# Patient Record
Sex: FEMALE | Race: OTHER | Employment: UNEMPLOYED | URBAN - METROPOLITAN AREA
[De-identification: names, ages, dates, MRNs, and addresses within clinical notes are randomized per-mention and may not be internally consistent; named-entity substitution may affect disease eponyms.]

---

## 2019-09-04 LAB — MAMMOGRAPHY, EXTERNAL: NORMAL

## 2020-08-04 ENCOUNTER — HOSPITAL ENCOUNTER (OUTPATIENT)
Age: 60
Discharge: HOME | End: 2020-08-06
Payer: COMMERCIAL

## 2020-08-04 ENCOUNTER — OFFICE VISIT (OUTPATIENT)
Dept: PRIMARY CARE CLINIC | Age: 60
End: 2020-08-04
Payer: COMMERCIAL

## 2020-08-04 VITALS
WEIGHT: 158 LBS | RESPIRATION RATE: 16 BRPM | SYSTOLIC BLOOD PRESSURE: 128 MMHG | DIASTOLIC BLOOD PRESSURE: 60 MMHG | HEIGHT: 66 IN | OXYGEN SATURATION: 98 % | HEART RATE: 77 BPM | BODY MASS INDEX: 25.39 KG/M2

## 2020-08-04 LAB
CHOLESTEROL, TOTAL: 153 MG/DL (ref 0–199)
HDLC SERPL-MCNC: 43 MG/DL
INTERNATIONAL NORMALIZATION RATIO, POC: 3.5
LDL CHOLESTEROL CALCULATED: 69 MG/DL (ref 0–99)
PROTHROMBIN TIME, POC: NORMAL
TRIGL SERPL-MCNC: 204 MG/DL (ref 0–149)
VLDLC SERPL CALC-MCNC: 41 MG/DL

## 2020-08-04 PROCEDURE — 99204 OFFICE O/P NEW MOD 45 MIN: CPT | Performed by: FAMILY MEDICINE

## 2020-08-04 PROCEDURE — 80061 LIPID PANEL: CPT

## 2020-08-04 PROCEDURE — 85610 PROTHROMBIN TIME: CPT | Performed by: FAMILY MEDICINE

## 2020-08-04 PROCEDURE — 3017F COLORECTAL CA SCREEN DOC REV: CPT | Performed by: FAMILY MEDICINE

## 2020-08-04 PROCEDURE — 4004F PT TOBACCO SCREEN RCVD TLK: CPT | Performed by: FAMILY MEDICINE

## 2020-08-04 PROCEDURE — G8427 DOCREV CUR MEDS BY ELIG CLIN: HCPCS | Performed by: FAMILY MEDICINE

## 2020-08-04 PROCEDURE — G8419 CALC BMI OUT NRM PARAM NOF/U: HCPCS | Performed by: FAMILY MEDICINE

## 2020-08-04 PROCEDURE — 36415 COLL VENOUS BLD VENIPUNCTURE: CPT

## 2020-08-04 RX ORDER — PAROXETINE HYDROCHLORIDE 20 MG/1
20 TABLET, FILM COATED ORAL EVERY MORNING
COMMUNITY
End: 2020-11-24 | Stop reason: SDUPTHER

## 2020-08-04 RX ORDER — WARFARIN SODIUM 5 MG/1
5 TABLET ORAL
COMMUNITY
End: 2020-11-24 | Stop reason: SDUPTHER

## 2020-08-04 RX ORDER — ATORVASTATIN CALCIUM 40 MG/1
40 TABLET, FILM COATED ORAL DAILY
COMMUNITY
End: 2020-11-24 | Stop reason: SDUPTHER

## 2020-08-04 ASSESSMENT — PATIENT HEALTH QUESTIONNAIRE - PHQ9
1. LITTLE INTEREST OR PLEASURE IN DOING THINGS: 0
SUM OF ALL RESPONSES TO PHQ9 QUESTIONS 1 & 2: 0
SUM OF ALL RESPONSES TO PHQ QUESTIONS 1-9: 0
SUM OF ALL RESPONSES TO PHQ QUESTIONS 1-9: 0
2. FEELING DOWN, DEPRESSED OR HOPELESS: 0

## 2020-08-04 NOTE — PROGRESS NOTES
2020     Olga Holter    : 1960 Sex: female   Age: 62 y.o. Chief Complaint   Patient presents with    [facility]       HPI: This 62y.o. -year-old female  presents today to establish as a new patient. Past medical and surgical history include aortic valve replacement surgery, depression, hyperlipidemia and hydrocephalus. Social history is positive for tobacco dependence. The patient states she smoked for 40 years approximately 5 cigarettes a day. Family history is significant for Parkinson's and pulmonary arterial hypertension. The patient is up-to-date on all age-appropriate wellness issues. ROS:   Const: Denies changes in appetite, chills, fever, night sweats and weight loss. Eyes:  Denies discharge, a recent change in visual acuity, blurred vision and double vision. ENMT: Denies discharge of the ears, hearing loss, pain of the ears. Denies nasal or sinus symptoms other than stated above. Denies mouth or throat symptoms. CV:  Denies chest pain, dyspnea on exertion, orthopnea, palpitations and PND  Resp: Denies chest pain, cough, SOB and wheezing. GI: Denies abdominal pain, constipation, diarrhea, heartburn, indigestion, nausea and vomiting. : Denies dysuria, frequency, hematuria, nocturia and urgency. Musculo: Denies arthralgias and myalgia  Skin:  Denies lesions, pruritus and rash. Neuro: Denies dizziness, lightheadedness, numbness, tingling and weakness. Psych:  Denies anxiety and depression  Endocrine: Denies anxiety and depression. Hema/Lymph: Denies hematologic symptoms  Allergy/Immuno:  Denies allergic/immunologic symptoms.   Pertinent positives reviewed and noted      Current Outpatient Medications:     warfarin (COUMADIN) 5 MG tablet, Take 5 mg by mouth, Disp: , Rfl:     atorvastatin (LIPITOR) 40 MG tablet, Take 40 mg by mouth daily, Disp: , Rfl:     PARoxetine (PAXIL) 20 MG tablet, Take 20 mg by mouth every morning, Disp: , Rfl:     Allergies   Allergen Reactions    Pcn [Penicillins]        No past medical history on file. Social History     Socioeconomic History    Marital status:      Spouse name: Not on file    Number of children: Not on file    Years of education: Not on file    Highest education level: Not on file   Occupational History    Not on file   Social Needs    Financial resource strain: Not on file    Food insecurity     Worry: Not on file     Inability: Not on file    Transportation needs     Medical: Not on file     Non-medical: Not on file   Tobacco Use    Smoking status: Current Every Day Smoker     Packs/day: 0.25     Years: 40.00     Pack years: 10.00     Start date: 8/4/1980    Smokeless tobacco: Never Used   Substance and Sexual Activity    Alcohol use: Not on file    Drug use: Not on file    Sexual activity: Not on file   Lifestyle    Physical activity     Days per week: Not on file     Minutes per session: Not on file    Stress: Not on file   Relationships    Social connections     Talks on phone: Not on file     Gets together: Not on file     Attends Nondenominational service: Not on file     Active member of club or organization: Not on file     Attends meetings of clubs or organizations: Not on file     Relationship status: Not on file    Intimate partner violence     Fear of current or ex partner: Not on file     Emotionally abused: Not on file     Physically abused: Not on file     Forced sexual activity: Not on file   Other Topics Concern    Not on file   Social History Narrative    Not on file     No past surgical history on file. No family history on file. Vitals:    08/04/20 1248   BP: 128/60   Pulse: 77   Resp: 16   SpO2: 98%   Weight: 158 lb (71.7 kg)   Height: 5' 6\" (1.676 m)        Exam: Const: Appears healthy and well developed. No signs of acute distress present. Eyes: PERRL  ENMT: Tympanic membranes are intact. Nasal mucosa intact without noted erythema Septum is in the midline.   Posterior pharynx shows no exudate, irritation or redness. Neck:  Supple without adenopathy. Adequate range of motion   Resp: No rales, rhonchi, wheezes appreciated over the lungs bilaterally. CV: S1, S2 within normal limits. Regular rate and rhythm noted. Without murmur, gallop or rub. Extremities:  Pulses intact. Without noted edema. Abdomen: Positive bowel sounds. Palpation reveals softness, with no distension, organomegaly or tenderness. No abdominal masses palpable. Skin: Skin is warm and dry. Musculo: Unremarkable upon examination. Neuro: Alert and oriented X3. Cranial nerves grossly intact. Psych: Mood is normal.  Affect is normal.   Vital signs reviewed. Controlled Substances Monitoring:     No flowsheet data found. Plan Per Assessment:  Valerie Carlisle was seen today for establish care. Diagnoses and all orders for this visit:    History of artificial heart valve    Hyperlipidemia, unspecified hyperlipidemia type  -     Lipid Panel; Future    Encounter for screening mammogram for malignant neoplasm of breast  -     EDEL SCREENING W CAD BILATERAL 2 VW; Future    Encounter for screening for malignant neoplasm of rectum  -     POCT Fecal Immunochemical Test (FIT); Future    Depression, unspecified depression type        Return in about 4 weeks (around 9/1/2020) for PT/INR. 3-month office visit follow-up. Octaviano Harrington MD    Note was generated with the assistance of voice recognition software. Document was reviewed however may contain grammatical errors.

## 2020-08-11 ENCOUNTER — NURSE ONLY (OUTPATIENT)
Dept: PRIMARY CARE CLINIC | Age: 60
End: 2020-08-11
Payer: COMMERCIAL

## 2020-08-11 LAB
INTERNATIONAL NORMALIZATION RATIO, POC: 3
PROTHROMBIN TIME, POC: 36

## 2020-08-11 PROCEDURE — 85610 PROTHROMBIN TIME: CPT | Performed by: FAMILY MEDICINE

## 2020-08-27 ENCOUNTER — TELEPHONE (OUTPATIENT)
Dept: FAMILY MEDICINE CLINIC | Age: 60
End: 2020-08-27

## 2020-08-27 NOTE — TELEPHONE ENCOUNTER
Patient is calling requesting incontinence supplies. She said if someone calls her back to leave a message as her phone does not receive calls from numbers she doesn't have saved.

## 2020-09-02 ENCOUNTER — TELEPHONE (OUTPATIENT)
Dept: PRIMARY CARE CLINIC | Age: 60
End: 2020-09-02

## 2020-09-03 ENCOUNTER — TELEPHONE (OUTPATIENT)
Dept: PRIMARY CARE CLINIC | Age: 60
End: 2020-09-03

## 2020-09-04 ENCOUNTER — NURSE ONLY (OUTPATIENT)
Dept: PRIMARY CARE CLINIC | Age: 60
End: 2020-09-04
Payer: COMMERCIAL

## 2020-09-04 ENCOUNTER — ANTI-COAG VISIT (OUTPATIENT)
Dept: PRIMARY CARE CLINIC | Age: 60
End: 2020-09-04

## 2020-09-04 LAB
INTERNATIONAL NORMALIZATION RATIO, POC: 4.4
PROTHROMBIN TIME, POC: NORMAL

## 2020-09-04 PROCEDURE — 85610 PROTHROMBIN TIME: CPT | Performed by: FAMILY MEDICINE

## 2020-09-08 ENCOUNTER — TELEPHONE (OUTPATIENT)
Dept: PRIMARY CARE CLINIC | Age: 60
End: 2020-09-08

## 2020-09-11 ENCOUNTER — ANTI-COAG VISIT (OUTPATIENT)
Dept: PRIMARY CARE CLINIC | Age: 60
End: 2020-09-11

## 2020-09-11 ENCOUNTER — NURSE ONLY (OUTPATIENT)
Dept: PRIMARY CARE CLINIC | Age: 60
End: 2020-09-11
Payer: COMMERCIAL

## 2020-09-11 LAB
INTERNATIONAL NORMALIZATION RATIO, POC: 1.8
PROTHROMBIN TIME, POC: NORMAL

## 2020-09-11 PROCEDURE — 85610 PROTHROMBIN TIME: CPT | Performed by: FAMILY MEDICINE

## 2020-09-21 ENCOUNTER — NURSE ONLY (OUTPATIENT)
Dept: PRIMARY CARE CLINIC | Age: 60
End: 2020-09-21
Payer: COMMERCIAL

## 2020-09-21 LAB — INTERNATIONAL NORMALIZATION RATIO, POC: 3.5

## 2020-09-21 PROCEDURE — 85610 PROTHROMBIN TIME: CPT | Performed by: FAMILY MEDICINE

## 2020-09-21 PROCEDURE — 93793 ANTICOAG MGMT PT WARFARIN: CPT | Performed by: FAMILY MEDICINE

## 2020-10-12 ENCOUNTER — NURSE ONLY (OUTPATIENT)
Dept: PRIMARY CARE CLINIC | Age: 60
End: 2020-10-12
Payer: COMMERCIAL

## 2020-10-12 LAB
INTERNATIONAL NORMALIZATION RATIO, POC: 3.3
PROTHROMBIN TIME, POC: 39.1

## 2020-10-12 PROCEDURE — 85610 PROTHROMBIN TIME: CPT | Performed by: FAMILY MEDICINE

## 2020-11-02 ENCOUNTER — TELEPHONE (OUTPATIENT)
Dept: ADMINISTRATIVE | Age: 60
End: 2020-11-02

## 2020-11-17 ENCOUNTER — NURSE ONLY (OUTPATIENT)
Dept: PRIMARY CARE CLINIC | Age: 60
End: 2020-11-17
Payer: COMMERCIAL

## 2020-11-17 LAB
INTERNATIONAL NORMALIZATION RATIO, POC: 4.5
PROTHROMBIN TIME, POC: 54.4

## 2020-11-17 PROCEDURE — 85610 PROTHROMBIN TIME: CPT | Performed by: FAMILY MEDICINE

## 2020-11-17 PROCEDURE — 93793 ANTICOAG MGMT PT WARFARIN: CPT | Performed by: FAMILY MEDICINE

## 2020-11-17 RX ORDER — WARFARIN SODIUM 4 MG/1
4 TABLET ORAL DAILY
Qty: 30 TABLET | Refills: 3 | Status: SHIPPED
Start: 2020-11-17 | End: 2020-11-24 | Stop reason: SDUPTHER

## 2020-11-20 LAB
CONTROL: NORMAL
HEMOCCULT STL QL: NORMAL

## 2020-11-20 PROCEDURE — 82274 ASSAY TEST FOR BLOOD FECAL: CPT | Performed by: FAMILY MEDICINE

## 2020-11-24 ENCOUNTER — OFFICE VISIT (OUTPATIENT)
Dept: PRIMARY CARE CLINIC | Age: 60
End: 2020-11-24
Payer: COMMERCIAL

## 2020-11-24 ENCOUNTER — ANTI-COAG VISIT (OUTPATIENT)
Dept: PRIMARY CARE CLINIC | Age: 60
End: 2020-11-24

## 2020-11-24 VITALS
RESPIRATION RATE: 16 BRPM | HEIGHT: 66 IN | WEIGHT: 159 LBS | SYSTOLIC BLOOD PRESSURE: 138 MMHG | DIASTOLIC BLOOD PRESSURE: 70 MMHG | BODY MASS INDEX: 25.55 KG/M2

## 2020-11-24 PROBLEM — E78.5 HYPERLIPIDEMIA: Status: ACTIVE | Noted: 2020-11-24

## 2020-11-24 PROBLEM — Z95.2 HISTORY OF ARTIFICIAL HEART VALVE: Status: ACTIVE | Noted: 2020-11-24

## 2020-11-24 PROBLEM — F32.A DEPRESSION: Status: ACTIVE | Noted: 2020-11-24

## 2020-11-24 LAB
INTERNATIONAL NORMALIZATION RATIO, POC: 2.6
PROTHROMBIN TIME, POC: NORMAL

## 2020-11-24 PROCEDURE — 99214 OFFICE O/P EST MOD 30 MIN: CPT | Performed by: FAMILY MEDICINE

## 2020-11-24 PROCEDURE — G8482 FLU IMMUNIZE ORDER/ADMIN: HCPCS | Performed by: FAMILY MEDICINE

## 2020-11-24 PROCEDURE — 85610 PROTHROMBIN TIME: CPT | Performed by: FAMILY MEDICINE

## 2020-11-24 PROCEDURE — G8419 CALC BMI OUT NRM PARAM NOF/U: HCPCS | Performed by: FAMILY MEDICINE

## 2020-11-24 PROCEDURE — 90686 IIV4 VACC NO PRSV 0.5 ML IM: CPT | Performed by: FAMILY MEDICINE

## 2020-11-24 PROCEDURE — G8427 DOCREV CUR MEDS BY ELIG CLIN: HCPCS | Performed by: FAMILY MEDICINE

## 2020-11-24 PROCEDURE — 4004F PT TOBACCO SCREEN RCVD TLK: CPT | Performed by: FAMILY MEDICINE

## 2020-11-24 PROCEDURE — 90471 IMMUNIZATION ADMIN: CPT | Performed by: FAMILY MEDICINE

## 2020-11-24 PROCEDURE — 3017F COLORECTAL CA SCREEN DOC REV: CPT | Performed by: FAMILY MEDICINE

## 2020-11-24 RX ORDER — PAROXETINE HYDROCHLORIDE 20 MG/1
20 TABLET, FILM COATED ORAL EVERY MORNING
Qty: 30 TABLET | Refills: 3 | Status: SHIPPED
Start: 2020-11-24 | End: 2020-12-14 | Stop reason: SDUPTHER

## 2020-11-24 RX ORDER — ATORVASTATIN CALCIUM 40 MG/1
40 TABLET, FILM COATED ORAL DAILY
Qty: 30 TABLET | Refills: 3 | Status: SHIPPED
Start: 2020-11-24 | End: 2020-12-14 | Stop reason: SDUPTHER

## 2020-11-24 RX ORDER — UNDERPADS 23" X 36"
1 EACH MISCELLANEOUS DAILY
Qty: 2 PACKAGE | Refills: 3 | Status: SHIPPED | OUTPATIENT
Start: 2020-11-24 | End: 2021-07-26

## 2020-11-24 RX ORDER — WARFARIN SODIUM 5 MG/1
5 TABLET ORAL DAILY
Qty: 30 TABLET | Refills: 3 | Status: SHIPPED
Start: 2020-11-24 | End: 2020-12-14 | Stop reason: SDUPTHER

## 2020-11-24 RX ORDER — WARFARIN SODIUM 4 MG/1
4 TABLET ORAL DAILY
Qty: 30 TABLET | Refills: 3 | Status: SHIPPED
Start: 2020-11-24 | End: 2021-05-12 | Stop reason: SDUPTHER

## 2020-11-24 NOTE — PROGRESS NOTES
2020     Olga Holter    : 1960 Sex: female   Age: 56 y.o. Chief Complaint   Patient presents with    Other    Hyperlipidemia    Depression       HPI: This 56y.o. -year-old female  presents today for evaluation and management of her  chronic medical problems. Current medication list reviewed. The patient is tolerating all medications well without adverse events or known side effects. The patient does understand the risk and benefits of the prescribed medications. The patient is not up-to-date on all age-appropriate wellness issues. The patient presents today with a 5-month history of left lower quadrant pain. The patient states the pain is constant. The patient denies any recent change in bowel habits. The patient denies any urinary symptoms. Patient denies any fever, chills, night sweats, weight loss or decreased appetite. ROS:   Const: Denies changes in appetite, chills, fever, night sweats and weight loss. Eyes:  Denies discharge, a recent change in visual acuity, blurred vision and double vision. ENMT: Denies discharge of the ears, hearing loss, pain of the ears. Denies nasal or sinus symptoms other than stated above. Denies mouth or throat symptoms. CV:  Denies chest pain, dyspnea on exertion, orthopnea, palpitations and PND  Resp: Denies chest pain, cough, SOB and wheezing. GI: Pertinent for left lower quadrant pain. : Denies dysuria, frequency, hematuria, nocturia and urgency. Musculo: Denies arthralgias and myalgia  Skin:  Denies lesions, pruritus and rash. Neuro: Denies dizziness, lightheadedness, numbness, tingling and weakness. Psych:  Denies anxiety and depression  Endocrine: Denies anxiety and depression. Hema/Lymph: Denies hematologic symptoms  Allergy/Immuno:  Denies allergic/immunologic symptoms.   Pertinent positives reviewed and noted      Current Outpatient Medications:     warfarin (COUMADIN) 4 MG tablet, Take 1 tablet by mouth daily, Disp: 30 tablet, Rfl: 3    atorvastatin (LIPITOR) 40 MG tablet, Take 1 tablet by mouth daily, Disp: 30 tablet, Rfl: 3    PARoxetine (PAXIL) 20 MG tablet, Take 1 tablet by mouth every morning, Disp: 30 tablet, Rfl: 3    warfarin (COUMADIN) 5 MG tablet, Take 1 tablet by mouth daily, Disp: 30 tablet, Rfl: 3    Incontinence Supply Disposable (INCONTINENCE BRIEF LARGE) MISC, 1 each by Does not apply route daily, Disp: 2 Package, Rfl: 3    Allergies   Allergen Reactions    Pcn [Penicillins]        History reviewed. No pertinent past medical history.   Social History     Socioeconomic History    Marital status:      Spouse name: Not on file    Number of children: Not on file    Years of education: Not on file    Highest education level: Not on file   Occupational History    Not on file   Social Needs    Financial resource strain: Not on file    Food insecurity     Worry: Not on file     Inability: Not on file    Transportation needs     Medical: Not on file     Non-medical: Not on file   Tobacco Use    Smoking status: Current Every Day Smoker     Packs/day: 0.25     Years: 40.00     Pack years: 10.00     Start date: 8/4/1980    Smokeless tobacco: Never Used   Substance and Sexual Activity    Alcohol use: Not on file    Drug use: Not on file    Sexual activity: Not on file   Lifestyle    Physical activity     Days per week: Not on file     Minutes per session: Not on file    Stress: Not on file   Relationships    Social connections     Talks on phone: Not on file     Gets together: Not on file     Attends Oriental orthodox service: Not on file     Active member of club or organization: Not on file     Attends meetings of clubs or organizations: Not on file     Relationship status: Not on file    Intimate partner violence     Fear of current or ex partner: Not on file     Emotionally abused: Not on file     Physically abused: Not on file     Forced sexual activity: Not on file   Other Topics Concern    Not on file Social History Narrative    Not on file     History reviewed. No pertinent surgical history. History reviewed. No pertinent family history. Vitals:    11/24/20 1107   BP: 138/70   Resp: 16   Weight: 159 lb (72.1 kg)   Height: 5' 6\" (1.676 m)        Exam: Const: Appears healthy and well developed. No signs of acute distress present. Eyes: PERRL  ENMT: Tympanic membranes are intact. Nasal mucosa intact without noted erythema Septum is in the midline. Posterior pharynx shows no exudate, irritation or redness. Neck:  Supple without adenopathy. Adequate range of motion   Resp: No rales, rhonchi, wheezes appreciated over the lungs bilaterally. CV: S1, S2 within normal limits. Regular rate and rhythm noted. Without murmur, gallop or rub. Extremities:  Pulses intact. Without noted edema. Abdomen: Positive bowel sounds. Palpation reveals left lower quadrant pain. Skin: Skin is warm and dry. Musculo: Unremarkable upon examination. Neuro: Alert and oriented X3. Cranial nerves grossly intact. Psych: Mood is normal.  Affect is normal.   Vital signs reviewed. Controlled Substances Monitoring:     No flowsheet data found. Plan Per Assessment:  Israel Rey was seen today for other, hyperlipidemia and depression. Diagnoses and all orders for this visit:    History of artificial heart valve    Hyperlipidemia, unspecified hyperlipidemia type    Depression, unspecified depression type    Left lower quadrant pain  -     CT ABDOMEN W WO CONTRAST; Future  -     Serenity Hunt MD, General Surgery, [place]    Other orders  -     warfarin (COUMADIN) 4 MG tablet; Take 1 tablet by mouth daily  -     atorvastatin (LIPITOR) 40 MG tablet; Take 1 tablet by mouth daily  -     PARoxetine (PAXIL) 20 MG tablet; Take 1 tablet by mouth every morning  -     warfarin (COUMADIN) 5 MG tablet;  Take 1 tablet by mouth daily  -     Incontinence Supply Disposable (INCONTINENCE BRIEF LARGE) MISC; 1 each by Does not apply route daily  -     INFLUENZA, QUADV, 3 YRS AND OLDER, IM PF, PREFILL SYR OR SDV, 0.5ML (AFLURIA QUADV, PF)        Return in about 3 weeks (around 12/15/2020) for Annual exam.    Lenny Burns MD    Note was generated with the assistance of voice recognition software. Document was reviewed however may contain grammatical errors.

## 2020-11-24 NOTE — PROGRESS NOTES
Vaccine Information Sheet, \"Influenza - Inactivated\"  given to [facility], or parent/legal guardian of  Olga Holter and verbalized understanding. Patient responses:    Have you ever had a reaction to a flu vaccine? No  Do you have any current illness? No  Have you ever had Guillian Asbury Syndrome? No  Do you have a serious allergy to any of the follow: Neomycin, Polymyxin, Thimerosal, eggs or egg products? No    Flu vaccine given per order. Please see immunization tab. Risks and benefits explained. Current VIS given.

## 2020-12-04 ENCOUNTER — TELEPHONE (OUTPATIENT)
Dept: PRIMARY CARE CLINIC | Age: 60
End: 2020-12-04

## 2020-12-04 NOTE — TELEPHONE ENCOUNTER
Last Appointment:  11/24/2020  Future Appointments   Date Time Provider Ehsan Mack   12/4/2020 10:45 AM DO SOL Erickson [facility]   12/14/2020 12:40 PM Roma Miramontes MD [facility] called about order for CT to abdominal pain. I see the order status is closed. Does she needs this done?   Electronically signed by Kylah Anand LPN on 10/2/6933 at 92:74 AM

## 2020-12-07 ENCOUNTER — TELEPHONE (OUTPATIENT)
Dept: PRIMARY CARE CLINIC | Age: 60
End: 2020-12-07

## 2020-12-07 NOTE — TELEPHONE ENCOUNTER
[facility] needs CT order changed. It has to be either w contrast or without. Cant be both.  Please advise

## 2020-12-08 ENCOUNTER — TELEPHONE (OUTPATIENT)
Dept: PRIMARY CARE CLINIC | Age: 60
End: 2020-12-08

## 2020-12-08 NOTE — TELEPHONE ENCOUNTER
Pt daughter called stated that [facility] did not receive new order for ct abdomen.       Re - faxed to [address] [place] [address]

## 2020-12-10 ENCOUNTER — TELEPHONE (OUTPATIENT)
Dept: PRIMARY CARE CLINIC | Age: 60
End: 2020-12-10

## 2020-12-11 ENCOUNTER — OFFICE VISIT (OUTPATIENT)
Dept: SURGERY | Age: 60
End: 2020-12-11
Payer: COMMERCIAL

## 2020-12-11 VITALS
HEIGHT: 66 IN | HEART RATE: 97 BPM | OXYGEN SATURATION: 99 % | BODY MASS INDEX: 24.91 KG/M2 | TEMPERATURE: 98.1 F | RESPIRATION RATE: 20 BRPM | WEIGHT: 155 LBS

## 2020-12-11 PROCEDURE — G8419 CALC BMI OUT NRM PARAM NOF/U: HCPCS | Performed by: SURGERY

## 2020-12-11 PROCEDURE — G8427 DOCREV CUR MEDS BY ELIG CLIN: HCPCS | Performed by: SURGERY

## 2020-12-11 PROCEDURE — G8482 FLU IMMUNIZE ORDER/ADMIN: HCPCS | Performed by: SURGERY

## 2020-12-11 PROCEDURE — 99244 OFF/OP CNSLTJ NEW/EST MOD 40: CPT | Performed by: SURGERY

## 2020-12-11 ASSESSMENT — ENCOUNTER SYMPTOMS
SHORTNESS OF BREATH: 0
VOMITING: 0
DIARRHEA: 0
COUGH: 0
BLOOD IN STOOL: 0
BACK PAIN: 0
PHOTOPHOBIA: 0
WHEEZING: 0
ABDOMINAL PAIN: 1
SORE THROAT: 0
CONSTIPATION: 0
EYE REDNESS: 0
NAUSEA: 0

## 2020-12-11 NOTE — PATIENT INSTRUCTIONS
[facility] COLONOSCOPY PREPARATION    Instructions for Clear liquid diet  Definition  A clear liquid diet consists of clear liquids, such as water, broth and plain gelatin, that are easily digested and leave no undigested residue in your intestinal tract. Your doctor may prescribe a clear liquid diet before certain medical procedures or if you have certain digestive problems. Because a clear liquid diet can't provide you with adequate calories and nutrients, it shouldn't be continued for more than a few days. Purpose  A clear liquid diet is often used before tests, procedures or surgeries that require no food in your stomach or intestines, such as before colonoscopy. It may also be recommended as a short-term diet if you have certain digestive problems, such as nausea, vomiting or diarrhea, or after certain types of surgery. Diet details  A clear liquid diet helps maintain adequate hydration, provides some important electrolytes, such as sodium and potassium, and gives some energy at a time when a full diet isn't possible or recommended. The following foods are allowed in a clear liquid diet:    Plain water    Fruit juices without pulp, such as apple juice, white grape juice.  Strained lemonade    Clear, fat-free broth (bouillon or consomme)    Clear sodas     Plain gelatin (Not RED or PURPLE)   Honey    Ice pops without bits of fruit or fruit pulp    Tea or coffee without milk or cream   ** NOTHING RED OR PURPLE    Any foods not on the above list should be avoided. Also, for certain tests, such as colon exams, your doctor may ask you to avoid liquids or gelatin with red coloring.      A typical menu on the clear liquid diet may look like this:   Breakfast:  1 glass fruit juice  1 cup coffee or tea (without dairy products)  1 cup broth  1 bowl gelatin     Snack:  1 glass fruit juice  1 bowl gelatin     Lunch:  1 glass fruit juice  1 glass water  1 cup broth  1 bowl gelatin     Snack:  1 ice pop (without fruit pulp)  1 cup coffee or tea (without dairy products) or a soft drink     Dinner:  1 cup juice or water  1 cup broth  1 bowl gelatin  1 cup coffee or tea     Purchase this over the counter:  1. GATORADE/Clear liquid (64 ounces)   Pick these up at the pharmacy:  2. DULCOLAX 5 mg tablets (four tablets)  3. MIRALAX BOTTLE 238 grams (over the counter only)    The DAY BEFORE your colonoscopy:   Drink only clear liquids. (Absolutely no solid food)    COLONOSCOPY PREP:  3 PM: Mix the entire bottle of MIRALAX into the 64 ounces of GATORADE. (Put half the bottle in each 32 ounce bottle). Shake the solution until fully dissolved. Drink an 8 ounce glass every 30 minutes until the solution is gone. **DO NOT EAT OR DRINK ANYTHING AFTER MIDNIGHT**          The DAY OF your colonoscopy: You may take any necessary medications with a sip of water. Bring along someone to take you home. REMEMBER: The preparation is very important. An adequate clean out allows for the best evaluation of your entire colon. During the prep, using baby wipes may ease some of your discomfort.     You should NOT plan on working or driving the rest of the day due to sedation given at the procedure

## 2020-12-11 NOTE — PROGRESS NOTES
Consult Note    Dear Dr.R Sam Canales MD, thank you for referring Anna Sic Holter for evaluation. Reason for Consult: Left lower quadrant pain    HISTORY OF PRESENT ILLNESS:    The patient is a 56 y.o. female who presents with complaints of left lower quadrant pain present been ongoing for several months. Pain is present all the time. She also reports some left flank pain. She does states she has history of shingles on her left side but does not notice a rash now. Pain is not relieved with bowel movement. She denies any significant diarrhea or constipation. She denies rectal bleeding. She denies family history of colon cancer. She has never had a colonoscopy and does not want one. She did have a recent fit test which was negative. She does have a CAT scan ordered which has not been completed yet. She denies nausea vomiting. Pains are not relieved or worsened with eating. Past Medical History:   Diagnosis Date    Anxiety     Depression     Hyperlipidemia        Past Surgical History:   Procedure Laterality Date    AORTIC VALVE REPLACEMENT  1982    OTHER SURGICAL HISTORY      shunt placement     TONSILLECTOMY         Prior to Admission medications    Medication Sig Start Date End Date Taking?  Authorizing Provider   warfarin (COUMADIN) 4 MG tablet Take 1 tablet by mouth daily 11/24/20  Yes Easton Gill MD   atorvastatin (LIPITOR) 40 MG tablet Take 1 tablet by mouth daily 11/24/20  Yes Easton Gill MD   PARoxetine (PAXIL) 20 MG tablet Take 1 tablet by mouth every morning 11/24/20  Yes Easton Gill MD   warfarin (COUMADIN) 5 MG tablet Take 1 tablet by mouth daily 11/24/20  Yes Easton Gill MD   Incontinence Supply Disposable (INCONTINENCE BRIEF LARGE) MISC 1 each by Does not apply route daily 11/24/20 12/24/20 Yes Easton Gill MD       Scheduled Meds:  ContinuousInfusions:  PRN Meds:    Allergies   Allergen Reactions    Pcn [Penicillins]        Social History     Socioeconomic History    Marital status:      Spouse name: Not on file    Number of children: Not on file    Years of education: Not on file    Highest education level: Not on file   Occupational History    Not on file   Social Needs    Financial resource strain: Not on file    Food insecurity     Worry: Not on file     Inability: Not on file    Transportation needs     Medical: Not on file     Non-medical: Not on file   Tobacco Use    Smoking status: Current Every Day Smoker     Packs/day: 0.50     Years: 40.00     Pack years: 20.00     Start date: 8/4/1980    Smokeless tobacco: Never Used   Substance and Sexual Activity    Alcohol use: Not Currently    Drug use: Never    Sexual activity: Not on file   Lifestyle    Physical activity     Days per week: Not on file     Minutes per session: Not on file    Stress: Not on file   Relationships    Social connections     Talks on phone: Not on file     Gets together: Not on file     Attends Roman Catholic service: Not on file     Active member of club or organization: Not on file     Attends meetings of clubs or organizations: Not on file     Relationship status: Not on file    Intimate partner violence     Fear of current or ex partner: Not on file     Emotionally abused: Not on file     Physically abused: Not on file     Forced sexual activity: Not on file   Other Topics Concern    Not on file   Social History Narrative    Not on file       History reviewed. No pertinent family history. Review Of Systems:   Review of Systems   Constitutional: Negative for chills and fever. HENT: Negative for ear pain, nosebleeds, sore throat and tinnitus. Eyes: Negative for photophobia and redness. Respiratory: Negative for cough, shortness of breath and wheezing. Cardiovascular: Negative for chest pain and palpitations. Gastrointestinal: Positive for abdominal pain. Negative for blood in stool, constipation, diarrhea, nausea and vomiting.    Endocrine: Negative for polydipsia. Genitourinary: Negative for dysuria, hematuria and urgency. Musculoskeletal: Negative for back pain and neck pain. Skin: Negative for rash. Neurological: Negative for dizziness, tremors and seizures. Hematological: Does not bruise/bleed easily. All other systems reviewed and are negative. Physical Exam:  Vitals:    12/11/20 1049   Pulse: 97   Resp: 20   Temp: 98.1 °F (36.7 °C)   TempSrc: Infrared   SpO2: 99%   Weight: 155 lb (70.3 kg)   Height: 5' 6\" (1.676 m)       General: Well nourished, well developed, no acute distress  Eyes:  PERRL   Conjunctiva unremarkable   ENT:  TM's intact bilaterally, no effusion   Nasal:  No mucosal edema     No nasal drainage   Oral:  mucosa moist and pink   Neck:  Supple   No palpable cervical lymphoadenopathy   Thyroid without mass or enlargement  Resp: Lungs CTAB   Equal and adequate air exchange without accessory muscle use   No rales, rhonchi or wheeze  CV: S1S2 RRR   No murmur   Intact distal pulses   No edema  GI: Abdomen Soft, mild tenderness palpation left lower quadrant, non distended   Normoactive bowel sounds   No palpable hepatosplenomegaly  MS: Physiologic ROM of all extremities    Intact distal pulses   No clubbing or cyanosis   Skin Warm and dry; no rash or lesion  Neuro: Alert and oriented; normal and intact dtr's   Psych: Euthymic mood, congruent affect      No results found. Assessment/Plan:  68-year-old female with persistent left lower quadrant abdominal pain. Etiology at this point undetermined. CAT scan is pending. I did recommend colonoscopy, however she refuses. If she can get her CAT scan done this week, I will check her back in the office next week.     Electronically signed by Jasmin Ramirez DO on 12/11/20 at 12:19 PM EST

## 2020-12-14 ENCOUNTER — OFFICE VISIT (OUTPATIENT)
Dept: PRIMARY CARE CLINIC | Age: 60
End: 2020-12-14
Payer: COMMERCIAL

## 2020-12-14 VITALS
SYSTOLIC BLOOD PRESSURE: 104 MMHG | HEART RATE: 87 BPM | BODY MASS INDEX: 25.39 KG/M2 | OXYGEN SATURATION: 97 % | WEIGHT: 158 LBS | TEMPERATURE: 97.4 F | HEIGHT: 66 IN | DIASTOLIC BLOOD PRESSURE: 78 MMHG | RESPIRATION RATE: 16 BRPM

## 2020-12-14 LAB
INTERNATIONAL NORMALIZATION RATIO, POC: 4
PROTHROMBIN TIME, POC: 47.9

## 2020-12-14 PROCEDURE — G8482 FLU IMMUNIZE ORDER/ADMIN: HCPCS | Performed by: FAMILY MEDICINE

## 2020-12-14 PROCEDURE — 4004F PT TOBACCO SCREEN RCVD TLK: CPT | Performed by: FAMILY MEDICINE

## 2020-12-14 PROCEDURE — 3017F COLORECTAL CA SCREEN DOC REV: CPT | Performed by: FAMILY MEDICINE

## 2020-12-14 PROCEDURE — 85610 PROTHROMBIN TIME: CPT | Performed by: FAMILY MEDICINE

## 2020-12-14 PROCEDURE — 99213 OFFICE O/P EST LOW 20 MIN: CPT | Performed by: FAMILY MEDICINE

## 2020-12-14 PROCEDURE — G8419 CALC BMI OUT NRM PARAM NOF/U: HCPCS | Performed by: FAMILY MEDICINE

## 2020-12-14 PROCEDURE — G8427 DOCREV CUR MEDS BY ELIG CLIN: HCPCS | Performed by: FAMILY MEDICINE

## 2020-12-14 RX ORDER — WARFARIN SODIUM 5 MG/1
5 TABLET ORAL DAILY
Qty: 30 TABLET | Refills: 3 | Status: SHIPPED
Start: 2020-12-14 | End: 2021-04-06 | Stop reason: SDUPTHER

## 2020-12-14 RX ORDER — PAROXETINE HYDROCHLORIDE 20 MG/1
20 TABLET, FILM COATED ORAL EVERY MORNING
Qty: 30 TABLET | Refills: 3 | Status: SHIPPED
Start: 2020-12-14 | End: 2021-04-06 | Stop reason: SDUPTHER

## 2020-12-14 RX ORDER — ATORVASTATIN CALCIUM 40 MG/1
40 TABLET, FILM COATED ORAL DAILY
Qty: 30 TABLET | Refills: 3 | Status: SHIPPED
Start: 2020-12-14 | End: 2021-04-06 | Stop reason: SDUPTHER

## 2020-12-14 NOTE — PROGRESS NOTES
  atorvastatin (LIPITOR) 40 MG tablet, Take 1 tablet by mouth daily, Disp: 30 tablet, Rfl: 3    warfarin (COUMADIN) 4 MG tablet, Take 1 tablet by mouth daily, Disp: 30 tablet, Rfl: 3    Incontinence Supply Disposable (INCONTINENCE BRIEF LARGE) MISC, 1 each by Does not apply route daily, Disp: 2 Package, Rfl: 3    Allergies   Allergen Reactions    Pcn [Penicillins]        Past Medical History:   Diagnosis Date    Anxiety     Depression     Hyperlipidemia      Social History     Socioeconomic History    Marital status:      Spouse name: Not on file    Number of children: Not on file    Years of education: Not on file    Highest education level: Not on file   Occupational History    Not on file   Social Needs    Financial resource strain: Not on file    Food insecurity     Worry: Not on file     Inability: Not on file    Transportation needs     Medical: Not on file     Non-medical: Not on file   Tobacco Use    Smoking status: Current Every Day Smoker     Packs/day: 0.50     Years: 40.00     Pack years: 20.00     Start date: 8/4/1980    Smokeless tobacco: Never Used   Substance and Sexual Activity    Alcohol use: Not Currently    Drug use: Never    Sexual activity: Not on file   Lifestyle    Physical activity     Days per week: Not on file     Minutes per session: Not on file    Stress: Not on file   Relationships    Social connections     Talks on phone: Not on file     Gets together: Not on file     Attends Roman [employer] service: Not on file     Active member of club or organization: Not on file     Attends meetings of clubs or organizations: Not on file     Relationship status: Not on file    Intimate partner violence     Fear of current or ex partner: Not on file     Emotionally abused: Not on file     Physically abused: Not on file     Forced sexual activity: Not on file   Other Topics Concern    Not on file   Social History Narrative    Not on file     Past Surgical History: Procedure Laterality Date    AORTIC VALVE REPLACEMENT  1982    OTHER SURGICAL HISTORY      shunt placement     TONSILLECTOMY        History reviewed. No pertinent family history. Vitals:    12/14/20 1242   BP: 104/78   Pulse: 87   Resp: 16   Temp: 97.4 °F (36.3 °C)   TempSrc: Temporal   SpO2: 97%   Weight: 158 lb (71.7 kg)   Height: 5' 6\" (1.676 m)        Exam: Const: Appears healthy and well developed. No signs of acute distress present. Eyes: PERRL  ENMT: Tympanic membranes are intact. Nasal mucosa intact without noted erythema Septum is in the midline. Posterior pharynx shows no exudate, irritation or redness. Neck:  Supple without adenopathy. Adequate range of motion   Resp: No rales, rhonchi, wheezes appreciated over the lungs bilaterally. CV: S1, S2 within normal limits. Regular rate and rhythm noted. Without murmur, gallop or rub. Extremities:  Pulses intact. Without noted edema. Abdomen: Positive bowel sounds. Palpation reveals softness, with no distension, organomegaly or tenderness. Left lower quadrant tenderness noted to palpation. Skin: Skin is warm and dry. Musculo: Unchanged upon examination. Neuro: Alert and oriented X3. Cranial nerves grossly intact. Psych: Mood is normal.  Affect is normal.   Vital signs reviewed. Controlled Substances Monitoring:     No flowsheet data found. Plan Per Assessment:  Constantin Garcia was seen today for other. Diagnoses and all orders for this visit:    Left lower quadrant pain    Other orders  -     warfarin (COUMADIN) 5 MG tablet; Take 1 tablet by mouth daily  -     PARoxetine (PAXIL) 20 MG tablet; Take 1 tablet by mouth every morning  -     atorvastatin (LIPITOR) 40 MG tablet; Take 1 tablet by mouth daily        Return for MEDICATION CHECK, FOLLOW UP CHRONIC MEDICAL PROBLEMS. Follow-up as scheduled.     Lenny Burns MD Note was generated with the assistance of voice recognition software. Document was reviewed however may contain grammatical errors.

## 2020-12-30 ENCOUNTER — TELEPHONE (OUTPATIENT)
Dept: SURGERY | Age: 60
End: 2020-12-30

## 2021-01-07 ENCOUNTER — TELEPHONE (OUTPATIENT)
Dept: PRIMARY CARE CLINIC | Age: 61
End: 2021-01-07

## 2021-01-07 NOTE — TELEPHONE ENCOUNTER
Spoke to daughter, she is very confused as to why her mothers ct scan was denied by insurance. According to the insurance company, there is not enough clinical evidence that one is needed. Daughter is asking at the next visit, she be fully examined and have better notes so that her mother can be treated for the llq pain she is experiencing. Daughter states that they saw Dr. Amber Baker who did nothing except say that she should get this ct scan and then come back to see him.       Please advise

## 2021-01-12 ENCOUNTER — OFFICE VISIT (OUTPATIENT)
Dept: PRIMARY CARE CLINIC | Age: 61
End: 2021-01-12
Payer: COMMERCIAL

## 2021-01-12 VITALS
TEMPERATURE: 97.4 F | HEART RATE: 82 BPM | SYSTOLIC BLOOD PRESSURE: 110 MMHG | HEIGHT: 66 IN | DIASTOLIC BLOOD PRESSURE: 80 MMHG | WEIGHT: 160 LBS | OXYGEN SATURATION: 97 % | RESPIRATION RATE: 16 BRPM | BODY MASS INDEX: 25.71 KG/M2

## 2021-01-12 DIAGNOSIS — Z95.2 HISTORY OF ARTIFICIAL HEART VALVE: ICD-10-CM

## 2021-01-12 DIAGNOSIS — R10.32 LEFT LOWER QUADRANT ABDOMINAL PAIN: ICD-10-CM

## 2021-01-12 DIAGNOSIS — Z79.01 ANTICOAGULANT LONG-TERM USE: ICD-10-CM

## 2021-01-12 DIAGNOSIS — F32.A DEPRESSION, UNSPECIFIED DEPRESSION TYPE: ICD-10-CM

## 2021-01-12 DIAGNOSIS — E78.5 HYPERLIPIDEMIA, UNSPECIFIED HYPERLIPIDEMIA TYPE: Primary | ICD-10-CM

## 2021-01-12 LAB
INTERNATIONAL NORMALIZATION RATIO, POC: 3
PROTHROMBIN TIME, POC: 35.8

## 2021-01-12 PROCEDURE — G8482 FLU IMMUNIZE ORDER/ADMIN: HCPCS | Performed by: FAMILY MEDICINE

## 2021-01-12 PROCEDURE — 4004F PT TOBACCO SCREEN RCVD TLK: CPT | Performed by: FAMILY MEDICINE

## 2021-01-12 PROCEDURE — 3017F COLORECTAL CA SCREEN DOC REV: CPT | Performed by: FAMILY MEDICINE

## 2021-01-12 PROCEDURE — G8427 DOCREV CUR MEDS BY ELIG CLIN: HCPCS | Performed by: FAMILY MEDICINE

## 2021-01-12 PROCEDURE — G8419 CALC BMI OUT NRM PARAM NOF/U: HCPCS | Performed by: FAMILY MEDICINE

## 2021-01-12 PROCEDURE — 99214 OFFICE O/P EST MOD 30 MIN: CPT | Performed by: FAMILY MEDICINE

## 2021-01-12 PROCEDURE — 85610 PROTHROMBIN TIME: CPT | Performed by: FAMILY MEDICINE

## 2021-01-12 NOTE — PROGRESS NOTES
2021     Aurther Sieving Holter    : 1960 Sex: female   Age: 56 y.o. Chief Complaint   Patient presents with    Hyperlipidemia    Depression       HPI: This 56y.o. -year-old female  presents today for evaluation and management of her  chronic medical problems. Current medication list reviewed. The patient is tolerating all medications well without adverse events or known side effects. The patient does understand the risk and benefits of the prescribed medications. The patient is not up-to-date on all age-appropriate wellness issues. The patient continues to have left lower quadrant pain. This pain has been chronic in nature. Insurance denied a CT scan of the abdomen. The patient has been seen in consultation by general surgeon. The patient continues to have left lower quadrant pain which is intermittent in nature. The patient states before she left [place] to move to [place] she had a CT scan ordered. The patient denies any change in pain with food intake. The patient denies any change in bowel habits or urinary symptoms. ROS:   Const: Denies changes in appetite, chills, fever, night sweats and weight loss. Eyes:  Denies discharge, a recent change in visual acuity, blurred vision and double vision. ENMT: Denies discharge of the ears, hearing loss, pain of the ears. Denies nasal or sinus symptoms other than stated above. Denies mouth or throat symptoms. CV:  Denies chest pain, dyspnea on exertion, orthopnea, palpitations and PND  Resp: Denies chest pain, cough, SOB and wheezing. GI: Denies abdominal pain, constipation, diarrhea, heartburn, indigestion, nausea and vomiting. : Denies dysuria, frequency, hematuria, nocturia and urgency. Musculo: Denies arthralgias and myalgia  Skin:  Denies lesions, pruritus and rash. Neuro: Denies dizziness, lightheadedness, numbness, tingling and weakness.   Psych:  Denies anxiety and depression Endocrine: Denies polyuria, polydipsia, polyphagia, weight gain, dry skin, constipation, fatigue, cold intolerance, heat intolerance or tremors. Hema/Lymph: Denies hematologic symptoms  Allergy/Immuno:  Denies allergic/immunologic symptoms.   Pertinent positives reviewed and noted      Current Outpatient Medications:     warfarin (COUMADIN) 5 MG tablet, Take 1 tablet by mouth daily, Disp: 30 tablet, Rfl: 3    PARoxetine (PAXIL) 20 MG tablet, Take 1 tablet by mouth every morning, Disp: 30 tablet, Rfl: 3    atorvastatin (LIPITOR) 40 MG tablet, Take 1 tablet by mouth daily, Disp: 30 tablet, Rfl: 3    warfarin (COUMADIN) 4 MG tablet, Take 1 tablet by mouth daily, Disp: 30 tablet, Rfl: 3    Incontinence Supply Disposable (INCONTINENCE BRIEF LARGE) MISC, 1 each by Does not apply route daily, Disp: 2 Package, Rfl: 3    Allergies   Allergen Reactions    Pcn [Penicillins]        Past Medical History:   Diagnosis Date    Anxiety     Depression     Hyperlipidemia      Social History     Socioeconomic History    Marital status:      Spouse name: Not on file    Number of children: Not on file    Years of education: Not on file    Highest education level: Not on file   Occupational History    Not on file   Social Needs    Financial resource strain: Not on file    Food insecurity     Worry: Not on file     Inability: Not on file    Transportation needs     Medical: Not on file     Non-medical: Not on file   Tobacco Use    Smoking status: Current Every Day Smoker     Packs/day: 0.50     Years: 40.00     Pack years: 20.00     Start date: 8/4/1980    Smokeless tobacco: Never Used   Substance and Sexual Activity    Alcohol use: Not Currently    Drug use: Never    Sexual activity: Not on file   Lifestyle    Physical activity     Days per week: Not on file     Minutes per session: Not on file    Stress: Not on file   Relationships    Social connections     Talks on phone: Not on file Gets together: Not on file     Attends Anabaptist service: Not on file     Active member of club or organization: Not on file     Attends meetings of clubs or organizations: Not on file     Relationship status: Not on file    Intimate partner violence     Fear of current or ex partner: Not on file     Emotionally abused: Not on file     Physically abused: Not on file     Forced sexual activity: Not on file   Other Topics Concern    Not on file   Social History Narrative    Not on file     Past Surgical History:   Procedure Laterality Date    AORTIC VALVE REPLACEMENT  1982    OTHER SURGICAL HISTORY      shunt placement     TONSILLECTOMY        History reviewed. No pertinent family history. Vitals:    01/12/21 1317   BP: 110/80   Pulse: 82   Resp: 16   Temp: 97.4 °F (36.3 °C)   TempSrc: Temporal   SpO2: 97%   Weight: 160 lb (72.6 kg)   Height: 5' 6\" (1.676 m)        Exam: Const: Appears healthy and well developed. No signs of acute distress present. Eyes: PERRL  ENMT: Tympanic membranes are intact. Nasal mucosa intact without noted erythema Septum is in the midline. Posterior pharynx shows no exudate, irritation or redness. Neck:  Supple without adenopathy. Adequate range of motion   Resp: No rales, rhonchi, wheezes appreciated over the lungs bilaterally. CV: S1, S2 within normal limits. Regular rate and rhythm noted. Without murmur, gallop or rub. Extremities:  Pulses intact. Without noted edema. Abdomen: Positive bowel sounds. Noted left lower quadrant pain to palpation. Skin: Skin is warm and dry. Musculo: Unchanged upon examination. Neuro: Alert and oriented X3. Cranial nerves grossly intact. Psych: Mood is normal.  Affect is normal.   Vital signs reviewed. Controlled Substances Monitoring:     No flowsheet data found. Plan Per Assessment:  Andreea Bernardo was seen today for hyperlipidemia and depression.     Diagnoses and all orders for this visit: Hyperlipidemia, unspecified hyperlipidemia type    Depression, unspecified depression type    Left lower quadrant abdominal pain    History of artificial heart valve        Return in about 3 months (around 4/12/2021) for MEDICATION CHECK, FOLLOW UP [address]. Lisa Scherer MD    Note was generated with the assistance of voice recognition software. Document was reviewed however may contain grammatical errors.

## 2021-02-12 ENCOUNTER — NURSE ONLY (OUTPATIENT)
Dept: PRIMARY CARE CLINIC | Age: 61
End: 2021-02-12
Payer: COMMERCIAL

## 2021-02-12 DIAGNOSIS — Z95.2 HISTORY OF ARTIFICIAL HEART VALVE: ICD-10-CM

## 2021-02-12 LAB — INTERNATIONAL NORMALIZATION RATIO, POC: 1.8

## 2021-02-12 PROCEDURE — 93793 ANTICOAG MGMT PT WARFARIN: CPT | Performed by: FAMILY MEDICINE

## 2021-02-12 PROCEDURE — 85610 PROTHROMBIN TIME: CPT | Performed by: FAMILY MEDICINE

## 2021-02-12 NOTE — PROGRESS NOTES
Patient states she has taken 5MG every day. Also states she has been on antibiotic and ibuprofen for toothpain. Per Dr Ese Ramos patient is to continue 5 MG every day and repeat in one week.

## 2021-02-19 ENCOUNTER — OFFICE VISIT (OUTPATIENT)
Dept: FAMILY MEDICINE CLINIC | Age: 61
End: 2021-02-19
Payer: COMMERCIAL

## 2021-02-19 ENCOUNTER — NURSE ONLY (OUTPATIENT)
Dept: PRIMARY CARE CLINIC | Age: 61
End: 2021-02-19
Payer: COMMERCIAL

## 2021-02-19 VITALS
OXYGEN SATURATION: 96 % | HEART RATE: 70 BPM | WEIGHT: 159 LBS | TEMPERATURE: 97 F | DIASTOLIC BLOOD PRESSURE: 64 MMHG | HEIGHT: 66 IN | BODY MASS INDEX: 25.55 KG/M2 | SYSTOLIC BLOOD PRESSURE: 106 MMHG

## 2021-02-19 DIAGNOSIS — Z95.2 HISTORY OF ARTIFICIAL HEART VALVE: ICD-10-CM

## 2021-02-19 DIAGNOSIS — K08.89 PAIN, DENTAL: Primary | ICD-10-CM

## 2021-02-19 LAB — INTERNATIONAL NORMALIZATION RATIO, POC: 3.9

## 2021-02-19 PROCEDURE — 85610 PROTHROMBIN TIME: CPT | Performed by: FAMILY MEDICINE

## 2021-02-19 PROCEDURE — G8427 DOCREV CUR MEDS BY ELIG CLIN: HCPCS | Performed by: PHYSICIAN ASSISTANT

## 2021-02-19 PROCEDURE — 99213 OFFICE O/P EST LOW 20 MIN: CPT | Performed by: PHYSICIAN ASSISTANT

## 2021-02-19 PROCEDURE — 4004F PT TOBACCO SCREEN RCVD TLK: CPT | Performed by: PHYSICIAN ASSISTANT

## 2021-02-19 PROCEDURE — 93793 ANTICOAG MGMT PT WARFARIN: CPT | Performed by: FAMILY MEDICINE

## 2021-02-19 PROCEDURE — G8419 CALC BMI OUT NRM PARAM NOF/U: HCPCS | Performed by: PHYSICIAN ASSISTANT

## 2021-02-19 PROCEDURE — G8482 FLU IMMUNIZE ORDER/ADMIN: HCPCS | Performed by: PHYSICIAN ASSISTANT

## 2021-02-19 PROCEDURE — 3017F COLORECTAL CA SCREEN DOC REV: CPT | Performed by: PHYSICIAN ASSISTANT

## 2021-02-19 RX ORDER — ACETAMINOPHEN 500 MG/1
TABLET ORAL
COMMUNITY
Start: 2021-02-16

## 2021-02-19 RX ORDER — IBUPROFEN 800 MG/1
TABLET ORAL
COMMUNITY
Start: 2021-01-19 | End: 2021-05-05

## 2021-02-19 RX ORDER — CLINDAMYCIN HYDROCHLORIDE 300 MG/1
300 CAPSULE ORAL 4 TIMES DAILY
Qty: 40 CAPSULE | Refills: 0 | Status: SHIPPED | OUTPATIENT
Start: 2021-02-19 | End: 2021-03-01

## 2021-02-19 NOTE — PROGRESS NOTES
2021   [address]  [address] [facility]  [phone]    Cruzita Chaco Holter  : 1960  Age: 56 y.o. Sex: female      Subjective:  Chief Complaint   Patient presents with    Dental Pain       Pt presents with dental pain for the past 2 days. Patient initially started having problems with her tooth in January. She had a fractured tooth that she did follow-up with her dentist.  At that time the dentist attempted to do a root canal but was unsuccessful at completely removing all that need to be removed. During this time her dentist did put her on clindamycin as she does have an artificial heart valve. He referred her to a specialist to have the whole tooth extracted. She did follow-up with this appointment on Wednesday. Patient states since Wednesday she has had increased pain at the dental socket with some swelling. She denies any fever chills vomiting lethargy malaise. She states the specialist on Wednesday told her she should be on clindamycin but was unwilling to prescribe it for her and told her to follow-up with her PCP. Patient denies having any heat or redness drainage or gum swelling. Patient states the pain is severe and radiates up the whole side of her left face. Patient denies any chest pain shortness of breath or other cardiac symptoms. Patient denies other symptoms and presents for evaluation       ROS:  Positive and pertinent negatives as per HPI. All other systems are reviewed and negative.        Current Outpatient Medications:     ibuprofen (ADVIL;MOTRIN) 800 MG tablet, , Disp: , Rfl:     EQ PAIN RELIEVER 500 MG tablet, , Disp: , Rfl:     clindamycin (CLEOCIN) 300 MG capsule, Take 1 capsule by mouth 4 times daily for 10 days, Disp: 40 capsule, Rfl: 0    warfarin (COUMADIN) 5 MG tablet, Take 1 tablet by mouth daily, Disp: 30 tablet, Rfl: 3    PARoxetine (PAXIL) 20 MG tablet, Take 1 tablet by mouth every morning, Disp: 30 tablet, Rfl: 3   atorvastatin (LIPITOR) 40 MG tablet, Take 1 tablet by mouth daily, Disp: 30 tablet, Rfl: 3    warfarin (COUMADIN) 4 MG tablet, Take 1 tablet by mouth daily, Disp: 30 tablet, Rfl: 3    Incontinence Supply Disposable (INCONTINENCE BRIEF LARGE) MISC, 1 each by Does not apply route daily, Disp: 2 Package, Rfl: 3   Allergies   Allergen Reactions    Pcn [Penicillins]         Objective:  Vitals:    02/19/21 1119   BP: 106/64   Pulse: 70   Temp: 97 °F (36.1 °C)   SpO2: 96%   Weight: 159 lb (72.1 kg)   Height: 5' 6\" (1.676 m)         Exam:  Const: Appears healthy and well developed. Vital reviewed as per triage. No acute distress. Head/Face: Normocephalic, atraumatic. Facies is symmetric. Eyes: Pupils equal, round and reactive to light. ENMT:  Nares are patent. Buccal mucosa is moist.  The patient has no erythema in the posterior pharynx. Extraction of tooth #19 is noted. There is no erythema drainage swelling noted at the extraction site. Patient does have very slight swelling of the cheek without redness heat noted. No abscess, trismus noted. No gingival erythema or swelling. Resp: No respiratory distress. Cardiovascular: Regular rate and rhythm  Lymph: No visible or palpable cervical lymphadenopathy. Submandibular nodes not palpable. Skin: Skin is warm and dry. Neuro: Alert and oriented x3. Speech is articulate and fluent. Psych: Mood and affect are appropriate to situation. Gloria Mathis was seen today for dental pain. Diagnoses and all orders for this visit:    Pain, dental  -     clindamycin (CLEOCIN) 300 MG capsule;  Take 1 capsule by mouth 4 times daily for 10 days At this time patient does have very slight swelling that correlates with the extraction site. She denies any fever vomiting malaise. On exam there is no sign of an abscess. There is no sign of a facial cellulitis. I do believe the swelling and the pain does correlate with the actual extraction. I will put her back on clindamycin as she does have the artificial heart valve. However I believe that she does need to follow-up with the specialist who did the extraction on Wednesday to see what his expectation for healing is at this is within an acceptable level of healing. In the interim if she would develop fever vomiting worsening swelling erythema or worsening pain I have advised ER. Jennifer Peñaloza Patient voiced understanding and will follow up. Patient is to follow-up with their dentist within next 2-3 days. If there are any new or worsening symptoms to the emergency department.      Seen By:    Laurence Shaver PA-C

## 2021-02-26 ENCOUNTER — NURSE ONLY (OUTPATIENT)
Dept: PRIMARY CARE CLINIC | Age: 61
End: 2021-02-26
Payer: COMMERCIAL

## 2021-02-26 DIAGNOSIS — Z95.2 HISTORY OF ARTIFICIAL HEART VALVE: ICD-10-CM

## 2021-02-26 LAB — INTERNATIONAL NORMALIZATION RATIO, POC: 2.1

## 2021-02-26 PROCEDURE — 93793 ANTICOAG MGMT PT WARFARIN: CPT | Performed by: FAMILY MEDICINE

## 2021-02-26 PROCEDURE — 85610 PROTHROMBIN TIME: CPT | Performed by: FAMILY MEDICINE

## 2021-03-10 ENCOUNTER — IMMUNIZATION (OUTPATIENT)
Dept: PRIMARY CARE CLINIC | Age: 61
End: 2021-03-10
Payer: COMMERCIAL

## 2021-03-10 ENCOUNTER — TELEPHONE (OUTPATIENT)
Dept: PRIMARY CARE CLINIC | Age: 61
End: 2021-03-10

## 2021-03-10 PROCEDURE — 91300 COVID-19, PFIZER VACCINE 30MCG/0.3ML DOSE: CPT | Performed by: PHYSICIAN ASSISTANT

## 2021-03-10 PROCEDURE — 0001A COVID-19, PFIZER VACCINE 30MCG/0.3ML DOSE: CPT | Performed by: PHYSICIAN ASSISTANT

## 2021-03-10 NOTE — TELEPHONE ENCOUNTER
Patient received first [address] vaccine today. After receiving the vaccine patient started complaining of chest discomfort and heaviness. We monitored her heart rate, oxygen as well as blood pressure. Vitals were stable, though patient was still complaining of chest discomfort. We consulted our PA, Nasrin Freire and he asked we monitor her for 30 minutes. Patient was given crackers and juice to calm her nerves. After 30 minutes patient stated she felt much better and the chest discomfort went away. Patient was checkout and sent home and told call or go to hospital if anything were to occur.

## 2021-03-30 ENCOUNTER — VIRTUAL VISIT (OUTPATIENT)
Dept: PRIMARY CARE CLINIC | Age: 61
End: 2021-03-30
Payer: COMMERCIAL

## 2021-03-30 DIAGNOSIS — J01.90 ACUTE SINUSITIS, RECURRENCE NOT SPECIFIED, UNSPECIFIED LOCATION: Primary | ICD-10-CM

## 2021-03-30 PROCEDURE — 3017F COLORECTAL CA SCREEN DOC REV: CPT | Performed by: FAMILY MEDICINE

## 2021-03-30 PROCEDURE — 99213 OFFICE O/P EST LOW 20 MIN: CPT | Performed by: FAMILY MEDICINE

## 2021-03-30 PROCEDURE — G8427 DOCREV CUR MEDS BY ELIG CLIN: HCPCS | Performed by: FAMILY MEDICINE

## 2021-03-30 RX ORDER — DOXYCYCLINE HYCLATE 100 MG
100 TABLET ORAL 2 TIMES DAILY
Qty: 20 TABLET | Refills: 0 | Status: SHIPPED
Start: 2021-03-30 | End: 2021-04-06 | Stop reason: SINTOL

## 2021-03-30 NOTE — PROGRESS NOTES
3/30/2021    TELEHEALTH EVALUATION -- Audio/Visual (During ZAAQX-18 public health emergency)    HPI: This 49-year-old female presents today with symptoms. The patient states of recent onset. Patient states the symptoms started 7 days ago and includes left facial pain with purulent nasal drainage. The patient denies any fever or chills. Current medication list reviewed. The patient is tolerating all medications well without adverse events or known side effects. The patient is not up-to-date on all age-appropriate wellness issues. Olga Z Holter (:  1960) has requested an audio/video evaluation for the following concern(s): Upper respiratory symptoms. Review of Systems negative unless otherwise noted in HPI. Prior to Visit Medications    Medication Sig Taking?  Authorizing Provider   doxycycline hyclate (VIBRA-TABS) 100 MG tablet Take 1 tablet by mouth 2 times daily for 10 days Yes Poonam Pelayo MD   ibuprofen (ADVIL;MOTRIN) 800 MG tablet  Yes Historical Provider, MD   EQ PAIN RELIEVER 500 MG tablet  Yes Historical Provider, MD   warfarin (COUMADIN) 5 MG tablet Take 1 tablet by mouth daily Yes Poonam Pelayo MD   PARoxetine (PAXIL) 20 MG tablet Take 1 tablet by mouth every morning Yes Poonam Pelayo MD   atorvastatin (LIPITOR) 40 MG tablet Take 1 tablet by mouth daily Yes Poonam Pelayo MD   warfarin (COUMADIN) 4 MG tablet Take 1 tablet by mouth daily Yes Poonam Pelayo MD   Incontinence Supply Disposable (INCONTINENCE BRIEF LARGE) MISC 1 each by Does not apply route daily  Poonam Pelayo MD       Social History     Tobacco Use    Smoking status: Current Every Day Smoker     Packs/day: 0.50     Years: 40.00     Pack years: 20.00     Start date: 1980    Smokeless tobacco: Never Used   Substance Use Topics    Alcohol use: Not Currently    Drug use: Never            PHYSICAL EXAMINATION:  [ INSTRUCTIONS:  \"[x]\" Indicates a positive item  \"[]\" Indicates a negative item  -- DELETE ALL ITEMS NOT EXAMINED]  Vital Signs: (As obtained by patient/caregiver or practitioner observation)    Blood pressure-  Heart rate-    Respiratory rate-    Temperature-  Pulse oximetry-     Constitutional: [x] Appears well-developed and well-nourished [x] No apparent distress      [] Abnormal-   Mental status  [x] Alert and awake  [x] Oriented to person/place/time [x]Able to follow commands      Eyes:  EOM    [x]  Normal  [] Abnormal-  Sclera  [x]  Normal  [] Abnormal -         Discharge [x]  None visible  [] Abnormal -    HENT:   [x] Normocephalic, atraumatic. [] Abnormal   [x] Mouth/Throat: Mucous membranes are moist.     External Ears [x] Normal  [] Abnormal-     Neck: [x] No visualized mass     Pulmonary/Chest: [x] Respiratory effort normal.  [x] No visualized signs of difficulty breathing or respiratory distress        [] Abnormal-      Musculoskeletal:   [] Normal gait with no signs of ataxia         [] Normal range of motion of neck        [] Abnormal-       Neurological:        [x] No Facial Asymmetry (Cranial nerve 7 motor function) (limited exam to video visit)          [x] No gaze palsy        [] Abnormal-         Skin:        [x] No significant exanthematous lesions or discoloration noted on facial skin         [] Abnormal-            Psychiatric:       [x] Normal Affect [x] No Hallucinations        [] Abnormal-     Other pertinent observable physical exam findings-     ASSESSMENT/PLAN: #1 acute sinusitis plan #1 doxycycline 100 mg twice daily x10 days #2 follow-up on Friday for reevaluation we will repeat PT/INR at that time. Return in about 3 days (around 4/2/2021) for FOLLOW UP ACUTE VISIT. Kirk Drain Holter, was evaluated through a synchronous (real-time) audio-video encounter. The patient (or guardian if applicable) is aware that this is a billable service. Verbal consent to proceed has been obtained within the past 12 months.  The visit was conducted pursuant to the emergency declaration under the [address] [facility] waiver authority and the [employer] and [employer] General Act. Patient identification was verified, and a caregiver was present when appropriate. The patient was located in a state where the provider was credentialed to provide care. This encounter was conducted via Face to Face [employer]. me    Total time spent on this encounter: 20 minutes    --Claudette Bloch, MD on 3/30/2021 at 2:22 PM    An electronic signature was used to authenticate this note.

## 2021-04-01 ENCOUNTER — IMMUNIZATION (OUTPATIENT)
Dept: PRIMARY CARE CLINIC | Age: 61
End: 2021-04-01
Payer: COMMERCIAL

## 2021-04-01 PROCEDURE — 91300 COVID-19, PFIZER VACCINE 30MCG/0.3ML DOSE: CPT | Performed by: PHYSICIAN ASSISTANT

## 2021-04-01 PROCEDURE — 0002A COVID-19, PFIZER VACCINE 30MCG/0.3ML DOSE: CPT | Performed by: PHYSICIAN ASSISTANT

## 2021-04-02 ENCOUNTER — NURSE ONLY (OUTPATIENT)
Dept: PRIMARY CARE CLINIC | Age: 61
End: 2021-04-02
Payer: COMMERCIAL

## 2021-04-02 DIAGNOSIS — Z95.2 HISTORY OF ARTIFICIAL HEART VALVE: ICD-10-CM

## 2021-04-02 LAB — INTERNATIONAL NORMALIZATION RATIO, POC: 4.1

## 2021-04-02 PROCEDURE — 93793 ANTICOAG MGMT PT WARFARIN: CPT | Performed by: FAMILY MEDICINE

## 2021-04-02 PROCEDURE — 85610 PROTHROMBIN TIME: CPT | Performed by: FAMILY MEDICINE

## 2021-04-06 ENCOUNTER — OFFICE VISIT (OUTPATIENT)
Dept: PRIMARY CARE CLINIC | Age: 61
End: 2021-04-06
Payer: COMMERCIAL

## 2021-04-06 VITALS
TEMPERATURE: 97.5 F | HEIGHT: 66 IN | DIASTOLIC BLOOD PRESSURE: 70 MMHG | BODY MASS INDEX: 24.59 KG/M2 | HEART RATE: 80 BPM | OXYGEN SATURATION: 98 % | SYSTOLIC BLOOD PRESSURE: 118 MMHG | WEIGHT: 153 LBS | RESPIRATION RATE: 16 BRPM

## 2021-04-06 DIAGNOSIS — J32.9 SINUSITIS, UNSPECIFIED CHRONICITY, UNSPECIFIED LOCATION: Primary | ICD-10-CM

## 2021-04-06 PROCEDURE — G8427 DOCREV CUR MEDS BY ELIG CLIN: HCPCS | Performed by: FAMILY MEDICINE

## 2021-04-06 PROCEDURE — 4004F PT TOBACCO SCREEN RCVD TLK: CPT | Performed by: FAMILY MEDICINE

## 2021-04-06 PROCEDURE — 3017F COLORECTAL CA SCREEN DOC REV: CPT | Performed by: FAMILY MEDICINE

## 2021-04-06 PROCEDURE — 99213 OFFICE O/P EST LOW 20 MIN: CPT | Performed by: FAMILY MEDICINE

## 2021-04-06 PROCEDURE — G8420 CALC BMI NORM PARAMETERS: HCPCS | Performed by: FAMILY MEDICINE

## 2021-04-06 RX ORDER — WARFARIN SODIUM 5 MG/1
5 TABLET ORAL DAILY
Qty: 30 TABLET | Refills: 3 | Status: SHIPPED
Start: 2021-04-06 | End: 2021-05-12 | Stop reason: SDUPTHER

## 2021-04-06 RX ORDER — IBUPROFEN 800 MG/1
800 TABLET ORAL EVERY 6 HOURS PRN
Qty: 120 TABLET | Refills: 2 | Status: CANCELLED | OUTPATIENT
Start: 2021-04-06

## 2021-04-06 RX ORDER — AZITHROMYCIN 250 MG/1
250 TABLET, FILM COATED ORAL SEE ADMIN INSTRUCTIONS
Qty: 6 TABLET | Refills: 0 | Status: SHIPPED | OUTPATIENT
Start: 2021-04-06 | End: 2021-04-11

## 2021-04-06 RX ORDER — PAROXETINE HYDROCHLORIDE 20 MG/1
20 TABLET, FILM COATED ORAL EVERY MORNING
Qty: 30 TABLET | Refills: 3 | Status: SHIPPED
Start: 2021-04-06 | End: 2021-05-12 | Stop reason: SDUPTHER

## 2021-04-06 RX ORDER — ATORVASTATIN CALCIUM 40 MG/1
40 TABLET, FILM COATED ORAL DAILY
Qty: 30 TABLET | Refills: 3 | Status: SHIPPED
Start: 2021-04-06 | End: 2021-05-12 | Stop reason: SDUPTHER

## 2021-04-06 NOTE — PROGRESS NOTES
2021     Stevphen Kinder Holter    : 1960 Sex: female   Age: 56 y.o. Chief Complaint   Patient presents with    Other     Reaction to Doxycycline    Headache       HPI: This 56y.o. -year-old female  presents today for evaluation and management of her  chronic medical problems. Current medication list reviewed. The patient states she had to discontinue the doxycycline secondary to persistent nausea. The patient presents today with left-sided facial pain. The patient states she also has an associated headache which started after her first COVID-19 vaccination on 10 March. . The patient denies any photophobia or phonophobia. Patient states at times during the day and headache is worse then other times. The patient does understand the risk and benefits of the prescribed medications. The patient is not up-to-date on all age-appropriate wellness issues. ROS:   Const: Denies changes in appetite, chills, fever, night sweats and weight loss. Eyes:  Denies discharge, a recent change in visual acuity, blurred vision and double vision. ENMT: Denies discharge of the ears, hearing loss, pain of the ears. Denies nasal or sinus symptoms other than stated above. Denies mouth or throat symptoms. CV:  Denies chest pain, dyspnea on exertion, orthopnea, palpitations and PND  Resp: Denies chest pain, cough, SOB and wheezing. GI: Denies abdominal pain, constipation, diarrhea, heartburn, indigestion, nausea and vomiting. : Denies dysuria, frequency, hematuria, nocturia and urgency. Musculo: Denies arthralgias and myalgia  Skin:  Denies lesions, pruritus and rash. Neuro: Denies dizziness, lightheadedness, numbness, tingling and weakness. Psych:  Denies anxiety and depression  Endocrine: Denies polyuria, polydipsia, polyphagia, weight gain, dry skin, constipation, fatigue, cold intolerance, heat intolerance or tremors.   Hema/Lymph: Denies hematologic symptoms  Allergy/Immuno:  Denies allergic/immunologic symptoms.   Pertinent positives reviewed and noted      Current Outpatient Medications:     warfarin (COUMADIN) 5 MG tablet, Take 1 tablet by mouth daily, Disp: 30 tablet, Rfl: 3    PARoxetine (PAXIL) 20 MG tablet, Take 1 tablet by mouth every morning, Disp: 30 tablet, Rfl: 3    atorvastatin (LIPITOR) 40 MG tablet, Take 1 tablet by mouth daily, Disp: 30 tablet, Rfl: 3    azithromycin (ZITHROMAX) 250 MG tablet, Take 1 tablet by mouth See Admin Instructions for 5 days 500mg on day 1 followed by 250mg on days 2 - 5, Disp: 6 tablet, Rfl: 0    ibuprofen (ADVIL;MOTRIN) 800 MG tablet, , Disp: , Rfl:     EQ PAIN RELIEVER 500 MG tablet, , Disp: , Rfl:     warfarin (COUMADIN) 4 MG tablet, Take 1 tablet by mouth daily (Patient not taking: Reported on 4/6/2021), Disp: 30 tablet, Rfl: 3    Incontinence Supply Disposable (INCONTINENCE BRIEF LARGE) MISC, 1 each by Does not apply route daily, Disp: 2 Package, Rfl: 3    Allergies   Allergen Reactions    Pcn [Penicillins]     Doxycycline Hyclate Nausea And Vomiting       Past Medical History:   Diagnosis Date    Anxiety     Depression     Hyperlipidemia      Social History     Socioeconomic History    Marital status:      Spouse name: Not on file    Number of children: Not on file    Years of education: Not on file    Highest education level: Not on file   Occupational History    Not on file   Social Needs    Financial resource strain: Not on file    Food insecurity     Worry: Not on file     Inability: Not on file    Transportation needs     Medical: Not on file     Non-medical: Not on file   Tobacco Use    Smoking status: Current Every Day Smoker     Packs/day: 0.50     Years: 40.00     Pack years: 20.00     Start date: 8/4/1980    Smokeless tobacco: Never Used   Substance and Sexual Activity    Alcohol use: Not Currently    Drug use: Never    Sexual activity: Not on file   Lifestyle    Physical activity     Days per week: Not on file     Minutes per session: Not on file    Stress: Not on file   Relationships    Social connections     Talks on phone: Not on file     Gets together: Not on file     Attends Church service: Not on file     Active member of club or organization: Not on file     Attends meetings of clubs or organizations: Not on file     Relationship status: Not on file    Intimate partner violence     Fear of current or ex partner: Not on file     Emotionally abused: Not on file     Physically abused: Not on file     Forced sexual activity: Not on file   Other Topics Concern    Not on file   Social History Narrative    Not on file     Past Surgical History:   Procedure Laterality Date    AORTIC VALVE REPLACEMENT  1982    OTHER SURGICAL HISTORY      shunt placement     TONSILLECTOMY        History reviewed. No pertinent family history. Vitals:    04/06/21 1319   BP: 118/70   Pulse: 80   Resp: 16   Temp: 97.5 °F (36.4 °C)   SpO2: 98%   Weight: 153 lb (69.4 kg)   Height: 5' 6\" (1.676 m)        Exam: Const: Appears healthy and well developed. No signs of acute distress present. Eyes: PERRL  ENMT: Tympanic membranes are intact. Nasal mucosa intact without noted erythema Septum is in the midline. Posterior pharynx shows no exudate, irritation or redness. Neck:  Supple without adenopathy. Adequate range of motion   Resp: No rales, rhonchi, wheezes appreciated over the lungs bilaterally. CV: S1, S2 within normal limits. Regular rate and rhythm noted. Without murmur, gallop or rub. Extremities:  Pulses intact. Without noted edema. Abdomen: Positive bowel sounds. Palpation reveals softness, with no distension, organomegaly or tenderness. No abdominal masses palpable. Skin: Skin is warm and dry. Musculo: Unchanged upon examination. Neuro: Alert and oriented X3. Cranial nerves grossly intact. Psych: Mood is normal.  Affect is normal.   Vital signs reviewed.       Controlled Substances Monitoring:     No flowsheet data found.     Plan Per Assessment:  Marivel Santamaria was seen today for other and headache. Diagnoses and all orders for this visit:    Sinusitis, unspecified chronicity, unspecified location  -     azithromycin (ZITHROMAX) 250 MG tablet; Take 1 tablet by mouth See Admin Instructions for 5 days 500mg on day 1 followed by 250mg on days 2 - 5    Other orders  -     warfarin (COUMADIN) 5 MG tablet; Take 1 tablet by mouth daily  -     PARoxetine (PAXIL) 20 MG tablet; Take 1 tablet by mouth every morning  -     atorvastatin (LIPITOR) 40 MG tablet; Take 1 tablet by mouth daily        Return in about 1 week (around 4/13/2021) for FOLLOW UP ACUTE VISIT. Chris Rudolph MD    Note was generated with the assistance of voice recognition software. Document was reviewed however may contain grammatical errors.

## 2021-04-12 ENCOUNTER — OFFICE VISIT (OUTPATIENT)
Dept: PRIMARY CARE CLINIC | Age: 61
End: 2021-04-12
Payer: COMMERCIAL

## 2021-04-12 VITALS
OXYGEN SATURATION: 97 % | DIASTOLIC BLOOD PRESSURE: 70 MMHG | BODY MASS INDEX: 25.23 KG/M2 | RESPIRATION RATE: 16 BRPM | HEART RATE: 84 BPM | TEMPERATURE: 97.5 F | WEIGHT: 157 LBS | SYSTOLIC BLOOD PRESSURE: 118 MMHG | HEIGHT: 66 IN

## 2021-04-12 DIAGNOSIS — E78.5 HYPERLIPIDEMIA, UNSPECIFIED HYPERLIPIDEMIA TYPE: Primary | ICD-10-CM

## 2021-04-12 DIAGNOSIS — J01.90 ACUTE SINUSITIS, RECURRENCE NOT SPECIFIED, UNSPECIFIED LOCATION: ICD-10-CM

## 2021-04-12 DIAGNOSIS — F32.A DEPRESSION, UNSPECIFIED DEPRESSION TYPE: ICD-10-CM

## 2021-04-12 PROCEDURE — 4004F PT TOBACCO SCREEN RCVD TLK: CPT | Performed by: FAMILY MEDICINE

## 2021-04-12 PROCEDURE — G8427 DOCREV CUR MEDS BY ELIG CLIN: HCPCS | Performed by: FAMILY MEDICINE

## 2021-04-12 PROCEDURE — 99213 OFFICE O/P EST LOW 20 MIN: CPT | Performed by: FAMILY MEDICINE

## 2021-04-12 PROCEDURE — 3017F COLORECTAL CA SCREEN DOC REV: CPT | Performed by: FAMILY MEDICINE

## 2021-04-12 PROCEDURE — G8419 CALC BMI OUT NRM PARAM NOF/U: HCPCS | Performed by: FAMILY MEDICINE

## 2021-04-12 RX ORDER — AZITHROMYCIN 250 MG/1
250 TABLET, FILM COATED ORAL SEE ADMIN INSTRUCTIONS
Qty: 6 TABLET | Refills: 0 | Status: SHIPPED | OUTPATIENT
Start: 2021-04-12 | End: 2021-04-17

## 2021-04-12 NOTE — PROGRESS NOTES
2021     Phineas Shake Holter    : 1960 Sex: female   Age: 56 y.o. Chief Complaint   Patient presents with    Headache       HPI: This 56y.o. -year-old female  presents today for evaluation and management of her  chronic medical problems. Current medication list reviewed. The patient is tolerating all medications well without adverse events or known side effects. The patient does understand the risk and benefits of the prescribed medications. The patient is up-to-date on all age-appropriate wellness issues. Patient continues to complain of sinus congestion and headache. The patient states she did use the azithromycin as directed. ROS:   Const: Denies changes in appetite, chills, fever, night sweats and weight loss. Eyes:  Denies discharge, a recent change in visual acuity, blurred vision and double vision. ENMT: Denies discharge of the ears, hearing loss, pain of the ears. Denies nasal or sinus symptoms other than stated above. Denies mouth or throat symptoms. CV:  Denies chest pain, dyspnea on exertion, orthopnea, palpitations and PND  Resp: Denies chest pain, cough, SOB and wheezing. GI: Denies abdominal pain, constipation, diarrhea, heartburn, indigestion, nausea and vomiting. : Denies dysuria, frequency, hematuria, nocturia and urgency. Musculo: Denies arthralgias and myalgia  Skin:  Denies lesions, pruritus and rash. Neuro: Denies dizziness, lightheadedness, numbness, tingling and weakness. Psych:  Denies anxiety and depression  Endocrine: Denies polyuria, polydipsia, polyphagia, weight gain, dry skin, constipation, fatigue, cold intolerance, heat intolerance or tremors. Hema/Lymph: Denies hematologic symptoms  Allergy/Immuno:  Denies allergic/immunologic symptoms.   Pertinent positives reviewed and noted      Current Outpatient Medications:     azithromycin (ZITHROMAX) 250 MG tablet, Take 1 tablet by mouth See Admin Instructions for 5 days 500mg on day 1 followed by 250mg on days 2 - 5, Disp: 6 tablet, Rfl: 0    warfarin (COUMADIN) 5 MG tablet, Take 1 tablet by mouth daily, Disp: 30 tablet, Rfl: 3    PARoxetine (PAXIL) 20 MG tablet, Take 1 tablet by mouth every morning, Disp: 30 tablet, Rfl: 3    atorvastatin (LIPITOR) 40 MG tablet, Take 1 tablet by mouth daily, Disp: 30 tablet, Rfl: 3    ibuprofen (ADVIL;MOTRIN) 800 MG tablet, , Disp: , Rfl:     EQ PAIN RELIEVER 500 MG tablet, , Disp: , Rfl:     warfarin (COUMADIN) 4 MG tablet, Take 1 tablet by mouth daily (Patient not taking: Reported on 4/6/2021), Disp: 30 tablet, Rfl: 3    Incontinence Supply Disposable (INCONTINENCE BRIEF LARGE) MISC, 1 each by Does not apply route daily, Disp: 2 Package, Rfl: 3    Allergies   Allergen Reactions    Pcn [Penicillins]     Doxycycline Hyclate Nausea And Vomiting       Past Medical History:   Diagnosis Date    Anxiety     Depression     Hyperlipidemia      Social History     Socioeconomic History    Marital status:      Spouse name: Not on file    Number of children: Not on file    Years of education: Not on file    Highest education level: Not on file   Occupational History    Not on file   Social Needs    Financial resource strain: Not on file    Food insecurity     Worry: Not on file     Inability: Not on file    Transportation needs     Medical: Not on file     Non-medical: Not on file   Tobacco Use    Smoking status: Current Every Day Smoker     Packs/day: 0.50     Years: 40.00     Pack years: 20.00     Start date: 8/4/1980    Smokeless tobacco: Never Used   Substance and Sexual Activity    Alcohol use: Not Currently    Drug use: Never    Sexual activity: Not on file   Lifestyle    Physical activity     Days per week: Not on file     Minutes per session: Not on file    Stress: Not on file   Relationships    Social connections     Talks on phone: Not on file     Gets together: Not on file     Attends Judaism service: Not on file     Active member of club or organization: Not on file     Attends meetings of clubs or organizations: Not on file     Relationship status: Not on file    Intimate partner violence     Fear of current or ex partner: Not on file     Emotionally abused: Not on file     Physically abused: Not on file     Forced sexual activity: Not on file   Other Topics Concern    Not on file   Social History Narrative    Not on file     Past Surgical History:   Procedure Laterality Date    AORTIC VALVE REPLACEMENT  1982    OTHER SURGICAL HISTORY      shunt placement     TONSILLECTOMY        History reviewed. No pertinent family history. Vitals:    04/12/21 1250   BP: 118/70   Pulse: 84   Resp: 16   Temp: 97.5 °F (36.4 °C)   SpO2: 97%   Weight: 157 lb (71.2 kg)   Height: 5' 6\" (1.676 m)        Exam: Const: Appears healthy and well developed. No signs of acute distress present. Eyes: PERRL  ENMT: Tympanic membranes are intact. Nasal mucosa intact without noted erythema Septum is in the midline. Posterior pharynx shows no exudate, irritation or redness. Neck:  Supple without adenopathy. Adequate range of motion   Resp: No rales, rhonchi, wheezes appreciated over the lungs bilaterally. CV: S1, S2 within normal limits. Regular rate and rhythm noted. Without murmur, gallop or rub. Extremities:  Pulses intact. Without noted edema. Abdomen: Positive bowel sounds. Palpation reveals softness, with no distension, organomegaly or tenderness. No abdominal masses palpable. Skin: Skin is warm and dry. Musculo: Unchanged upon examination. Neuro: Alert and oriented X3. Cranial nerves grossly intact. Psych: Mood is normal.  Affect is normal.   Vital signs reviewed. Controlled Substances Monitoring:     No flowsheet data found. Plan Per Assessment:  Yovani Bateman was seen today for headache.     Diagnoses and all orders for this visit:    Hyperlipidemia, unspecified hyperlipidemia type    Depression, unspecified depression type    Acute sinusitis, recurrence not specified, unspecified location  -     azithromycin (ZITHROMAX) 250 MG tablet; Take 1 tablet by mouth See Admin Instructions for 5 days 500mg on day 1 followed by 250mg on days 2 - 5        Return in about 1 week (around 4/19/2021) for MEDICATION CHECK, FOLLOW UP ACUTE VISIT. Treasure Arteaga MD    Note was generated with the assistance of voice recognition software. Document was reviewed however may contain grammatical errors.

## 2021-04-20 ENCOUNTER — OFFICE VISIT (OUTPATIENT)
Dept: PRIMARY CARE CLINIC | Age: 61
End: 2021-04-20
Payer: COMMERCIAL

## 2021-04-20 VITALS
OXYGEN SATURATION: 97 % | TEMPERATURE: 98.4 F | SYSTOLIC BLOOD PRESSURE: 110 MMHG | BODY MASS INDEX: 24.75 KG/M2 | DIASTOLIC BLOOD PRESSURE: 68 MMHG | RESPIRATION RATE: 16 BRPM | WEIGHT: 154 LBS | HEART RATE: 80 BPM | HEIGHT: 66 IN

## 2021-04-20 DIAGNOSIS — Z79.01 ANTICOAGULANT LONG-TERM USE: Primary | ICD-10-CM

## 2021-04-20 DIAGNOSIS — R51.9 NONINTRACTABLE HEADACHE, UNSPECIFIED CHRONICITY PATTERN, UNSPECIFIED HEADACHE TYPE: ICD-10-CM

## 2021-04-20 LAB
INTERNATIONAL NORMALIZATION RATIO, POC: 4.2
PROTHROMBIN TIME, POC: 50.9

## 2021-04-20 PROCEDURE — 4004F PT TOBACCO SCREEN RCVD TLK: CPT | Performed by: FAMILY MEDICINE

## 2021-04-20 PROCEDURE — G8420 CALC BMI NORM PARAMETERS: HCPCS | Performed by: FAMILY MEDICINE

## 2021-04-20 PROCEDURE — 85610 PROTHROMBIN TIME: CPT | Performed by: FAMILY MEDICINE

## 2021-04-20 PROCEDURE — 3017F COLORECTAL CA SCREEN DOC REV: CPT | Performed by: FAMILY MEDICINE

## 2021-04-20 PROCEDURE — G8427 DOCREV CUR MEDS BY ELIG CLIN: HCPCS | Performed by: FAMILY MEDICINE

## 2021-04-20 PROCEDURE — 99213 OFFICE O/P EST LOW 20 MIN: CPT | Performed by: FAMILY MEDICINE

## 2021-04-20 NOTE — PROGRESS NOTES
2021     Elliott Regal Holter    : 1960 Sex: female   Age: 56 y.o. Chief Complaint   Patient presents with    Hyperlipidemia       HPI: This 56y.o. -year-old female  presents today for evaluation and management of her  chronic medical problems. Current medication list reviewed. The patient is tolerating all medications well without adverse events or known side effects. The patient does understand the risk and benefits of the prescribed medications. The patient is not up-to-date on all age-appropriate wellness issues. Patient continues with a left-sided headache. Patient states since initiation of the antibiotic her headaches about 50% improved. ROS:   Const: Denies changes in appetite, chills, fever, night sweats and weight loss. Eyes:  Denies discharge, a recent change in visual acuity, blurred vision and double vision. ENMT: Denies discharge of the ears, hearing loss, pain of the ears. Denies nasal or sinus symptoms other than stated above. Denies mouth or throat symptoms. CV:  Denies chest pain, dyspnea on exertion, orthopnea, palpitations and PND  Resp: Denies chest pain, cough, SOB and wheezing. GI: Denies abdominal pain, constipation, diarrhea, heartburn, indigestion, nausea and vomiting. : Denies dysuria, frequency, hematuria, nocturia and urgency. Musculo: Denies arthralgias and myalgia  Skin:  Denies lesions, pruritus and rash. Neuro: Denies dizziness, lightheadedness, numbness, tingling and weakness. Psych:  Denies anxiety and depression  Endocrine: Denies polyuria, polydipsia, polyphagia, weight gain, dry skin, constipation, fatigue, cold intolerance, heat intolerance or tremors. Hema/Lymph: Denies hematologic symptoms  Allergy/Immuno:  Denies allergic/immunologic symptoms.   Pertinent positives reviewed and noted      Current Outpatient Medications:     warfarin (COUMADIN) 5 MG tablet, Take 1 tablet by mouth daily, Disp: 30 tablet, Rfl: 3    PARoxetine (PAXIL) 20 MG tablet, Take 1 tablet by mouth every morning, Disp: 30 tablet, Rfl: 3    atorvastatin (LIPITOR) 40 MG tablet, Take 1 tablet by mouth daily, Disp: 30 tablet, Rfl: 3    ibuprofen (ADVIL;MOTRIN) 800 MG tablet, , Disp: , Rfl:     EQ PAIN RELIEVER 500 MG tablet, , Disp: , Rfl:     warfarin (COUMADIN) 4 MG tablet, Take 1 tablet by mouth daily, Disp: 30 tablet, Rfl: 3    Incontinence Supply Disposable (INCONTINENCE BRIEF LARGE) MISC, 1 each by Does not apply route daily, Disp: 2 Package, Rfl: 3    Allergies   Allergen Reactions    Pcn [Penicillins]     Doxycycline Hyclate Nausea And Vomiting       Past Medical History:   Diagnosis Date    Anxiety     Depression     Hyperlipidemia      Social History     Socioeconomic History    Marital status:      Spouse name: Not on file    Number of children: Not on file    Years of education: Not on file    Highest education level: Not on file   Occupational History    Not on file   Social Needs    Financial resource strain: Not on file    Food insecurity     Worry: Not on file     Inability: Not on file    Transportation needs     Medical: Not on file     Non-medical: Not on file   Tobacco Use    Smoking status: Current Every Day Smoker     Packs/day: 0.50     Years: 40.00     Pack years: 20.00     Start date: 8/4/1980    Smokeless tobacco: Never Used   Substance and Sexual Activity    Alcohol use: Not Currently    Drug use: Never    Sexual activity: Not on file   Lifestyle    Physical activity     Days per week: Not on file     Minutes per session: Not on file    Stress: Not on file   Relationships    Social connections     Talks on phone: Not on file     Gets together: Not on file     Attends Shinto service: Not on file     Active member of club or organization: Not on file     Attends meetings of clubs or organizations: Not on file     Relationship status: Not on file    Intimate partner violence     Fear of current or ex partner: Not on file Emotionally abused: Not on file     Physically abused: Not on file     Forced sexual activity: Not on file   Other Topics Concern    Not on file   Social History Narrative    Not on file     Past Surgical History:   Procedure Laterality Date    AORTIC VALVE REPLACEMENT  1982    OTHER SURGICAL HISTORY      shunt placement     TONSILLECTOMY        History reviewed. No pertinent family history. Vitals:    04/20/21 1328   BP: 110/68   Pulse: 80   Resp: 16   Temp: 98.4 °F (36.9 °C)   TempSrc: Temporal   SpO2: 97%   Weight: 154 lb (69.9 kg)   Height: 5' 6\" (1.676 m)        Exam: Const: Appears healthy and well developed. No signs of acute distress present. Eyes: PERRL  ENMT: Tympanic membranes are intact. Nasal mucosa intact without noted erythema Septum is in the midline. Posterior pharynx shows no exudate, irritation or redness. Neck:  Supple without adenopathy. Adequate range of motion   Resp: No rales, rhonchi, wheezes appreciated over the lungs bilaterally. CV: S1, S2 within normal limits. Regular rate and rhythm noted. Without murmur, gallop or rub. Extremities:  Pulses intact. Without noted edema. Abdomen: Positive bowel sounds. Palpation reveals softness, with no distension, organomegaly or tenderness. No abdominal masses palpable. Skin: Skin is warm and dry. Musculo: Unchanged upon examination. Neuro: Alert and oriented X3. Cranial nerves grossly intact. Psych: Mood is normal.  Affect is normal.   Vital signs reviewed. Controlled Substances Monitoring:     No flowsheet data found. Plan Per Assessment:  Hanane  was seen today for hyperlipidemia.     Diagnoses and all orders for this visit:    Anticoagulant long-term use  -     POCT INR    Nonintractable headache, unspecified chronicity pattern, unspecified headache type  -     Amb External Referral To Neurology      The patient was advised to hold her Coumadin for 2 days and restarted on Thursday with a repeat PT/INR in 7 days.  Return for [address] [place] [address]. Manoj Ramirez MD    Note was generated with the assistance of voice recognition software. Document was reviewed however may contain grammatical errors.

## 2021-04-27 ENCOUNTER — NURSE ONLY (OUTPATIENT)
Dept: PRIMARY CARE CLINIC | Age: 61
End: 2021-04-27
Payer: COMMERCIAL

## 2021-04-27 ENCOUNTER — ANTI-COAG VISIT (OUTPATIENT)
Dept: PRIMARY CARE CLINIC | Age: 61
End: 2021-04-27

## 2021-04-27 DIAGNOSIS — Z95.2 HISTORY OF ARTIFICIAL HEART VALVE: Primary | ICD-10-CM

## 2021-04-27 LAB
INTERNATIONAL NORMALIZATION RATIO, POC: 1.5
PROTHROMBIN TIME, POC: NORMAL

## 2021-04-27 PROCEDURE — 85610 PROTHROMBIN TIME: CPT | Performed by: FAMILY MEDICINE

## 2021-05-05 ENCOUNTER — VIRTUAL VISIT (OUTPATIENT)
Dept: PRIMARY CARE CLINIC | Age: 61
End: 2021-05-05
Payer: COMMERCIAL

## 2021-05-05 DIAGNOSIS — J01.91 ACUTE RECURRENT SINUSITIS, UNSPECIFIED LOCATION: ICD-10-CM

## 2021-05-05 DIAGNOSIS — J40 BRONCHITIS: Primary | ICD-10-CM

## 2021-05-05 PROCEDURE — 3017F COLORECTAL CA SCREEN DOC REV: CPT | Performed by: FAMILY MEDICINE

## 2021-05-05 PROCEDURE — G8427 DOCREV CUR MEDS BY ELIG CLIN: HCPCS | Performed by: FAMILY MEDICINE

## 2021-05-05 PROCEDURE — 99213 OFFICE O/P EST LOW 20 MIN: CPT | Performed by: FAMILY MEDICINE

## 2021-05-05 RX ORDER — CIPROFLOXACIN 500 MG/1
500 TABLET, FILM COATED ORAL 2 TIMES DAILY
Qty: 20 TABLET | Refills: 0 | Status: SHIPPED | OUTPATIENT
Start: 2021-05-05 | End: 2021-05-15

## 2021-05-05 NOTE — PROGRESS NOTES
2021    TELEHEALTH EVALUATION -- Audio/Visual (During [facility] public health emergency)    HPI: This 64-year-old female presents today with a chief complaint of congestion and a headache. The patient does have history of headaches. The patient does admit to sinus congestion as well as a cough. Patient also has a presenting symptom of chills. Current medication list reviewed. The patient is tolerating all medications well without adverse events or known side effects. The patient is not up-to-date on all age-appropriate wellness issues. Olga Z Holter (:  1960) has requested an audio/video evaluation for the following concern(s): Follow-up evaluation. Review of Systems negative unless otherwise noted in HPI. Prior to Visit Medications    Medication Sig Taking?  Authorizing Provider   ciprofloxacin (CIPRO) 500 MG tablet Take 1 tablet by mouth 2 times daily for 10 days Yes Maddie Delgado MD   warfarin (COUMADIN) 5 MG tablet Take 1 tablet by mouth daily Yes Maddie Delgado MD   PARoxetine (PAXIL) 20 MG tablet Take 1 tablet by mouth every morning Yes Maddie Delgado MD   atorvastatin (LIPITOR) 40 MG tablet Take 1 tablet by mouth daily Yes Maddie Delgado MD   EQ PAIN RELIEVER 500 MG tablet  Yes Historical Provider, MD   warfarin (COUMADIN) 4 MG tablet Take 1 tablet by mouth daily Yes Maddie Delgado MD   Incontinence Supply Disposable (INCONTINENCE BRIEF LARGE) MISC 1 each by Does not apply route daily  Maddie Delgado MD       Social History     Tobacco Use    Smoking status: Current Every Day Smoker     Packs/day: 0.50     Years: 40.00     Pack years: 20.00     Start date: 1980    Smokeless tobacco: Never Used   Substance Use Topics    Alcohol use: Not Currently    Drug use: Never            PHYSICAL EXAMINATION:  [ INSTRUCTIONS:  \"[x]\" Indicates a positive item  \"[]\" Indicates a negative item  -- DELETE ALL ITEMS NOT EXAMINED]  Vital Signs: (As obtained by patient/caregiver or practitioner observation)    Blood pressure-  Heart rate-    Respiratory rate-    Temperature-  Pulse oximetry-     Constitutional: [x] Appears well-developed and well-nourished [x] No apparent distress      [] Abnormal-   Mental status  [x] Alert and awake  [x] Oriented to person/place/time []Able to follow commands      Eyes:  EOM    [x]  Normal  [] Abnormal-  Sclera  [x]  Normal  [] Abnormal -         Discharge [x]  None visible  [] Abnormal -    HENT:   [x] Normocephalic, atraumatic. [] Abnormal   [x] Mouth/Throat: Mucous membranes are moist.     External Ears [x] Normal  [] Abnormal-     Neck: [x] No visualized mass     Pulmonary/Chest: [x] Respiratory effort normal.  [x] No visualized signs of difficulty breathing or respiratory distress        [] Abnormal-      Musculoskeletal:   [] Normal gait with no signs of ataxia         [] Normal range of motion of neck        [] Abnormal-       Neurological:        [x] No Facial Asymmetry (Cranial nerve 7 motor function) (limited exam to video visit)          [x] No gaze palsy        [] Abnormal-         Skin:        [x] No significant exanthematous lesions or discoloration noted on facial skin         [] Abnormal-            Psychiatric:       [x] Normal Affect [x] No Hallucinations        [] Abnormal-     Other pertinent observable physical exam findings-     ASSESSMENT/PLAN: #1 bronchitis #2 sinusitis plan #1 Cipro 500 twice daily for 10 days #2 INR on Friday #3 follow-up in 1 week      Return in about 1 week (around 5/12/2021) for FOLLOW UP ACUTE VISIT. INR to be completed on Friday. Jolena Reveal Z Holter, was evaluated through a synchronous (real-time) audio-video encounter. The patient (or guardian if applicable) is aware that this is a billable service. Verbal consent to proceed has been obtained within the past 12 months.  The visit was conducted pursuant to the emergency declaration under the [address]5 waiver authority and the [employer] and [employer] Act. Patient identification was verified, and a caregiver was present when appropriate. The patient was located in a state where the provider was credentialed to provide care. This encounter was conducted via [employer]. Total time spent on this encounter: 20 minutes. --Nasrin Raines MD on 5/5/2021 at 11:35 AM    An electronic signature was used to authenticate this note.

## 2021-05-06 ENCOUNTER — TELEPHONE (OUTPATIENT)
Dept: PRIMARY CARE CLINIC | Age: 61
End: 2021-05-06

## 2021-05-06 NOTE — TELEPHONE ENCOUNTER
Pharmacy is calling to make sure you are aware of the interaction between warfarin and the antibiotic cipro that was given yesterday.        Would you like to change antibiotic or have her check INR more often

## 2021-05-07 ENCOUNTER — OFFICE VISIT (OUTPATIENT)
Dept: FAMILY MEDICINE CLINIC | Age: 61
End: 2021-05-07
Payer: COMMERCIAL

## 2021-05-07 ENCOUNTER — NURSE ONLY (OUTPATIENT)
Dept: PRIMARY CARE CLINIC | Age: 61
End: 2021-05-07
Payer: COMMERCIAL

## 2021-05-07 ENCOUNTER — TELEPHONE (OUTPATIENT)
Dept: PRIMARY CARE CLINIC | Age: 61
End: 2021-05-07

## 2021-05-07 VITALS
SYSTOLIC BLOOD PRESSURE: 142 MMHG | DIASTOLIC BLOOD PRESSURE: 68 MMHG | HEART RATE: 82 BPM | BODY MASS INDEX: 24.91 KG/M2 | WEIGHT: 155 LBS | RESPIRATION RATE: 18 BRPM | HEIGHT: 66 IN | TEMPERATURE: 98 F | OXYGEN SATURATION: 98 %

## 2021-05-07 DIAGNOSIS — Z79.01 ANTICOAGULANT LONG-TERM USE: Primary | ICD-10-CM

## 2021-05-07 DIAGNOSIS — R51.9 ACUTE NONINTRACTABLE HEADACHE, UNSPECIFIED HEADACHE TYPE: Primary | ICD-10-CM

## 2021-05-07 LAB
INTERNATIONAL NORMALIZATION RATIO, POC: 3
PROTHROMBIN TIME, POC: NORMAL

## 2021-05-07 PROCEDURE — 85610 PROTHROMBIN TIME: CPT | Performed by: FAMILY MEDICINE

## 2021-05-07 PROCEDURE — 4004F PT TOBACCO SCREEN RCVD TLK: CPT | Performed by: PHYSICIAN ASSISTANT

## 2021-05-07 PROCEDURE — 93793 ANTICOAG MGMT PT WARFARIN: CPT | Performed by: FAMILY MEDICINE

## 2021-05-07 PROCEDURE — G8419 CALC BMI OUT NRM PARAM NOF/U: HCPCS | Performed by: PHYSICIAN ASSISTANT

## 2021-05-07 PROCEDURE — 3017F COLORECTAL CA SCREEN DOC REV: CPT | Performed by: PHYSICIAN ASSISTANT

## 2021-05-07 PROCEDURE — G8427 DOCREV CUR MEDS BY ELIG CLIN: HCPCS | Performed by: PHYSICIAN ASSISTANT

## 2021-05-07 PROCEDURE — 99213 OFFICE O/P EST LOW 20 MIN: CPT | Performed by: PHYSICIAN ASSISTANT

## 2021-05-07 NOTE — TELEPHONE ENCOUNTER
Go and start the antibiotic and she needs an INR on Monday. I already answered this in a previous encounter.

## 2021-05-07 NOTE — PROGRESS NOTES
21  Broderick Hew Holter : 1960 Sex: female  Age 56 y.o. Subjective:  Chief Complaint   Patient presents with    Headache         HPI:   Broderick Hew Holter , 56 y.o. female presents to [facility] care for evaluation of a headache. Patient states she has been having left-sided headaches since the beginning of April when she received her Covid vaccine. Patient states they are getting worse. Patient rates the headache as severe constant and localized on the right. .  States she is developed light sensitivity nausea and vomiting as well. Her on antibiotic for presumed sinus infection on Tuesday but the headache has not improved rather it has gotten worse. She believes she has had subjective fevers since April but denies any today. Denies any rashes. Denies any head trauma or injury. Denies any neck pain. Patient denies cough or congestion. Patient states she has never had a headache this before. She feels though someone is stabbing her. Patient denies other symptoms and presents for evaluation      ROS:   Unless otherwise stated in this report the patient's positive and negative responses for review of systems for constitutional, eyes, ENT, cardiovascular, respiratory, gastrointestinal, neurological, , musculoskeletal, and integument systems and related systems to the presenting problem are either stated in the history of present illness or were not pertinent or were negative for the symptoms and/or complaints related to the presenting medical problem. Positives and pertinent negatives as per HPI. All others reviewed and are negative.       PMH:     Past Medical History:   Diagnosis Date    Anxiety     Depression     Hyperlipidemia        Past Surgical History:   Procedure Laterality Date    AORTIC VALVE REPLACEMENT      OTHER SURGICAL HISTORY      shunt placement     TONSILLECTOMY       Medications:     Current Outpatient Medications:     ciprofloxacin (CIPRO) 500 MG tablet, Take 1 tablet by mouth 2 times daily for 10 days, Disp: 20 tablet, Rfl: 0    warfarin (COUMADIN) 5 MG tablet, Take 1 tablet by mouth daily, Disp: 30 tablet, Rfl: 3    PARoxetine (PAXIL) 20 MG tablet, Take 1 tablet by mouth every morning, Disp: 30 tablet, Rfl: 3    atorvastatin (LIPITOR) 40 MG tablet, Take 1 tablet by mouth daily, Disp: 30 tablet, Rfl: 3    EQ PAIN RELIEVER 500 MG tablet, , Disp: , Rfl:     warfarin (COUMADIN) 4 MG tablet, Take 1 tablet by mouth daily, Disp: 30 tablet, Rfl: 3    Incontinence Supply Disposable (INCONTINENCE BRIEF LARGE) MISC, 1 each by Does not apply route daily, Disp: 2 Package, Rfl: 3    Allergies: Allergies   Allergen Reactions    Pcn [Penicillins]     Doxycycline Hyclate Nausea And Vomiting       Social History:     Social History     Tobacco Use    Smoking status: Current Every Day Smoker     Packs/day: 0.50     Years: 40.00     Pack years: 20.00     Start date: 8/4/1980    Smokeless tobacco: Never Used   Substance Use Topics    Alcohol use: Not Currently    Drug use: Never       Physical Exam:     Vitals:    05/07/21 1424 05/07/21 1427   BP: (!) 148/72 (!) 142/68   Pulse: 82    Resp: 18    Temp: 98 °F (36.7 °C)    TempSrc: Temporal    SpO2: 98%    Weight: 155 lb (70.3 kg)    Height: 5' 6\" (1.676 m)          Exam:  Const: Appears very uncomfortable, sitting in the darkened exam room holding her head in her hand. Vitals reviewed per triage. Head/Face: Normocephalic, atraumatic. Facies is symmetric. Eyes: PERRL. ENMT: Tympanic membranes are pearly gray with good light reflex bilaterally. Nares are patent. Buccal mucosa is moist.  No erythema in the posterior pharynx. Neck: Supple and symmetric. Palpation reveals no adenopathy. Trachea midline. Resp: Lungs are clear bilaterally. No adventitious sounds audible. CV: S1 is normal. S2 is normal.Pulses are equal bilaterally. Musculo: Patient moves extremities without pain or limitation. No pedal edema.   Skin: Skin is

## 2021-05-12 ENCOUNTER — VIRTUAL VISIT (OUTPATIENT)
Dept: PRIMARY CARE CLINIC | Age: 61
End: 2021-05-12
Payer: COMMERCIAL

## 2021-05-12 DIAGNOSIS — R51.9 INTRACTABLE HEADACHE, UNSPECIFIED CHRONICITY PATTERN, UNSPECIFIED HEADACHE TYPE: ICD-10-CM

## 2021-05-12 DIAGNOSIS — R09.81 SINUS CONGESTION: Primary | ICD-10-CM

## 2021-05-12 PROCEDURE — 99213 OFFICE O/P EST LOW 20 MIN: CPT | Performed by: FAMILY MEDICINE

## 2021-05-12 PROCEDURE — 3017F COLORECTAL CA SCREEN DOC REV: CPT | Performed by: FAMILY MEDICINE

## 2021-05-12 PROCEDURE — G8427 DOCREV CUR MEDS BY ELIG CLIN: HCPCS | Performed by: FAMILY MEDICINE

## 2021-05-12 RX ORDER — WARFARIN SODIUM 5 MG/1
5 TABLET ORAL DAILY
Qty: 30 TABLET | Refills: 3 | Status: SHIPPED
Start: 2021-05-12 | End: 2021-06-14 | Stop reason: SDUPTHER

## 2021-05-12 RX ORDER — PAROXETINE HYDROCHLORIDE 20 MG/1
20 TABLET, FILM COATED ORAL EVERY MORNING
Qty: 30 TABLET | Refills: 3 | Status: SHIPPED
Start: 2021-05-12 | End: 2021-06-14 | Stop reason: SDUPTHER

## 2021-05-12 RX ORDER — ATORVASTATIN CALCIUM 40 MG/1
40 TABLET, FILM COATED ORAL DAILY
Qty: 30 TABLET | Refills: 3 | Status: SHIPPED
Start: 2021-05-12 | End: 2021-06-14 | Stop reason: SDUPTHER

## 2021-05-12 RX ORDER — WARFARIN SODIUM 4 MG/1
4 TABLET ORAL DAILY
Qty: 30 TABLET | Refills: 3 | Status: SHIPPED
Start: 2021-05-12 | End: 2021-06-14 | Stop reason: SDUPTHER

## 2021-05-12 NOTE — PROGRESS NOTES
2021    TELEHEALTH EVALUATION -- Audio/Visual (During TKSJE-52 public health emergency)    HPI: This 64-year-old female presents today for follow-up evaluation. Patient at her last office visit was started on Cipro 500 twice a day. The patient still admits to sinus congestion with purulent nasal drainage. The patient also went to the emergency room with an unremitting headache. CT scan showed some sinus congestion otherwise unremarkable. The patient did have a shunt placed approximately 3 years ago for hydrocephalus. The patient has a pending appointment with a neurologist.  Current medication list reviewed. The patient is tolerating all medications well without adverse events. The patient is not up-to-date on all age-appropriate wellness issues. Olga Z Holter (:  1960) has requested an audio/video evaluation for the following concern(s): Follow-up evaluation. Review of Systems negative unless otherwise noted in HPI. Prior to Visit Medications    Medication Sig Taking?  Authorizing Provider   warfarin (COUMADIN) 5 MG tablet Take 1 tablet by mouth daily Yes Wero Chavez MD   warfarin (COUMADIN) 4 MG tablet Take 1 tablet by mouth daily Yes Wero Chavez MD   PARoxetine (PAXIL) 20 MG tablet Take 1 tablet by mouth every morning Yes Wero Chavez MD   atorvastatin (LIPITOR) 40 MG tablet Take 1 tablet by mouth daily Yes Wero Chavez MD   ciprofloxacin (CIPRO) 500 MG tablet Take 1 tablet by mouth 2 times daily for 10 days Yes Wero Chavez MD   EQ PAIN RELIEVER 500 MG tablet  Yes Historical Provider, MD   Incontinence Supply Disposable (INCONTINENCE BRIEF LARGE) MISC 1 each by Does not apply route daily  Wero Chavez MD       Social History     Tobacco Use    Smoking status: Current Every Day Smoker     Packs/day: 0.50     Years: 40.00     Pack years: 20.00     Start date: 1980    Smokeless tobacco: Never Used   Substance Use Topics    Alcohol use: Not Currently    Drug use: Never            PHYSICAL EXAMINATION:  [ INSTRUCTIONS:  \"[x]\" Indicates a positive item  \"[]\" Indicates a negative item  -- DELETE ALL ITEMS NOT EXAMINED]  Vital Signs: (As obtained by patient/caregiver or practitioner observation)    Blood pressure-  Heart rate-    Respiratory rate-    Temperature-  Pulse oximetry-     Constitutional: [x] Appears well-developed and well-nourished [x] No apparent distress      [] Abnormal-   Mental status  [x] Alert and awake  [x] Oriented to person/place/time [x]Able to follow commands      Eyes:  EOM    [x]  Normal  [] Abnormal-  Sclera  [x]  Normal  [] Abnormal -         Discharge [x]  None visible  [] Abnormal -    HENT:   [x] Normocephalic, atraumatic. [] Abnormal   [x] Mouth/Throat: Mucous membranes are moist.     External Ears [x] Normal  [] Abnormal-     Neck: [x] No visualized mass     Pulmonary/Chest: [x] Respiratory effort normal.  [x] No visualized signs of difficulty breathing or respiratory distress        [] Abnormal-      Musculoskeletal:   [] Normal gait with no signs of ataxia         [] Normal range of motion of neck        [] Abnormal-       Neurological:        [x] No Facial Asymmetry (Cranial nerve 7 motor function) (limited exam to video visit)          [x] No gaze palsy        [] Abnormal-         Skin:        [x] No significant exanthematous lesions or discoloration noted on facial skin         [] Abnormal-            Psychiatric:       [x] Normal Affect [x] No Hallucinations        [] Abnormal-     Other pertinent observable physical exam findings-     ASSESSMENT/PLAN: #1 sinus congestion #2 headache plan #1 ENT referral #2 follow-up with neurology #3 follow-up in 1 month. Return in about 4 weeks (around 6/9/2021) for MEDICATION CHECK, FOLLOW UP [address] Revonda Persia Holter, was evaluated through a synchronous (real-time) audio-video encounter. The patient (or guardian if applicable) is aware that this is a billable service. Verbal consent to proceed has been obtained within the past 12 months. The visit was conducted pursuant to the emergency declaration under the [address] [place] [address] and the [employer] Act. Patient identification was verified, and a caregiver was present when appropriate. The patient was located in a state where the provider was credentialed to provide care. This encounter was conducted via [employer]. Total time spent on this encounter: 20 minutes. --Wendy Winslow MD on 5/12/2021 at 11:54 AM    An electronic signature was used to authenticate this note.

## 2021-06-07 ENCOUNTER — ANTI-COAG VISIT (OUTPATIENT)
Dept: PRIMARY CARE CLINIC | Age: 61
End: 2021-06-07

## 2021-06-07 ENCOUNTER — NURSE ONLY (OUTPATIENT)
Dept: PRIMARY CARE CLINIC | Age: 61
End: 2021-06-07
Payer: COMMERCIAL

## 2021-06-07 DIAGNOSIS — Z95.2 HISTORY OF ARTIFICIAL HEART VALVE: Primary | ICD-10-CM

## 2021-06-07 LAB
INTERNATIONAL NORMALIZATION RATIO, POC: 1.7
PROTHROMBIN TIME, POC: NORMAL

## 2021-06-07 PROCEDURE — 85610 PROTHROMBIN TIME: CPT | Performed by: NURSE PRACTITIONER

## 2021-06-14 ENCOUNTER — OFFICE VISIT (OUTPATIENT)
Dept: PRIMARY CARE CLINIC | Age: 61
End: 2021-06-14
Payer: COMMERCIAL

## 2021-06-14 VITALS
DIASTOLIC BLOOD PRESSURE: 80 MMHG | HEART RATE: 88 BPM | WEIGHT: 148 LBS | SYSTOLIC BLOOD PRESSURE: 126 MMHG | HEIGHT: 66 IN | OXYGEN SATURATION: 98 % | BODY MASS INDEX: 23.78 KG/M2

## 2021-06-14 DIAGNOSIS — Z95.2 S/P AVR (AORTIC VALVE REPLACEMENT): ICD-10-CM

## 2021-06-14 DIAGNOSIS — Z11.4 SCREENING FOR HIV (HUMAN IMMUNODEFICIENCY VIRUS): ICD-10-CM

## 2021-06-14 DIAGNOSIS — Z95.2 HISTORY OF ARTIFICIAL HEART VALVE: Primary | ICD-10-CM

## 2021-06-14 DIAGNOSIS — R51.9 INTRACTABLE HEADACHE, UNSPECIFIED CHRONICITY PATTERN, UNSPECIFIED HEADACHE TYPE: ICD-10-CM

## 2021-06-14 DIAGNOSIS — F34.1 DYSTHYMIA: ICD-10-CM

## 2021-06-14 DIAGNOSIS — H10.12 ALLERGIC CONJUNCTIVITIS OF LEFT EYE: ICD-10-CM

## 2021-06-14 DIAGNOSIS — Z11.59 NEED FOR HEPATITIS C SCREENING TEST: ICD-10-CM

## 2021-06-14 DIAGNOSIS — E78.5 HYPERLIPIDEMIA, UNSPECIFIED HYPERLIPIDEMIA TYPE: ICD-10-CM

## 2021-06-14 DIAGNOSIS — Z79.01 ANTICOAGULANT LONG-TERM USE: ICD-10-CM

## 2021-06-14 LAB
INTERNATIONAL NORMALIZATION RATIO, POC: 2.1
PROTHROMBIN TIME, POC: 25.7

## 2021-06-14 PROCEDURE — 85610 PROTHROMBIN TIME: CPT | Performed by: NURSE PRACTITIONER

## 2021-06-14 PROCEDURE — 3017F COLORECTAL CA SCREEN DOC REV: CPT | Performed by: NURSE PRACTITIONER

## 2021-06-14 PROCEDURE — 4004F PT TOBACCO SCREEN RCVD TLK: CPT | Performed by: NURSE PRACTITIONER

## 2021-06-14 PROCEDURE — G8420 CALC BMI NORM PARAMETERS: HCPCS | Performed by: NURSE PRACTITIONER

## 2021-06-14 PROCEDURE — G8427 DOCREV CUR MEDS BY ELIG CLIN: HCPCS | Performed by: NURSE PRACTITIONER

## 2021-06-14 PROCEDURE — 99214 OFFICE O/P EST MOD 30 MIN: CPT | Performed by: NURSE PRACTITIONER

## 2021-06-14 RX ORDER — WARFARIN SODIUM 1 MG/1
1 TABLET ORAL DAILY
Qty: 30 TABLET | Refills: 0 | Status: SHIPPED
Start: 2021-06-14 | End: 2021-07-16 | Stop reason: CLARIF

## 2021-06-14 RX ORDER — ATORVASTATIN CALCIUM 40 MG/1
40 TABLET, FILM COATED ORAL DAILY
Qty: 30 TABLET | Refills: 3 | Status: SHIPPED
Start: 2021-06-14 | End: 2021-08-27 | Stop reason: SDUPTHER

## 2021-06-14 RX ORDER — SUMATRIPTAN 25 MG/1
25 TABLET, FILM COATED ORAL DAILY PRN
Qty: 9 TABLET | Refills: 0 | Status: SHIPPED
Start: 2021-06-14 | End: 2021-09-16 | Stop reason: SDUPTHER

## 2021-06-14 RX ORDER — AZELASTINE HYDROCHLORIDE 0.5 MG/ML
1 SOLUTION/ DROPS OPHTHALMIC 2 TIMES DAILY
Qty: 6 ML | Refills: 0 | Status: SHIPPED
Start: 2021-06-14 | End: 2021-06-24 | Stop reason: SDUPTHER

## 2021-06-14 RX ORDER — WARFARIN SODIUM 5 MG/1
5 TABLET ORAL DAILY
Qty: 30 TABLET | Refills: 3 | Status: SHIPPED
Start: 2021-06-14 | End: 2021-07-28

## 2021-06-14 RX ORDER — WARFARIN SODIUM 4 MG/1
4 TABLET ORAL DAILY
Qty: 30 TABLET | Refills: 3 | Status: SHIPPED | OUTPATIENT
Start: 2021-06-14 | End: 2021-08-02 | Stop reason: ALTCHOICE

## 2021-06-14 RX ORDER — PAROXETINE HYDROCHLORIDE 20 MG/1
20 TABLET, FILM COATED ORAL EVERY MORNING
Qty: 30 TABLET | Refills: 3 | Status: SHIPPED
Start: 2021-06-14 | End: 2021-09-16 | Stop reason: SDUPTHER

## 2021-06-14 ASSESSMENT — ENCOUNTER SYMPTOMS
CONSTIPATION: 0
VOMITING: 0
VOICE CHANGE: 0
SHORTNESS OF BREATH: 0
BACK PAIN: 0
COUGH: 0
DIARRHEA: 0
FACIAL SWELLING: 0
WHEEZING: 0
NAUSEA: 0
ABDOMINAL DISTENTION: 0
COLOR CHANGE: 0
EYE DISCHARGE: 1
EYE ITCHING: 1
APNEA: 0
CHEST TIGHTNESS: 0
ABDOMINAL PAIN: 0
RHINORRHEA: 0

## 2021-06-14 NOTE — PROGRESS NOTES
TONSILLECTOMY         History reviewed. No pertinent family history. Social History     Tobacco Use    Smoking status: Current Every Day Smoker     Packs/day: 0.50     Years: 40.00     Pack years: 20.00     Start date: 8/4/1980    Smokeless tobacco: Never Used   Substance Use Topics    Alcohol use: Not Currently    Drug use: Never       Medications:     Current Outpatient Medications:     warfarin (COUMADIN) 5 MG tablet, Take 1 tablet by mouth daily, Disp: 30 tablet, Rfl: 3    warfarin (COUMADIN) 4 MG tablet, Take 1 tablet by mouth daily, Disp: 30 tablet, Rfl: 3    PARoxetine (PAXIL) 20 MG tablet, Take 1 tablet by mouth every morning, Disp: 30 tablet, Rfl: 3    atorvastatin (LIPITOR) 40 MG tablet, Take 1 tablet by mouth daily, Disp: 30 tablet, Rfl: 3    warfarin (COUMADIN) 1 MG tablet, Take 1 tablet by mouth daily, Disp: 30 tablet, Rfl: 0    SUMAtriptan (IMITREX) 25 MG tablet, Take 1 tablet by mouth daily as needed for Migraine, Disp: 9 tablet, Rfl: 0    azelastine (OPTIVAR) 0.05 % ophthalmic solution, Place 1 drop into the left eye 2 times daily, Disp: 6 mL, Rfl: 0    EQ PAIN RELIEVER 500 MG tablet, , Disp: , Rfl:     Incontinence Supply Disposable (INCONTINENCE BRIEF LARGE) MISC, 1 each by Does not apply route daily, Disp: 2 Package, Rfl: 3    Allergies   Allergen Reactions    Pcn [Penicillins]     Doxycycline Hyclate Nausea And Vomiting       Review of Systems:   Review of Systems   Constitutional: Negative for activity change, appetite change, fatigue, fever and unexpected weight change. HENT: Negative for congestion, facial swelling, mouth sores, postnasal drip, rhinorrhea, sneezing, tinnitus and voice change. Eyes: Positive for discharge and itching. Left   Respiratory: Negative for apnea, cough, chest tightness, shortness of breath and wheezing. Cardiovascular: Positive for leg swelling. Negative for chest pain and palpitations.    Gastrointestinal: Negative for abdominal distention, abdominal pain, constipation, diarrhea, nausea and vomiting. Endocrine: Negative for cold intolerance and heat intolerance. Genitourinary: Negative for difficulty urinating, dysuria, flank pain, frequency, pelvic pain and urgency. Musculoskeletal: Negative for back pain, gait problem, joint swelling, myalgias and neck pain. Skin: Negative for color change, pallor, rash and wound. Allergic/Immunologic: Negative for environmental allergies and food allergies. Neurological: Positive for headaches. Negative for dizziness, tremors, seizures, syncope, facial asymmetry, speech difficulty, weakness, light-headedness and numbness. Psychiatric/Behavioral: Negative for agitation, behavioral problems, confusion, decreased concentration, dysphoric mood, sleep disturbance and suicidal ideas. The patient is nervous/anxious. The patient is not hyperactive. Physical Exam:   Vital Signs:  /80 (Site: Left Upper Arm, Position: Sitting, Cuff Size: Medium Adult)   Pulse 88   Ht 5' 6\" (1.676 m)   Wt 148 lb (67.1 kg)   SpO2 98%   BMI 23.89 kg/m²    Oxygen Saturation Interpretation: Normal.  Physical Exam  Vitals and nursing note reviewed. Constitutional:       Appearance: Normal appearance. She is normal weight. HENT:      Head: Normocephalic and atraumatic. Right Ear: Tympanic membrane, ear canal and external ear normal.      Left Ear: Tympanic membrane, ear canal and external ear normal.      Nose: Nose normal.      Mouth/Throat:      Mouth: Mucous membranes are moist.      Pharynx: Oropharynx is clear. Eyes:      Extraocular Movements: Extraocular movements intact. Conjunctiva/sclera: Conjunctivae normal.      Pupils: Pupils are equal, round, and reactive to light. Cardiovascular:      Rate and Rhythm: Normal rate and regular rhythm. Pulses:           Dorsalis pedis pulses are 1+ on the right side and 1+ on the left side. Heart sounds: Murmur heard.      Pulmonary: Effort: Pulmonary effort is normal.      Breath sounds: Normal breath sounds. No wheezing, rhonchi or rales. Abdominal:      General: Bowel sounds are normal. There is no distension. Palpations: Abdomen is soft. Tenderness: There is no abdominal tenderness. There is no rebound. Musculoskeletal:         General: Normal range of motion. Cervical back: Normal range of motion and neck supple. Right lower le+ Edema present. Left lower le+ Edema present. Skin:     General: Skin is warm and dry. Capillary Refill: Capillary refill takes less than 2 seconds. Neurological:      General: No focal deficit present. Mental Status: She is alert and oriented to person, place, and time. Psychiatric:         Mood and Affect: Mood normal.         Behavior: Behavior normal.         Thought Content: Thought content normal.         Judgment: Judgment normal.         Health Maintenance:     Health Maintenance   Topic Date Due    Hepatitis C screen  Never done    Pneumococcal 0-64 years Vaccine (1 of 2 - PPSV23) Never done    HIV screen  Never done    DTaP/Tdap/Td vaccine (1 - Tdap) Never done    Cervical cancer screen  Never done    Breast cancer screen  Never done    Shingles Vaccine (2 of 2) 2021    Lipid screen  2021    Colon Cancer Screen FIT/FOBT  2021    Flu vaccine  Completed    COVID-19 Vaccine  Completed    Hepatitis A vaccine  Aged Out    Hepatitis B vaccine  Aged Out    Hib vaccine  Aged Out    Meningococcal (ACWY) vaccine  Aged [employer] History   Administered Date(s) Administered    COVID-19, Pfizer, PF, 30mcg/0.3mL 03/10/2021, 2021    Influenza, Quadv, IM, PF (6 mo and older Fluzone, Flulaval, Fluarix, and 3 yrs and older Afluria) 2020        Testing: All laboratory and radiology results have been personally reviewed by myself.   Labs:  Results for orders placed or performed in visit on 21   POCT INR Future    Screening for HIV (human immunodeficiency virus)  -     HIV Screen; Future      Lab work is ordered and will be obtained when the patient is fasting, will call with results. Increase warfarin to 6 mg daily. Recheck INR in 10 days. Goal of 2.5-3.5. Trial azelastine ophthalmic solution for allergic conjunctivitis. Make an appointment for dilated eye exam.  Order placed for echocardiogram, will call with results. Referral placed to cardiology to establish care. Will trial Imitrex for migraine-like symptoms. Red flag symptoms discussed. Avoid ibuprofen given that the patient is anticoagulated. Follow-up with neurology. Call or go to ED immediately if symptoms worsen or persist.  Return in about 3 months for follow-up. Sooner if necessary. At that time we will discuss further health maintenance screenings. Counseled regarding above diagnosis, including possible risks and complications,especially if left uncontrolled. Counseled regarding the possible side effects, risks, benefits and alternatives to treatment; patient and/or guardian verbalizes understanding. Advised patient to call with any new medication issues. All questions answered. Reviewed age and gender appropriate health screening exams and vaccinations. Advised patient regarding importance of keeping up with recommended health maintenance and to schedule as soon as possible if overdue, as this is important in assessing for undiagnosed pathology, especially cancer. Patient verbalizes understanding and agrees.      SIGNATURE: Electronically signed by RANDI Crouch NP on 6/14/2021 at 2:21 PM

## 2021-06-15 LAB
25-HYDROXY VITAMIN D-3: 32.5 NG/ML (ref 30–100)
ANTIBODY: <0.1
CHOLESTEROL: 140 MG/DL (ref 140–200)
HDLC SERPL-MCNC: 42 MG/DL (ref 35–85)
HIV AG/AB: NORMAL
LDL CHOLESTEROL: 78 MG/DL
LDL/HDL RATIO: 1.9 RATIO
TRIGL SERPL-MCNC: 96 MG/DL (ref 41–189)
TSH SERPL DL<=0.05 MIU/L-ACNC: 5.22 UIU/ML (ref 0.34–5.6)

## 2021-06-16 ENCOUNTER — TELEPHONE (OUTPATIENT)
Dept: ADMINISTRATIVE | Age: 61
End: 2021-06-16

## 2021-06-16 ENCOUNTER — TELEPHONE (OUTPATIENT)
Dept: PRIMARY CARE CLINIC | Age: 61
End: 2021-06-16

## 2021-06-16 LAB
HEPATITIS C ANTIBODY: <0.1 S/CO RATIO (ref 0–0.9)
HIV 1+2 AB+HIV1P24 AG, EIA: NON REACTIVE

## 2021-06-16 NOTE — TELEPHONE ENCOUNTER
Patient Appointment Form:      PCP: Isrrael Hannon NP  Referring: same    Has the Patient:    Seen a Cardiologist? yes    date:2-3 yrs ago  [de-identified] recall  Alok Carlson    Had a heart catheterization? yes   date: approx 40 yrs ago  Hospital:  [facility]    Had heart surgery?  yes   date:  approx 40 yrs ago   surgeon: can't recall   hospital name:  [address]    Had a stress test or nuclear stress test? yes   date: a few yrs ago   [facility]    Had an echocardiogram? yes   date: a few years ago   facility name:  [address] [place] [address]    Had a vascular ultrasound? no    Had a 24/48 heart monitor or extended cardiac event monitor? no    Had recent blood work in the last 6 months? yes    date: 6/15/21    ordering physician: Isrrael Hannon    Had a pacemaker/ICD/ILR implant? no    Seen an Electrophysiologist? no      Will send records via: fax      Date & time of appointment: 6/30/21 1:00 Dr. Mague Parisi

## 2021-06-18 DIAGNOSIS — Z11.59 NEED FOR HEPATITIS C SCREENING TEST: ICD-10-CM

## 2021-06-18 DIAGNOSIS — Z11.4 SCREENING FOR HIV (HUMAN IMMUNODEFICIENCY VIRUS): ICD-10-CM

## 2021-06-18 DIAGNOSIS — R51.9 INTRACTABLE HEADACHE, UNSPECIFIED CHRONICITY PATTERN, UNSPECIFIED HEADACHE TYPE: ICD-10-CM

## 2021-06-24 ENCOUNTER — NURSE ONLY (OUTPATIENT)
Dept: PRIMARY CARE CLINIC | Age: 61
End: 2021-06-24
Payer: COMMERCIAL

## 2021-06-24 DIAGNOSIS — R51.9 INTRACTABLE HEADACHE, UNSPECIFIED CHRONICITY PATTERN, UNSPECIFIED HEADACHE TYPE: ICD-10-CM

## 2021-06-24 DIAGNOSIS — Z95.2 HISTORY OF ARTIFICIAL HEART VALVE: Primary | ICD-10-CM

## 2021-06-24 DIAGNOSIS — H10.12 ALLERGIC CONJUNCTIVITIS OF LEFT EYE: ICD-10-CM

## 2021-06-24 LAB
INTERNATIONAL NORMALIZATION RATIO, POC: 4.1
PROTHROMBIN TIME, POC: 48.9

## 2021-06-24 PROCEDURE — 85610 PROTHROMBIN TIME: CPT | Performed by: NURSE PRACTITIONER

## 2021-06-24 PROCEDURE — 93793 ANTICOAG MGMT PT WARFARIN: CPT | Performed by: NURSE PRACTITIONER

## 2021-06-24 RX ORDER — AZELASTINE HYDROCHLORIDE 0.5 MG/ML
1 SOLUTION/ DROPS OPHTHALMIC 2 TIMES DAILY
Qty: 6 ML | Refills: 0 | Status: SHIPPED | OUTPATIENT
Start: 2021-06-24 | End: 2021-07-24

## 2021-06-24 RX ORDER — SUMATRIPTAN 25 MG/1
25 TABLET, FILM COATED ORAL DAILY PRN
Qty: 9 TABLET | Refills: 0 | OUTPATIENT
Start: 2021-06-24

## 2021-06-24 NOTE — RESULT ENCOUNTER NOTE
Take Coumadin 5mg everyday, except M-W-F 6mg. Recheck in 10 days. Eye drops are okay during allergic season. If headaches aren't improved, she needs to f/u with neurology.

## 2021-07-13 ENCOUNTER — TELEPHONE (OUTPATIENT)
Dept: NEUROSURGERY | Age: 61
End: 2021-07-13

## 2021-07-13 NOTE — TELEPHONE ENCOUNTER
I called patient to schedule an appointment and had to leave a message.      Electronically signed by Kristyn Resendiz on 7/13/21 at 1:05 PM EDT

## 2021-07-16 ENCOUNTER — OFFICE VISIT (OUTPATIENT)
Dept: CARDIOLOGY CLINIC | Age: 61
End: 2021-07-16
Payer: COMMERCIAL

## 2021-07-16 VITALS
DIASTOLIC BLOOD PRESSURE: 72 MMHG | HEIGHT: 66 IN | HEART RATE: 77 BPM | SYSTOLIC BLOOD PRESSURE: 112 MMHG | WEIGHT: 147.6 LBS | BODY MASS INDEX: 23.72 KG/M2 | RESPIRATION RATE: 16 BRPM

## 2021-07-16 DIAGNOSIS — Z95.2 HISTORY OF ARTIFICIAL HEART VALVE: ICD-10-CM

## 2021-07-16 DIAGNOSIS — E78.5 HYPERLIPIDEMIA, UNSPECIFIED HYPERLIPIDEMIA TYPE: Primary | ICD-10-CM

## 2021-07-16 LAB
LEFT VENTRICULAR EJECTION FRACTION MODE: NORMAL
LV EF: NORMAL %

## 2021-07-16 PROCEDURE — G8420 CALC BMI NORM PARAMETERS: HCPCS | Performed by: INTERNAL MEDICINE

## 2021-07-16 PROCEDURE — 99204 OFFICE O/P NEW MOD 45 MIN: CPT | Performed by: INTERNAL MEDICINE

## 2021-07-16 PROCEDURE — 3017F COLORECTAL CA SCREEN DOC REV: CPT | Performed by: INTERNAL MEDICINE

## 2021-07-16 PROCEDURE — 4004F PT TOBACCO SCREEN RCVD TLK: CPT | Performed by: INTERNAL MEDICINE

## 2021-07-16 PROCEDURE — G8427 DOCREV CUR MEDS BY ELIG CLIN: HCPCS | Performed by: INTERNAL MEDICINE

## 2021-07-16 PROCEDURE — 93000 ELECTROCARDIOGRAM COMPLETE: CPT | Performed by: INTERNAL MEDICINE

## 2021-07-16 RX ORDER — GABAPENTIN 100 MG/1
CAPSULE ORAL
COMMUNITY
Start: 2021-06-25 | End: 2021-09-16

## 2021-07-16 NOTE — PROGRESS NOTES
Chief Complaint   Patient presents with    Cardiac Valve Problem       Patient Active Problem List    Diagnosis Date Noted    History of artificial heart valve 11/24/2020     Overview Note:     A. Mechanical AVR in 1990's [facility])      Hyperlipidemia 11/24/2020    Depression 11/24/2020       Current Outpatient Medications   Medication Sig Dispense Refill    gabapentin (NEURONTIN) 100 MG capsule       azelastine (OPTIVAR) 0.05 % ophthalmic solution Place 1 drop into the left eye 2 times daily 6 mL 0    warfarin (COUMADIN) 5 MG tablet Take 1 tablet by mouth daily (Patient taking differently: Take 5 mg by mouth four times a week ) 30 tablet 3    warfarin (COUMADIN) 4 MG tablet Take 1 tablet by mouth daily (Patient taking differently: Take 6 mg by mouth three times a week Monday, Wednesday, and Friday) 30 tablet 3    PARoxetine (PAXIL) 20 MG tablet Take 1 tablet by mouth every morning 30 tablet 3    atorvastatin (LIPITOR) 40 MG tablet Take 1 tablet by mouth daily 30 tablet 3    SUMAtriptan (IMITREX) 25 MG tablet Take 1 tablet by mouth daily as needed for Migraine 9 tablet 0    EQ PAIN RELIEVER 500 MG tablet       Incontinence Supply Disposable (INCONTINENCE BRIEF LARGE) MISC 1 each by Does not apply route daily 2 Package 3     No current facility-administered medications for this visit.         Allergies   Allergen Reactions    Pcn [Penicillins]     Doxycycline Hyclate Nausea And Vomiting       Vitals:    07/16/21 1123   BP: 112/72   Pulse: 77   Resp: 16   Weight: 147 lb 9.6 oz (67 kg)   Height: 5' 6\" (1.676 m)       Social History     Socioeconomic History    Marital status:      Spouse name: Not on file    Number of children: Not on file    Years of education: Not on file    Highest education level: Not on file   Occupational History    Not on file   Tobacco Use    Smoking status: Current Every Day Smoker     Packs/day: 0.50     Years: 40.00     Pack years: 20.00     Types: Cigarettes Start date: 8/4/1980    Smokeless tobacco: Never Used   Substance and Sexual Activity    Alcohol use: Not Currently    Drug use: Never    Sexual activity: Not on file   Other Topics Concern    Not on file   Social History Narrative    Not on file     Social Determinants of Health     Financial Resource Strain:     Difficulty of Paying Living Expenses:    Food Insecurity:     Worried About Running Out of Food in the Last Year:     [address] in the Last Year:    Transportation Needs:     Lack of Transportation (Medical):  Lack of Transportation (Non-Medical):    Physical Activity:     Days of Exercise per Week:     Minutes of Exercise per Session:    Stress:     Feeling of Stress :    Social Connections:     Frequency of Communication with Friends and Family:     Frequency of Social Gatherings with Friends and Family:     Attends [employer] Services:     Active Member of Clubs or Organizations:     Attends Club or Organization Meetings:     Marital Status:    Intimate Partner Violence:     Fear of Current or Ex-Partner:     Emotionally Abused:     Physically Abused:     Sexually Abused:        Family History   Family history unknown: Yes         SUBJECTIVE: Dawna العلي presents to the office today for consult - dr dowd - for cardiac evaluation. Hx of mechanical AVr 30 years ago for \"won't open\", likely bicuspid. [place] to [place]. Very sedentary, no work, no chores. .  She complains of no specific cardiac compliants and denies   chest pain, chest pressure/discomfort, claudication, exertional chest pressure/discomfort, irregular heart beat, lower extremity edema, near-syncope, orthopnea, palpitations, paroxysmal nocturnal dyspnea, syncope and tachypnea. Review of Systems:   Heart: as above   Lungs: as above   Eyes: denies changes in vision or discharge. Ears: denies changes in hearing or pain.    Nose: denies epistaxis or masses   Throat: denies sore throat or trouble swallowing. Neuro: denies numbness, tingling, tremors. Skin: denies rashes or itching. : denies hematuria, dysuria   GI: denies vomiting, diarrhea   Psych: denies mood changed, anxiety, depression. all others negative. Physical Exam   /72   Pulse 77   Resp 16   Ht 5' 6\" (1.676 m)   Wt 147 lb 9.6 oz (67 kg)   BMI 23.82 kg/m²   Constitutional: Oriented to person, place, and time. Well-developed and well-nourished. No distress. Head: Normocephalic and atraumatic. Eyes: EOM are normal. Pupils are equal, round, and reactive to light. Neck: Normal range of motion. Neck supple. No hepatojugular reflux and no JVD present. Carotid bruit is not present. No tracheal deviation present. No thyromegaly present. Cardiovascular: Normal rate, regular rhythm, normal heart sounds and intact distal pulses. Exam reveals no gallop and no friction rub. Short mechanical AVR murmur heard. no AI. Valve closure crisp and easily audible  Pulmonary/Chest: Effort normal and breath sounds normal. No respiratory distress. No wheezes. No rales. No tenderness. Abdominal: Soft. Bowel sounds are normal. No distension and no mass. No tenderness. No rebound and no guarding. Musculoskeletal: Normal range of motion. No edema and no tenderness. Neurological: Alert and oriented to person, place, and time. Skin: Skin is warm and dry. No rash noted. Not diaphoretic. No erythema. Psychiatric: Normal mood and affect. Behavior is normal.     EKG:  normal sinus rhythm, PAC's noted, rate 77, axis +47. ASSESSMENT AND PLAN:  Patient Active Problem List   Diagnosis    History of artificial heart valve    Hyperlipidemia    Depression     Patient with remote hx of mechanical AVR (? bicuspid) with no auscultatory evidence of malfunction  Echo has been ordered and will be reviewed.   If as expected, she would never need another echo again, barring change in symptoms  ekg normal  Stressed importance of finding a dentist for twice annual dental cleaning with SBE prophylaxis  She is following at anticoagulation clinic - target INR 2.5 (aortic position, bi-leaflet, normal LV, no prior embolism)    Juli Herring M.D  [facility]

## 2021-07-19 ENCOUNTER — NURSE ONLY (OUTPATIENT)
Dept: PRIMARY CARE CLINIC | Age: 61
End: 2021-07-19
Payer: COMMERCIAL

## 2021-07-19 ENCOUNTER — HOSPITAL ENCOUNTER (EMERGENCY)
Age: 61
Discharge: HOME | End: 2021-07-19
Payer: COMMERCIAL

## 2021-07-19 VITALS
TEMPERATURE: 98.8 F | HEART RATE: 95 BPM | OXYGEN SATURATION: 98 % | SYSTOLIC BLOOD PRESSURE: 134 MMHG | RESPIRATION RATE: 14 BRPM | BODY MASS INDEX: 23.95 KG/M2 | DIASTOLIC BLOOD PRESSURE: 63 MMHG | HEIGHT: 66 IN | WEIGHT: 149 LBS

## 2021-07-19 DIAGNOSIS — Z95.2 HISTORY OF ARTIFICIAL HEART VALVE: Primary | ICD-10-CM

## 2021-07-19 LAB
INTERNATIONAL NORMALIZATION RATIO, POC: >8
PROTHROMBIN TIME, POC: >96

## 2021-07-19 PROCEDURE — 85610 PROTHROMBIN TIME: CPT | Performed by: NURSE PRACTITIONER

## 2021-07-19 PROCEDURE — 93793 ANTICOAG MGMT PT WARFARIN: CPT | Performed by: NURSE PRACTITIONER

## 2021-07-19 ASSESSMENT — PAIN DESCRIPTION - LOCATION: LOCATION: HEAD

## 2021-07-19 ASSESSMENT — PAIN DESCRIPTION - PROGRESSION: CLINICAL_PROGRESSION: NOT CHANGED

## 2021-07-19 ASSESSMENT — PAIN DESCRIPTION - PAIN TYPE: TYPE: ACUTE PAIN

## 2021-07-19 ASSESSMENT — PAIN DESCRIPTION - FREQUENCY: FREQUENCY: CONTINUOUS

## 2021-07-19 ASSESSMENT — PAIN SCALES - GENERAL: PAINLEVEL_OUTOF10: 9

## 2021-07-19 ASSESSMENT — PAIN DESCRIPTION - DESCRIPTORS: DESCRIPTORS: PATIENT UNABLE TO DESCRIBE

## 2021-07-19 NOTE — PROGRESS NOTES
Per alexis pt was advised to go to the er for vitamin k.  Pt was also c/o severe cough, congestion and was in the er once last week for this and covid was negative and pt states no antibiotic was given

## 2021-07-19 NOTE — ED NOTES
FIRST PROVIDER CONTACT ASSESSMENT NOTE                                                                                                Department of Emergency Medicine                                                      First Provider Note  21  3:54 PM EDT  NAME: Janalyn Maria Holter  : 1960  MRN: [id number]    Chief Complaint: Migraine (headache x 3 weeks started after covid vaccine, sinus congestion, cough, elevated INR, seen last night at [facility] states they didnt do anything for her, states covis negative last night), Cough, and Abnormal Lab      History of Present Illness:   eDlia Delgado is a 56 y.o. female who presents to the ED for elevated INR last night at [facility] ER. Focused Physical Exam:  VS:    ED Triage Vitals   BP Temp Temp Source Pulse Resp SpO2 Height Weight   21 1449 21 1449 21 1449 21 1437 21 1437 21 1437 21 1449 21 1449   134/63 98.8 °F (37.1 °C) Tympanic 89 16 98 % 5' 6\" (1.676 m) 149 lb (67.6 kg)        General: Alert and in no apparent distress. Medical History:  has a past medical history of Anxiety, Depression, and Hyperlipidemia. Surgical History:  has a past surgical history that includes Aortic valve replacement (); Tonsillectomy; and other surgical history. Social History:  reports that she has been smoking cigarettes. She started smoking about 40 years ago. She has a 20.00 pack-year smoking history. She has never used smokeless tobacco. She reports previous alcohol use. She reports that she does not use drugs. Family History: Family history is unknown by patient.     Allergies: Pcn [penicillins] and Doxycycline hyclate     Initial Plan of Care:  Initiate Treatment-Testing, Proceed toTreatment Area When Bed Available for ED Attending/MLP to Continue Care    -------------------------------------------------END OF FIRST PROVIDER CONTACT ASSESSMENT NOTE--------------------------------------------------------  Electronically signed by KAPIL Elizondo   DD: 7/19/21       KAPIL Hough  07/19/21 1554

## 2021-07-20 ENCOUNTER — ANTI-COAG VISIT (OUTPATIENT)
Dept: PRIMARY CARE CLINIC | Age: 61
End: 2021-07-20

## 2021-07-20 ENCOUNTER — CARE COORDINATION (OUTPATIENT)
Dept: CARE COORDINATION | Age: 61
End: 2021-07-20

## 2021-07-20 ENCOUNTER — NURSE ONLY (OUTPATIENT)
Dept: PRIMARY CARE CLINIC | Age: 61
End: 2021-07-20
Payer: COMMERCIAL

## 2021-07-20 DIAGNOSIS — Z95.2 HISTORY OF ARTIFICIAL HEART VALVE: Primary | ICD-10-CM

## 2021-07-20 LAB
INTERNATIONAL NORMALIZATION RATIO, POC: 1.6
PROTHROMBIN TIME, POC: 19.7

## 2021-07-20 PROCEDURE — 85610 PROTHROMBIN TIME: CPT | Performed by: NURSE PRACTITIONER

## 2021-07-20 NOTE — CARE COORDINATION
-ACM contacted patient's daughter, Dirk Nichols (on HIPAA) to follow up on patient's status after ED visit on 07/19/21. Patient was advised to present to the ED d/t critically high INR level.   -Daughter states the patient did leave [facility] ED without being seen, and went to \[facility]\"" ED d/t this facility is closer for the family if patient needed to be admitted.   -Daughter states that INR level was critically high at \[facility]\"" and patient did receive a \"little dose of Vitamin K \"and was told to go to PCP office today for another INR level. -ACM educated daughter on the importance of having another INR check today d/t the critical levels on 07/19/21. Daughter stated understanding. -[facility] also offered enrollment into Care Coordination. Daughter accepted enrollment on behalf of her mother. Daughter was unable to start the enrollment process today. ACM discussed her availability and determined a date and time when ACM could call to start this process. PLAN:  Contact patient's daughter on agreed upon date and time to complete enrollment into Care Coordination.

## 2021-07-23 ENCOUNTER — NURSE ONLY (OUTPATIENT)
Dept: PRIMARY CARE CLINIC | Age: 61
End: 2021-07-23
Payer: COMMERCIAL

## 2021-07-23 ENCOUNTER — ANTI-COAG VISIT (OUTPATIENT)
Dept: PRIMARY CARE CLINIC | Age: 61
End: 2021-07-23

## 2021-07-23 DIAGNOSIS — Z95.2 HISTORY OF ARTIFICIAL HEART VALVE: Primary | ICD-10-CM

## 2021-07-23 LAB
INTERNATIONAL NORMALIZATION RATIO, POC: 1.6
PROTHROMBIN TIME, POC: 18.8

## 2021-07-23 PROCEDURE — 85610 PROTHROMBIN TIME: CPT | Performed by: NURSE PRACTITIONER

## 2021-07-26 ENCOUNTER — CARE COORDINATION (OUTPATIENT)
Dept: CARE COORDINATION | Age: 61
End: 2021-07-26

## 2021-07-26 SDOH — ECONOMIC STABILITY: TRANSPORTATION INSECURITY
IN THE PAST 12 MONTHS, HAS THE LACK OF TRANSPORTATION KEPT YOU FROM MEDICAL APPOINTMENTS OR FROM GETTING MEDICATIONS?: NO

## 2021-07-26 SDOH — HEALTH STABILITY: PHYSICAL HEALTH: ON AVERAGE, HOW MANY DAYS PER WEEK DO YOU ENGAGE IN MODERATE TO STRENUOUS EXERCISE (LIKE A BRISK WALK)?: 0 DAYS

## 2021-07-26 SDOH — ECONOMIC STABILITY: FOOD INSECURITY: WITHIN THE PAST 12 MONTHS, THE FOOD YOU BOUGHT JUST DIDN'T LAST AND YOU DIDN'T HAVE MONEY TO GET MORE.: NEVER TRUE

## 2021-07-26 SDOH — ECONOMIC STABILITY: FOOD INSECURITY: WITHIN THE PAST 12 MONTHS, YOU WORRIED THAT YOUR FOOD WOULD RUN OUT BEFORE YOU GOT MONEY TO BUY MORE.: NEVER TRUE

## 2021-07-26 SDOH — ECONOMIC STABILITY: TRANSPORTATION INSECURITY
IN THE PAST 12 MONTHS, HAS LACK OF TRANSPORTATION KEPT YOU FROM MEETINGS, WORK, OR FROM GETTING THINGS NEEDED FOR DAILY LIVING?: NO

## 2021-07-26 SDOH — HEALTH STABILITY: PHYSICAL HEALTH: ON AVERAGE, HOW MANY MINUTES DO YOU ENGAGE IN EXERCISE AT THIS LEVEL?: 0 MIN

## 2021-07-26 ASSESSMENT — SOCIAL DETERMINANTS OF HEALTH (SDOH)
HOW OFTEN DO YOU ATTENT MEETINGS OF THE CLUB OR ORGANIZATION YOU BELONG TO?: NEVER
HOW OFTEN DO YOU GET TOGETHER WITH FRIENDS OR RELATIVES?: NEVER
HOW HARD IS IT FOR YOU TO PAY FOR THE VERY BASICS LIKE FOOD, HOUSING, MEDICAL CARE, AND HEATING?: NOT HARD AT ALL
IN A TYPICAL WEEK, HOW MANY TIMES DO YOU TALK ON THE PHONE WITH FAMILY, FRIENDS, OR NEIGHBORS?: MORE THAN THREE TIMES A WEEK
DO YOU BELONG TO ANY CLUBS OR ORGANIZATIONS SUCH AS CHURCH GROUPS UNIONS, FRATERNAL OR ATHLETIC GROUPS, OR SCHOOL GROUPS?: NO
HOW OFTEN DO YOU ATTEND CHURCH OR RELIGIOUS SERVICES?: NEVER

## 2021-07-26 ASSESSMENT — LIFESTYLE VARIABLES: HOW OFTEN DO YOU HAVE A DRINK CONTAINING ALCOHOL: NEVER

## 2021-07-26 NOTE — LETTER
7/26/2021    Cloria Slovak Holter  [address]  [facility] [address]    Dear Cloria Slovak Holter,    I enjoyed speaking with you and wanted to send some additional information. Courtney Delgado CNP and I will work together to ensure your needs are met and help you achieve your health goals. We are committed to walk with you on this journey and look forward to working with you. Please feel free to contact me with any questions or concerns. I am available by phone. You can reach me at [phone].     In good health,     Robby Rainey RN

## 2021-07-26 NOTE — CARE COORDINATION
Ambulatory Care Coordination Note  7/26/2021  CM Risk Score: 0  Charlson 10 Year Mortality Risk Score: 10%     ACC: Nate Banuelos RN    Summary Note:   AC contacted daughter, Jelly Zabala (on HIPAA) to complete enrollment into Care Coordination. Medications were also reviewed on this outreach. Patient lives with her daughter and her family in a ranch style home with a basement. There are 2 steps to enter. Patient does not utilize the basement (Laundry is done by daughter's ). Patient does have a license and she will drive when she needs to. Patient is independent with ADLS. She requires minimal assistance with IADLS. Patient has had minimal falls d/t having a shunt placed in 2019. Patient does follow with Jai Mai NP and her medications are filled at [facility] in [place], [place]. Jelly Zabala is aware of all upcoming appointments and does plan on attending appointments as scheduled. Patient's daughter does manage patient's My Chart account. Daughter stated that patient's INR level is no longer at a critical value. Her PCP is monitoring her INR closely. Daughter states patient does smoke approximately 3-5 cigarettes per day and does not think she will stop smoking. Advance Directive  Patient has NOT completed an advance directive , Patient has a documented healthcare surrogate and Discussed the importance of establishing and updating an advance directive. Patient does not have questions at this time  Discussed with: Family member   MATT discussed ACP with daughter. She states she works at an [employer] and she will assist her mother in completing this documentation. PLAN  -Continue Care Coordination  -F/U to ensure patient attends all scheduled appointments. -F/U to determine if there are new recommendations after attending neurology appointment.     Ambulatory Care Coordination Assessment    Care Coordination Protocol  Program Enrollment: Complex Care  Referral from Primary Care Provider: No  Week 1 - Initial Assessment     Do you have all of your prescriptions and are they filled?: Yes  Barriers to medication adherence: None  Are you able to afford your medications?: Yes  How often do you have trouble taking your medications the way you have been told to take them?: I always take them as prescribed. Do you have Home O2 Therapy?: No      Ability to seek help/take action for Emergent Urgent situations i.e. fire, crime, inclement weather or health crisis. : Independent  Ability to ambulate to restroom: Independent  Ability handle personal hygeine needs (bathing/dressing/grooming): Independent  Ability to manage Medications: Independent  Ability to prepare Food Preparation: Needs Assistance  Ability to maintain home (clean home, laundry): Needs Assistance  Ability to drive and/or has transportation: Independent  Ability to do shopping: Independent  Ability to manage finances:  Independent  Is patient able to live independently?: Yes     Current Housing: Private Residence        Per the Fall Risk Screening, did the patient have 2 or more falls or 1 fall with injury in the past year?: Yes  Use of a Mobility Aid: No  Difficulty walking/impaired gait: No  Issues with feet or shoes like numbness, edema, shoes not fitting: No  Changes in vision, poor vision or poor lighting in environment: No  Dizziness: Yes (Comment: 07/26/21: Not often)  Other Fall Risk: No     Frequent urination at night?: No  Do you use rails/bars?: Yes  Do you have a non-slip tub mat?: Yes     Are you experiencing loss of meaning?: No  Are you experiencing loss of hope and peace?: No     Thinking about your patient's physical health needs, are there any symptoms or problems (risk indicators) you are unsure about that require further investigation?: No identified areas of uncertainly or problems already being investigated   Are the patients physical health problems impacting on their mental well-being?: No identified areas of

## 2021-07-28 ENCOUNTER — OFFICE VISIT (OUTPATIENT)
Dept: NEUROSURGERY | Age: 61
End: 2021-07-28
Payer: COMMERCIAL

## 2021-07-28 VITALS
RESPIRATION RATE: 16 BRPM | HEART RATE: 58 BPM | SYSTOLIC BLOOD PRESSURE: 131 MMHG | TEMPERATURE: 97.3 F | OXYGEN SATURATION: 96 % | DIASTOLIC BLOOD PRESSURE: 74 MMHG | BODY MASS INDEX: 23.95 KG/M2 | HEIGHT: 66 IN | WEIGHT: 149 LBS

## 2021-07-28 DIAGNOSIS — G91.2 NPH (NORMAL PRESSURE HYDROCEPHALUS) (HCC): Primary | ICD-10-CM

## 2021-07-28 PROCEDURE — G8427 DOCREV CUR MEDS BY ELIG CLIN: HCPCS | Performed by: PHYSICIAN ASSISTANT

## 2021-07-28 PROCEDURE — 3017F COLORECTAL CA SCREEN DOC REV: CPT | Performed by: PHYSICIAN ASSISTANT

## 2021-07-28 PROCEDURE — 4004F PT TOBACCO SCREEN RCVD TLK: CPT | Performed by: PHYSICIAN ASSISTANT

## 2021-07-28 PROCEDURE — 99213 OFFICE O/P EST LOW 20 MIN: CPT | Performed by: PHYSICIAN ASSISTANT

## 2021-07-28 PROCEDURE — G8420 CALC BMI NORM PARAMETERS: HCPCS | Performed by: PHYSICIAN ASSISTANT

## 2021-07-28 RX ORDER — OXCARBAZEPINE 150 MG/1
TABLET, FILM COATED ORAL
COMMUNITY
Start: 2021-07-01 | End: 2021-09-16

## 2021-07-28 RX ORDER — CLINDAMYCIN HYDROCHLORIDE 300 MG/1
CAPSULE ORAL
COMMUNITY
Start: 2021-07-21 | End: 2021-09-16

## 2021-07-28 RX ORDER — METRONIDAZOLE 500 MG/1
TABLET ORAL
COMMUNITY
Start: 2021-07-21 | End: 2021-09-16

## 2021-07-28 RX ORDER — PREDNISONE 10 MG/1
TABLET ORAL
COMMUNITY
Start: 2021-07-22 | End: 2021-09-16

## 2021-07-28 ASSESSMENT — ENCOUNTER SYMPTOMS
PHOTOPHOBIA: 0
TROUBLE SWALLOWING: 0
SHORTNESS OF BREATH: 0
ABDOMINAL PAIN: 0

## 2021-07-28 NOTE — PROGRESS NOTES
Subjective:      Patient ID: Dawna العلي is a 56 y.o. female. Sindhu Jay is a 56year old female with a past medical history of aortic valve replacement and previous  shunt placement about 2-3 years ago in [place]. Pt takes coumadin daily. She presents to the office today as a new patient to establish care of  shunt. Pt states she is experiencing increased headaches for the past 4 months. Headaches do resolve with Imitrex. She does see a neurologist in [facility]. Pt states she also has unsteady gait, memory issues, and incontinence. She had a head CT scan performed at [facility], but they did not bring a CD to review images. Denies pain down the extremities, neck pain, back pain, numbness, tingling, fever, chills, SOB, chest pain, or abdominal pain. Review of Systems   Constitutional: Negative for fever. HENT: Negative for trouble swallowing. Eyes: Negative for photophobia. Respiratory: Negative for shortness of breath. Cardiovascular: Negative for chest pain. Gastrointestinal: Negative for abdominal pain. Endocrine: Negative for heat intolerance. Genitourinary: Positive for frequency and urgency. Musculoskeletal: Positive for gait problem. Skin: Negative for wound. Neurological: Positive for headaches. Psychiatric/Behavioral: Positive for confusion. Objective:   Physical Exam  Constitutional:       Appearance: Normal appearance. She is well-developed. HENT:      Head: Normocephalic and atraumatic. Eyes:      Extraocular Movements: Extraocular movements intact. Conjunctiva/sclera: Conjunctivae normal.      Pupils: Pupils are equal, round, and reactive to light. Cardiovascular:      Rate and Rhythm: Normal rate. Pulmonary:      Effort: Pulmonary effort is normal.   Abdominal:      General: There is no distension. Musculoskeletal:      Cervical back: Normal range of motion and neck supple. Skin:     General: Skin is warm and dry.    Neurological: Mental Status: She is alert. Comments: Alert and oriented x3  CN3-12 intact  Motor strength full  Sensation intact to light touch     Psychiatric:         Thought Content: Thought content normal.         Assessment: This is a 56year old presenting to establish care for  shunt placement.        Plan:      -Discussed with patient and family to obtain CD of head CT scan for us to review  -Shunt series x-rays ordered  -Follow up in neurosurgery clinic with neurosurgeon after completion of imaging to discuss results and further treatment plan        Kyle Meléndez PA-C

## 2021-07-30 ENCOUNTER — ANTI-COAG VISIT (OUTPATIENT)
Dept: FAMILY MEDICINE CLINIC | Age: 61
End: 2021-07-30

## 2021-07-30 ENCOUNTER — NURSE ONLY (OUTPATIENT)
Dept: PRIMARY CARE CLINIC | Age: 61
End: 2021-07-30
Payer: COMMERCIAL

## 2021-07-30 DIAGNOSIS — Z95.2 HISTORY OF ARTIFICIAL HEART VALVE: Primary | ICD-10-CM

## 2021-07-30 LAB — INTERNATIONAL NORMALIZATION RATIO, POC: 4.5

## 2021-07-30 PROCEDURE — 85610 PROTHROMBIN TIME: CPT | Performed by: NURSE PRACTITIONER

## 2021-08-02 ENCOUNTER — NURSE ONLY (OUTPATIENT)
Dept: PRIMARY CARE CLINIC | Age: 61
End: 2021-08-02
Payer: COMMERCIAL

## 2021-08-02 ENCOUNTER — TELEPHONE (OUTPATIENT)
Dept: PRIMARY CARE CLINIC | Age: 61
End: 2021-08-02

## 2021-08-02 DIAGNOSIS — Z95.2 HISTORY OF ARTIFICIAL HEART VALVE: Primary | ICD-10-CM

## 2021-08-02 LAB
INTERNATIONAL NORMALIZATION RATIO, POC: 1.8
PROTHROMBIN TIME, POC: 21.4

## 2021-08-02 PROCEDURE — 93793 ANTICOAG MGMT PT WARFARIN: CPT | Performed by: NURSE PRACTITIONER

## 2021-08-02 PROCEDURE — 85610 PROTHROMBIN TIME: CPT | Performed by: NURSE PRACTITIONER

## 2021-08-02 RX ORDER — WARFARIN SODIUM 1 MG/1
TABLET ORAL
Qty: 30 TABLET | Refills: 3 | Status: SHIPPED
Start: 2021-08-02 | End: 2021-09-16 | Stop reason: SDUPTHER

## 2021-08-02 NOTE — TELEPHONE ENCOUNTER
Daughter states that she is taking 5mg yesterday. Past that she is not sure what the patient is taking. I called Kamini Llanos to give her this information, Kamini Llanos ordered 1 mg on all days except Monday Wednesday and Friday when she will take 2mg. The rx will be sent to the pharmacy and patient will recheck on Monday. Daughter is aware of this.

## 2021-08-02 NOTE — PROGRESS NOTES
I spoke with the daughter, Cassandra Chmabers. The patient is unavailable at this time. INR on Friday was 4.5. The patient was unsure what dose of coumadin she was taking, but was not taking the medication as prescribed. She was suppose to call with the dose on Friday, but did not. She was ordered 1mg daily, except M-W-F to take 2 mg. She has been taking \"one pill a day, but don't know the dose. \"  I advised daughter to bring patient in TODAY for an INR check and the pill bottle of the medication she has been taking daily. She verbalized understanding. Further orders pending medication clarification and INR check.

## 2021-08-06 ENCOUNTER — CARE COORDINATION (OUTPATIENT)
Dept: CARE COORDINATION | Age: 61
End: 2021-08-06

## 2021-08-06 NOTE — CARE COORDINATION
ACM attempted to contact patient to offer enrollment into Care Coordination. ACM left a voice message with contact information requesting a return call. PLAN  -If no return call, continue to attempt to contact patient.

## 2021-08-09 ENCOUNTER — NURSE ONLY (OUTPATIENT)
Dept: PRIMARY CARE CLINIC | Age: 61
End: 2021-08-09
Payer: COMMERCIAL

## 2021-08-09 DIAGNOSIS — Z95.2 HISTORY OF ARTIFICIAL HEART VALVE: Primary | ICD-10-CM

## 2021-08-09 LAB
INTERNATIONAL NORMALIZATION RATIO, POC: 2.4
PROTHROMBIN TIME, POC: 28.8

## 2021-08-09 PROCEDURE — 85610 PROTHROMBIN TIME: CPT | Performed by: NURSE PRACTITIONER

## 2021-08-10 ENCOUNTER — CARE COORDINATION (OUTPATIENT)
Dept: CARE COORDINATION | Age: 61
End: 2021-08-10

## 2021-08-10 NOTE — CARE COORDINATION
Ambulatory Care Coordination Note  8/10/2021  CM Risk Score: 0  Charlson 10 Year Mortality Risk Score: 10%     ACC: Deuce Ann, RN    Summary Note:   ACM contacted [address] (patient's daughter, on HIPAA) to follow up on patient's status. Laury Cullen states her mother is doing well. Laury Cullen is aware that her mothers PT/INR is therapeutic and she is to continue the same regimen of coumadin with a re-check for next week. Laury Cullen states patient will attend her scheduled appointment for an echo on 08/13/21. [address] also stated that patient continues to smoke approximately 3-5 cigarettes per day. PLAN  -Continue Care Coordination  -F/U to endure PT/INR is remaining within normal range. -F/U to review status of echo. Care Coordination Interventions    Program Enrollment: Complex Care  Referral from Primary Care Provider: No  Suggested Interventions and Community Resources  Fall Risk Prevention: Completed  Registered Dietician: Declined  Smoking Cessation: Declined         Goals Addressed                 This Visit's Progress     Conditions and Symptoms   On track     I will schedule office visits, as directed by my provider. I will keep my appointment or reschedule if I have to cancel. I will notify my provider of any symptoms that indicate a worsening of my condition. Barriers: overwhelmed by complexity of regimen  Plan for overcoming my barriers: Care Coordination, Remind patient/family of upcoming appointments. Confidence: 8/10  Anticipated Goal Completion Date: 10/26/21              Prior to Admission medications    Medication Sig Start Date End Date Taking? Authorizing Provider   warfarin (COUMADIN) 1 MG tablet Take 1 tablet by mouth daily on Sunday, Tuesday, Thursday and Saturday. Take 2 tablets by mouth daily on Monday, Wednesday and Friday.  8/2/21   Vivian Cai, APRN - NP   clindamycin (CLEOCIN) 300 MG capsule  7/21/21   Historical Provider, MD   metroNIDAZOLE (FLAGYL) 500 MG tablet  7/21/21 Historical Provider, MD   OXcarbazepine (TRILEPTAL) 150 MG tablet  7/1/21   Historical Provider, MD   predniSONE (DELTASONE) 10 MG tablet  7/22/21   Historical Provider, MD   gabapentin (NEURONTIN) 100 MG capsule  6/25/21   Historical Provider, MD   PARoxetine (PAXIL) 20 MG tablet Take 1 tablet by mouth every morning 6/14/21   RANDI Rae NP   atorvastatin (LIPITOR) 40 MG tablet Take 1 tablet by mouth daily 6/14/21   RANDI Rae NP   SUMAtriptan (IMITREX) 25 MG tablet Take 1 tablet by mouth daily as needed for Migraine  Patient not taking: Reported on 7/28/2021 6/14/21   RANDI Rae NP   EQ PAIN RELIEVER 500 MG tablet  2/16/21   Historical Provider, MD   Incontinence Supply Disposable (INCONTINENCE BRIEF LARGE) [address] each by Does not apply route daily 11/24/20 7/26/21  Jannie Waters MD       Future Appointments   Date Time Provider Ehsan Mack   8/13/2021  2:00 PM [facility] ECHO  [facility]   9/16/2021  1:00 PM Petar Rae

## 2021-08-11 ENCOUNTER — TELEPHONE (OUTPATIENT)
Dept: CARDIOLOGY | Age: 61
End: 2021-08-11

## 2021-08-12 ENCOUNTER — TELEPHONE (OUTPATIENT)
Dept: PRIMARY CARE CLINIC | Age: 61
End: 2021-08-12

## 2021-08-12 NOTE — TELEPHONE ENCOUNTER
pts family called in very upset about the miscommunication for the echo not being scheduled. They are very concerned I did fax the order to [facility] so they can schedule there.  I did inform them that pt could call other offices to see if they can do the echo sooner and if they can to let us know and we can fax the order to whoever can do the echo first.

## 2021-08-16 ENCOUNTER — NURSE ONLY (OUTPATIENT)
Dept: PRIMARY CARE CLINIC | Age: 61
End: 2021-08-16
Payer: COMMERCIAL

## 2021-08-16 ENCOUNTER — ANTI-COAG VISIT (OUTPATIENT)
Dept: FAMILY MEDICINE CLINIC | Age: 61
End: 2021-08-16

## 2021-08-16 DIAGNOSIS — Z95.2 HISTORY OF ARTIFICIAL HEART VALVE: Primary | ICD-10-CM

## 2021-08-16 LAB
INTERNATIONAL NORMALIZATION RATIO, POC: 1
PROTHROMBIN TIME, POC: NORMAL

## 2021-08-16 PROCEDURE — 85610 PROTHROMBIN TIME: CPT | Performed by: NURSE PRACTITIONER

## 2021-08-16 NOTE — RESULT ENCOUNTER NOTE
INR is not therapeutic at this time. Is she taking her medication as prescribed?   Is she avoiding foods high in vitamin K? Ears: no ear pain and no hearing problems.Nose: no nasal congestion and no nasal drainage.Mouth/Throat: no dysphagia, no hoarseness and no throat pain.Neck: no lumps, no pain, no stiffness and no swollen glands.

## 2021-08-19 ENCOUNTER — CARE COORDINATION (OUTPATIENT)
Dept: CARE COORDINATION | Age: 61
End: 2021-08-19

## 2021-08-19 NOTE — CARE COORDINATION
Ambulatory Care Coordination Note  8/19/2021  CM Risk Score: 0  Charlson 10 Year Mortality Risk Score: 10%     ACC: Maggi Pringle RN    Summary Note:   ACM contacted patient's daughter, Sowmya Leonardo (on HIPAA) to follow up on patient's status. ACM did verify with Sowmya Leonardo that her mother is taking 2mg of Coumadin daily. ACM also verified that patient is avoiding foods high in Vitamin K. Sowmya Leonardo was able to name foods patient is to avoid. ACM discuss status of patient's echo. She is schedule to have the echo at [facility] the second week of September. Sowmya Leonardo states PCP office will request records from the hospital to review the results of the echo. Sowmya Leonardo is aware patient has an appointment with Jeff Arizmendi on September 16. PLAN  -Continue Care Coordination  -F/U on INR results, and compliance with taking Coumadin as ordered. Care Coordination Interventions    Program Enrollment: Complex Care  Referral from Primary Care Provider: No  Suggested Interventions and Community Resources  Fall Risk Prevention: Completed  Registered Dietician: Declined  Smoking Cessation: Declined         Goals Addressed                 This Visit's Progress     Conditions and Symptoms   On track     I will schedule office visits, as directed by my provider. I will keep my appointment or reschedule if I have to cancel. I will notify my provider of any symptoms that indicate a worsening of my condition. Barriers: overwhelmed by complexity of regimen  Plan for overcoming my barriers: Care Coordination, Remind patient/family of upcoming appointments. Confidence: 8/10  Anticipated Goal Completion Date: 10/26/21              Prior to Admission medications    Medication Sig Start Date End Date Taking? Authorizing Provider   warfarin (COUMADIN) 1 MG tablet Take 1 tablet by mouth daily on Sunday, Tuesday, Thursday and Saturday. Take 2 tablets by mouth daily on Monday, Wednesday and Friday.  8/2/21   RANDI Whittington NP clindamycin (CLEOCIN) 300 MG capsule  7/21/21   Historical Provider, MD   metroNIDAZOLE (FLAGYL) 500 MG tablet  7/21/21   Historical Provider, MD   OXcarbazepine (TRILEPTAL) 150 MG tablet  7/1/21   Historical Provider, MD   predniSONE (DELTASONE) 10 MG tablet  7/22/21   Historical Provider, MD   gabapentin (NEURONTIN) 100 MG capsule  6/25/21   Historical Provider, MD   PARoxetine (PAXIL) 20 MG tablet Take 1 tablet by mouth every morning 6/14/21   RANDI Del Cid NP   atorvastatin (LIPITOR) 40 MG tablet Take 1 tablet by mouth daily 6/14/21   RANDI Del Cid NP   SUMAtriptan (IMITREX) 25 MG tablet Take 1 tablet by mouth daily as needed for Migraine  Patient not taking: Reported on 7/28/2021 6/14/21   RANDI Del Cid NP   EQ PAIN RELIEVER 500 MG tablet  2/16/21   Historical Provider, MD   Incontinence Supply Disposable (INCONTINENCE BRIEF LARGE) [address] 1 each by Does not apply route daily 11/24/20 7/26/21  Avery Parikh MD       Future Appointments   Date Time Provider Ehsan Mack   9/16/2021  1:00 PM Petar Del Cid

## 2021-08-23 ENCOUNTER — NURSE ONLY (OUTPATIENT)
Dept: PRIMARY CARE CLINIC | Age: 61
End: 2021-08-23
Payer: COMMERCIAL

## 2021-08-23 ENCOUNTER — ANTI-COAG VISIT (OUTPATIENT)
Dept: PRIMARY CARE CLINIC | Age: 61
End: 2021-08-23

## 2021-08-23 DIAGNOSIS — Z95.2 HISTORY OF ARTIFICIAL HEART VALVE: Primary | ICD-10-CM

## 2021-08-23 LAB
INTERNATIONAL NORMALIZATION RATIO, POC: 1.2
PROTHROMBIN TIME, POC: NORMAL

## 2021-08-23 PROCEDURE — 85610 PROTHROMBIN TIME: CPT | Performed by: NURSE PRACTITIONER

## 2021-08-24 ENCOUNTER — TELEPHONE (OUTPATIENT)
Dept: PRIMARY CARE CLINIC | Age: 61
End: 2021-08-24

## 2021-08-24 NOTE — TELEPHONE ENCOUNTER
Patient had ECHO completed in ER, results reviewed. Heart function is normal at 60%. Moderate aortic stenosis and moderate tricuspid regurgitation. She needs to establish with cardiology. I placed a referral at her last appointment.

## 2021-08-24 NOTE — RESULT ENCOUNTER NOTE
INR is not therapeutic at 1.2. Goal is 2.5-3.5. Take 3 mg on Monday, Wednesday, Friday. Continue 2 mg every other day of the week. We will recheck in 1 week.

## 2021-08-24 NOTE — TELEPHONE ENCOUNTER
Frank Evans from [facility] cardiology called and said that you ordered an ECHO on [address] and she just had one done on 07/16/21 in the ED. I see there is a telephone call from the family on 08/12 stating that they were upset that it was not being ordered, but she just had one done.

## 2021-08-26 NOTE — TELEPHONE ENCOUNTER
Patient access at [facility] called for office notes supporting this ECHO. All I have to send is her office visit in June as she has not had another visit with Ashley Ferreira since.

## 2021-08-27 DIAGNOSIS — E78.5 HYPERLIPIDEMIA, UNSPECIFIED HYPERLIPIDEMIA TYPE: ICD-10-CM

## 2021-08-27 RX ORDER — ATORVASTATIN CALCIUM 40 MG/1
40 TABLET, FILM COATED ORAL DAILY
Qty: 30 TABLET | Refills: 3 | Status: SHIPPED
Start: 2021-08-27 | End: 2021-09-16 | Stop reason: SDUPTHER

## 2021-08-27 NOTE — TELEPHONE ENCOUNTER
Last Appointment:  5/12/2021  Future Appointments   Date Time Provider Ehsan Mack   9/16/2021  1:00 PM Petar Lockwood

## 2021-08-30 ENCOUNTER — ANTI-COAG VISIT (OUTPATIENT)
Dept: PRIMARY CARE CLINIC | Age: 61
End: 2021-08-30

## 2021-08-30 ENCOUNTER — NURSE ONLY (OUTPATIENT)
Dept: PRIMARY CARE CLINIC | Age: 61
End: 2021-08-30
Payer: COMMERCIAL

## 2021-08-30 DIAGNOSIS — Z95.2 HISTORY OF ARTIFICIAL HEART VALVE: Primary | ICD-10-CM

## 2021-08-30 PROCEDURE — 85610 PROTHROMBIN TIME: CPT | Performed by: NURSE PRACTITIONER

## 2021-09-02 ENCOUNTER — CARE COORDINATION (OUTPATIENT)
Dept: CARE COORDINATION | Age: 61
End: 2021-09-02

## 2021-09-07 ENCOUNTER — NURSE ONLY (OUTPATIENT)
Dept: PRIMARY CARE CLINIC | Age: 61
End: 2021-09-07
Payer: COMMERCIAL

## 2021-09-07 ENCOUNTER — ANTI-COAG VISIT (OUTPATIENT)
Dept: PRIMARY CARE CLINIC | Age: 61
End: 2021-09-07

## 2021-09-07 DIAGNOSIS — Z95.2 HISTORY OF ARTIFICIAL HEART VALVE: Primary | ICD-10-CM

## 2021-09-07 LAB
INTERNATIONAL NORMALIZATION RATIO, POC: 1.2
PROTHROMBIN TIME, POC: NORMAL

## 2021-09-07 PROCEDURE — 85610 PROTHROMBIN TIME: CPT | Performed by: NURSE PRACTITIONER

## 2021-09-13 ENCOUNTER — ANTI-COAG VISIT (OUTPATIENT)
Dept: PRIMARY CARE CLINIC | Age: 61
End: 2021-09-13

## 2021-09-13 ENCOUNTER — NURSE ONLY (OUTPATIENT)
Dept: PRIMARY CARE CLINIC | Age: 61
End: 2021-09-13
Payer: COMMERCIAL

## 2021-09-13 DIAGNOSIS — Z95.2 HISTORY OF ARTIFICIAL HEART VALVE: Primary | ICD-10-CM

## 2021-09-13 LAB
INTERNATIONAL NORMALIZATION RATIO, POC: 3.3
PROTHROMBIN TIME, POC: NORMAL

## 2021-09-13 PROCEDURE — 85610 PROTHROMBIN TIME: CPT | Performed by: NURSE PRACTITIONER

## 2021-09-14 ENCOUNTER — CARE COORDINATION (OUTPATIENT)
Dept: CARE COORDINATION | Age: 61
End: 2021-09-14

## 2021-09-14 NOTE — CARE COORDINATION
Ambulatory Care Coordination Note  9/14/2021  CM Risk Score: 0  Charlson 10 Year Mortality Risk Score: 10%     ACC: Daniel Aguirre RN    Summary Note:   ACM contacted patient's daughter, Carolyn Clayton (on HIPAA) to follow up on patient's status. Carolyn Clayton states her mother is doing well. We reviewed that patient's INR is currently therapeutic and patient is taking 3mg of Coumadin daily. Carolyn Clayton states she will follow up with the PCP office to determine if the recent ECHO results were sent to Dr. Meenakshi Yusuf office. Carolyn Clayton is aware that her mother does have a follow up appointment with Dr. Kenzie Cedillo (ENT) on 09/21/21. Daughter is also aware that patient does have a virtual visit scheduled with Daisha Ellis on 09/16/21. PLAN  -Continue Care Coordination  -F/U to determine if INR is remaining therapeutic.   -F/U to review status after ENT and PCP appointment. Care Coordination Interventions    Program Enrollment: Complex Care  Referral from Primary Care Provider: No  Suggested Interventions and Community Resources  Fall Risk Prevention: Completed  Registered Dietician: Declined  Smoking Cessation: Declined         Goals Addressed                 This Visit's Progress     Conditions and Symptoms   On track     I will schedule office visits, as directed by my provider. I will keep my appointment or reschedule if I have to cancel. I will notify my provider of any symptoms that indicate a worsening of my condition. Barriers: overwhelmed by complexity of regimen  Plan for overcoming my barriers: Care Coordination, Remind patient/family of upcoming appointments. Confidence: 8/10  Anticipated Goal Completion Date: 10/26/21              Prior to Admission medications    Medication Sig Start Date End Date Taking?  Authorizing Provider   atorvastatin (LIPITOR) 40 MG tablet Take 1 tablet by mouth daily 8/27/21   RANDI Whitmore NP   warfarin (COUMADIN) 1 MG tablet Take 1 tablet by mouth daily on Sunday, Tuesday, Thursday and Saturday. Take 2 tablets by mouth daily on Monday, Wednesday and Friday.  8/2/21   RANDI Zaldivar NP   clindamycin (CLEOCIN) 300 MG capsule  7/21/21   Historical Provider, MD   metroNIDAZOLE (FLAGYL) 500 MG tablet  7/21/21   Historical Provider, MD   OXcarbazepine (TRILEPTAL) 150 MG tablet  7/1/21   Historical Provider, MD   predniSONE (DELTASONE) 10 MG tablet  7/22/21   Historical Provider, MD   gabapentin (NEURONTIN) 100 MG capsule  6/25/21   Historical Provider, MD   PARoxetine (PAXIL) 20 MG tablet Take 1 tablet by mouth every morning 6/14/21   RANDI Zaldivar NP   SUMAtriptan (IMITREX) 25 MG tablet Take 1 tablet by mouth daily as needed for Migraine  Patient not taking: Reported on 7/28/2021 6/14/21   RANDI Zaldivar NP   EQ PAIN RELIEVER 500 MG tablet  2/16/21   Historical Provider, MD   Incontinence Supply Disposable (INCONTINENCE BRIEF LARGE) [address] 1 each by Does not apply route daily 11/24/20 7/26/21  Lenny Burns MD       Future Appointments   Date Time Provider Ehsan Mack   9/16/2021  1:00 PM Petar Zaldivar   9/20/2021 11:00 AM SCHEDULE, [address] [place] [address]

## 2021-09-15 NOTE — PROGRESS NOTES
I cannot advise  [facility] is not a [facility] - I would never have had it done there  They do not even comment on her surgical valve - I cannot even know if it the proper patient  She can get an echo with us, or go see that cardiologist for his opinion

## 2021-09-16 ENCOUNTER — TELEPHONE (OUTPATIENT)
Dept: CARDIOLOGY CLINIC | Age: 61
End: 2021-09-16

## 2021-09-16 ENCOUNTER — VIRTUAL VISIT (OUTPATIENT)
Dept: PRIMARY CARE CLINIC | Age: 61
End: 2021-09-16
Payer: COMMERCIAL

## 2021-09-16 DIAGNOSIS — Z95.2 S/P AVR (AORTIC VALVE REPLACEMENT): Primary | ICD-10-CM

## 2021-09-16 DIAGNOSIS — F34.1 DYSTHYMIA: ICD-10-CM

## 2021-09-16 DIAGNOSIS — E78.5 HYPERLIPIDEMIA, UNSPECIFIED HYPERLIPIDEMIA TYPE: ICD-10-CM

## 2021-09-16 DIAGNOSIS — Z79.01 ANTICOAGULANT LONG-TERM USE: ICD-10-CM

## 2021-09-16 DIAGNOSIS — R51.9 INTRACTABLE HEADACHE, UNSPECIFIED CHRONICITY PATTERN, UNSPECIFIED HEADACHE TYPE: ICD-10-CM

## 2021-09-16 PROCEDURE — 99214 OFFICE O/P EST MOD 30 MIN: CPT | Performed by: NURSE PRACTITIONER

## 2021-09-16 PROCEDURE — G8420 CALC BMI NORM PARAMETERS: HCPCS | Performed by: NURSE PRACTITIONER

## 2021-09-16 PROCEDURE — G8427 DOCREV CUR MEDS BY ELIG CLIN: HCPCS | Performed by: NURSE PRACTITIONER

## 2021-09-16 PROCEDURE — 3017F COLORECTAL CA SCREEN DOC REV: CPT | Performed by: NURSE PRACTITIONER

## 2021-09-16 PROCEDURE — 4004F PT TOBACCO SCREEN RCVD TLK: CPT | Performed by: NURSE PRACTITIONER

## 2021-09-16 RX ORDER — WARFARIN SODIUM 1 MG/1
TABLET ORAL
Qty: 30 TABLET | Refills: 3 | Status: SHIPPED
Start: 2021-09-16 | End: 2021-12-01 | Stop reason: DRUGHIGH

## 2021-09-16 RX ORDER — ATORVASTATIN CALCIUM 40 MG/1
40 TABLET, FILM COATED ORAL DAILY
Qty: 30 TABLET | Refills: 3 | Status: SHIPPED
Start: 2021-09-16 | End: 2022-03-18 | Stop reason: SDUPTHER

## 2021-09-16 RX ORDER — PAROXETINE HYDROCHLORIDE 20 MG/1
20 TABLET, FILM COATED ORAL EVERY MORNING
Qty: 30 TABLET | Refills: 3 | Status: SHIPPED
Start: 2021-09-16 | End: 2021-10-25

## 2021-09-16 RX ORDER — SUMATRIPTAN 25 MG/1
25 TABLET, FILM COATED ORAL DAILY PRN
Qty: 9 TABLET | Refills: 0 | Status: SHIPPED
Start: 2021-09-16 | End: 2022-03-18 | Stop reason: SDUPTHER

## 2021-09-16 ASSESSMENT — ENCOUNTER SYMPTOMS
FACIAL SWELLING: 0
RHINORRHEA: 1
APNEA: 0
SHORTNESS OF BREATH: 0
EYES NEGATIVE: 1
WHEEZING: 0
BACK PAIN: 0
ABDOMINAL PAIN: 0
ABDOMINAL DISTENTION: 0
DIARRHEA: 0
VOICE CHANGE: 0
CONSTIPATION: 0
VOMITING: 0
COLOR CHANGE: 0
NAUSEA: 0
COUGH: 0
CHEST TIGHTNESS: 0

## 2021-09-16 NOTE — TELEPHONE ENCOUNTER
Rene Valenzuela MD  Physician  Specialty:  Cardiology  Progress Notes      Signed  Encounter Date:  9/9/2021    Signed      I cannot advise  [facility] is not a [facility] - I would never have had it done there  They do not even comment on her surgical valve - I cannot even know if it the proper patient  She can get an echo with us, or go see that cardiologist for his opinion

## 2021-09-16 NOTE — PROGRESS NOTES
2021   TELEHEALTH EVALUATION -- Audio/Visual (During IVIUA-81 public health emergency)    Sanjuan Bravo Holter 56 y.o. female    : 1960    TeleMedicine Patient Consent    This visit was performed as a virtual video visit using a synchronous, two-way, audio-video telehealth technology platform. Patient identification was verified at the start of the visit, including the patient's telephone number and physical location. I discussed with the patient the nature of our telehealth visits, that:     1. Due to the nature of an audio- video modality, the only components of a physical exam that could be done are the elements supported by direct observation. 2. I would evaluate the patient and recommend diagnostics and treatments based on my assessment. 3. If it was felt that the patient should be evaluated in clinic or an emergency room setting, then they would be directed there. 4. Our sessions are not being recorded and that personal health information is protected. 5. Our team would provide follow up care in person if/when the patient needs it. Patient does agree to proceed with telemedicine consultation. Patient's location: home address in [facility]    Physician location: home office site  Other people involved in call: none    This visit was completed virtually using Doxy. me        Chief Complaint:   3 Month Follow-Up and Hypothyroidism       History of Present Illness:   Sanjuan Bravo Holter is a 56 y.o. female who has requested an audio/visual telehealth evaluation for 3-month follow-up on her chronic problems. She has a history of an AVR which she is anticoagulated on with warfarin. She has been difficult to make pain in the therapeutic range, goal 2.5-3.5. She is currently taking warfarin 3 mg daily, 5 mg on Monday. She denies any abnormal bruising or bleeding. She is currently following with Dr. Margaret Anthony, cardiology.   Patient is currently following with ENT, Dr. Elida Villeda, she is currently taking Bactrim for chronic sinusitis. She was placed on Nucala, which has improved her symptoms. She is taking all other medications as prescribed without any side effects. The patient is not up-to-date with health maintenance screenings. Previous lab work was reviewed. Past Medical History:     Past Medical History:   Diagnosis Date    Anxiety     Depression     Hyperlipidemia        Past Surgical History:   Procedure Laterality Date    AORTIC VALVE REPLACEMENT  1982    OTHER SURGICAL HISTORY      shunt placement     TONSILLECTOMY         Family History   Family history unknown: Yes       Social History     Tobacco Use    Smoking status: Current Every Day Smoker     Packs/day: 0.25     Years: 40.00     Pack years: 10.00     Types: Cigarettes     Start date: 8/4/1980    Smokeless tobacco: Never Used    Tobacco comment: Smokes 3-5 cigarettes per day   Substance Use Topics    Alcohol use: Not Currently    Drug use: Never       Medications:     Current Outpatient Medications:     warfarin (COUMADIN) 1 MG tablet, Take 3 tablets PO everyday, except Mondays. Take 5 tablets PO on Monday only. , Disp: 30 tablet, Rfl: 3    SUMAtriptan (IMITREX) 25 MG tablet, Take 1 tablet by mouth daily as needed for Migraine, Disp: 9 tablet, Rfl: 0    PARoxetine (PAXIL) 20 MG tablet, Take 1 tablet by mouth every morning, Disp: 30 tablet, Rfl: 3    atorvastatin (LIPITOR) 40 MG tablet, Take 1 tablet by mouth daily, Disp: 30 tablet, Rfl: 3    MEPOLIZUMAB SC, Inject into the skin, Disp: , Rfl:     EQ PAIN RELIEVER 500 MG tablet, , Disp: , Rfl:     Incontinence Supply Disposable (INCONTINENCE BRIEF LARGE) MISC, 1 each by Does not apply route daily, Disp: 2 Package, Rfl: 3    Allergies   Allergen Reactions    Pcn [Penicillins]     Doxycycline Hyclate Nausea And Vomiting       Review of Systems:   Review of Systems   Constitutional: Negative for activity change, appetite change, fatigue, fever and unexpected weight change. HENT: Positive for congestion and rhinorrhea. Negative for facial swelling, mouth sores, postnasal drip, sneezing, tinnitus and voice change. Eyes: Negative. Respiratory: Negative for apnea, cough, chest tightness, shortness of breath and wheezing. Cardiovascular: Negative for chest pain, palpitations and leg swelling. Gastrointestinal: Negative for abdominal distention, abdominal pain, constipation, diarrhea, nausea and vomiting. Endocrine: Negative for cold intolerance and heat intolerance. Genitourinary: Negative for difficulty urinating, dysuria, flank pain, frequency, pelvic pain and urgency. Musculoskeletal: Negative for back pain, gait problem, joint swelling, myalgias and neck pain. Skin: Negative for color change, pallor, rash and wound. Allergic/Immunologic: Negative for environmental allergies and food allergies. Neurological: Negative for dizziness, tremors, seizures, syncope, facial asymmetry, speech difficulty, weakness, light-headedness, numbness and headaches. Psychiatric/Behavioral: Negative for agitation, behavioral problems, confusion, decreased concentration, dysphoric mood, sleep disturbance and suicidal ideas. The patient is not hyperactive. Physical Exam:   PHYSICAL EXAMINATION:  [ INSTRUCTIONS:  \"[x]\" Indicates a positive item  \"[]\" Indicates a negative item  -- DELETE ALL ITEMS NOT EXAMINED]  Vital Signs: (As obtained by patient/caregiver or practitioner observation)    Blood pressure-  Heart rate-    Respiratory rate-    Temperature-  Pulse oximetry-     Constitutional: [x] Appears well-developed and well-nourished [x] No apparent distress      [] Abnormal-   Mental status  [x] Alert and awake  [x] Oriented to person/place/time [x]Able to follow commands      Eyes:  EOM    [x]  Normal  [] Abnormal-  Sclera  [x]  Normal  [] Abnormal -         Discharge [x]  None visible  [] Abnormal -    HENT:   [x] Normocephalic, atraumatic.   [] Abnormal   [x] Mouth/Throat: Mucous membranes are moist.     External Ears [x] Normal  [] Abnormal-     Neck: [x] No visualized mass     Pulmonary/Chest: [x] Respiratory effort normal.  [x] No visualized signs of difficulty breathing or respiratory distress        [] Abnormal-      Musculoskeletal:   [x] Normal gait with no signs of ataxia         [x] Normal range of motion of neck        [] Abnormal-       Neurological:        [x] No Facial Asymmetry (Cranial nerve 7 motor function) (limited exam to video visit)          [x] No gaze palsy        [] Abnormal-         Skin:        [x] No significant exanthematous lesions or discoloration noted on facial skin         [] Abnormal-            Psychiatric:       [x] Normal Affect [x] No Hallucinations        [] Abnormal-     Other pertinent observable physical exam findings-     Health Maintenance:     Health Maintenance   Topic Date Due    Pneumococcal 0-64 years Vaccine (1 of 2 - PPSV23) Never done    DTaP/Tdap/Td vaccine (1 - Tdap) Never done    Cervical cancer screen  Never done    Breast cancer screen  Never done    Shingles Vaccine (2 of 2) 01/19/2021    Flu vaccine (1) 09/01/2021    Colon Cancer Screen FIT/FOBT  11/20/2021    Lipid screen  06/15/2022    COVID-19 Vaccine  Completed    Hepatitis C screen  Completed    HIV screen  Completed    Hepatitis A vaccine  Aged Out    Hepatitis B vaccine  Aged Out    Hib vaccine  Aged Out    Meningococcal (ACWY) vaccine  Aged [employer] History   Administered Date(s) Administered    COVID-19, Pfizer, PF, 30mcg/0.3mL 03/10/2021, 04/01/2021    Influenza, Quadv, IM, PF (6 mo and older Fluzone, Flulaval, Fluarix, and 3 yrs and older Afluria) 11/24/2020        Testing: All laboratory and radiology results have been personally reviewed by myself. Labs:  No results found for this visit on 09/16/21. Imaging: All Radiology results interpreted by Radiologist unless otherwise noted. No results found. Assessment/Plan:    I personally reviewed the patient's allergies, past medical history, medications, and vitals sign. Nahum Camacho was seen today for 3 month follow-up and hypothyroidism. Diagnoses and all orders for this visit:    S/P AVR (aortic valve replacement)  -     warfarin (COUMADIN) 1 MG tablet; Take 3 tablets PO everyday, except Mondays. Take 5 tablets PO on Monday only. -     CBC WITH AUTO DIFFERENTIAL; Future  -     COMPREHENSIVE METABOLIC PANEL; Future    Anticoagulant long-term use  -     warfarin (COUMADIN) 1 MG tablet; Take 3 tablets PO everyday, except Mondays. Take 5 tablets PO on Monday only. Hyperlipidemia, unspecified hyperlipidemia type  -     atorvastatin (LIPITOR) 40 MG tablet; Take 1 tablet by mouth daily  -     LIPID PANEL; Future    Intractable headache, unspecified chronicity pattern, unspecified headache type  -     SUMAtriptan (IMITREX) 25 MG tablet; Take 1 tablet by mouth daily as needed for Migraine    Dysthymia  -     PARoxetine (PAXIL) 20 MG tablet; Take 1 tablet by mouth every morning        Olga Z Holter, was evaluated through a synchronous (real-time) audio-video encounter. The patient (or guardian if applicable) is aware that this is a billable service. Verbal consent to proceed has been obtained within the past 12 months. The visit was conducted pursuant to the emergency declaration under the [address] [place] [address], [address] [place] [address] authority and the [employer] General Act. Patient identification was verified, and a caregiver was present when appropriate. The patient was located in a state where the provider was credentialed to provide care. Continue all medications as prescribed, side effects discussed. Will recheck INR on Monday as planned. Will obtain fasting lab work, will call with results. Patient is to keep all follow-up appointments as discussed.     Call or go to ED immediately if symptoms worsen or persist.  Return in about 3 months (around 12/16/2021) for 3 mth f/u. Sooner if necessary. At that time will further discuss health maintenance screenings. Counseled regarding above diagnosis, including possible risks and complications,especially if left uncontrolled. Counseled regarding the possible side effects, risks, benefits and alternatives to treatment; patient and/or guardian verbalizes understanding. Advised patient to call with any new medication issues. All questions answered. Reviewed age and gender appropriate health screening exams and vaccinations. Advised patient regarding importance of keeping up with recommended health maintenance and to schedule as soon as possible if overdue, as this is important in assessing for undiagnosed pathology, especially cancer. Patient verbalizes understanding and agrees. Total time spent on this encounter: 30 minutes    --RANDI Pitts NP on 9/16/2021 at 1:43 PM    An electronic signature was used to authenticate this note. **This report was transcribed using voice recognition software. Every effort was made to ensure accuracy; however, inadvertent computerized transcription errors may be present.

## 2021-09-16 NOTE — TELEPHONE ENCOUNTER
----- Message from Aleja Perez MD sent at 9/15/2021  4:12 PM EDT -----      ----- Message -----  From: Ning Gloria  Sent: 9/15/2021   2:30 PM EDT  To: Aleja Perez MD    Please advise on echo results

## 2021-09-20 ENCOUNTER — ANTI-COAG VISIT (OUTPATIENT)
Dept: PRIMARY CARE CLINIC | Age: 61
End: 2021-09-20

## 2021-09-20 ENCOUNTER — NURSE ONLY (OUTPATIENT)
Dept: PRIMARY CARE CLINIC | Age: 61
End: 2021-09-20
Payer: COMMERCIAL

## 2021-09-20 DIAGNOSIS — Z79.01 ANTICOAGULANT LONG-TERM USE: Primary | ICD-10-CM

## 2021-09-20 LAB
INTERNATIONAL NORMALIZATION RATIO, POC: 2.2
PROTHROMBIN TIME, POC: NORMAL

## 2021-09-20 PROCEDURE — 85610 PROTHROMBIN TIME: CPT | Performed by: NURSE PRACTITIONER

## 2021-09-27 ENCOUNTER — NURSE ONLY (OUTPATIENT)
Dept: PRIMARY CARE CLINIC | Age: 61
End: 2021-09-27
Payer: COMMERCIAL

## 2021-09-27 DIAGNOSIS — Z79.01 ANTICOAGULANT LONG-TERM USE: Primary | ICD-10-CM

## 2021-09-27 LAB
INTERNATIONAL NORMALIZATION RATIO, POC: 1.5
PROTHROMBIN TIME, POC: 17.9

## 2021-09-27 PROCEDURE — 85610 PROTHROMBIN TIME: CPT | Performed by: NURSE PRACTITIONER

## 2021-09-27 NOTE — PROGRESS NOTES
9/27/21 - patient states she is taking 4mg MWF 3mg rest of the week. However last INR she was taking 3mg qd. Not sure of dose. No bleeding or missed doses.

## 2021-09-28 ENCOUNTER — CARE COORDINATION (OUTPATIENT)
Dept: CARE COORDINATION | Age: 61
End: 2021-09-28

## 2021-09-28 NOTE — CARE COORDINATION
Ambulatory Care Coordination Note  9/28/2021  CM Risk Score: 0  Charlson 10 Year Mortality Risk Score: 10%     ACC: Daniel Aguirre RN    Summary Note:   ACM contacted patient's daughter, Carolyn Clayton (on HIPAA) to follow up on patient's status. Carolyn Clayton states her mother is doing well. ACM reviewed ordered dose of Coumadin and verified that patient is not missing any doses. Carolyn Clayton is aware lab work is to be performed on 10/05/21. Carolyn Clayton states she is waiting to hear from Dr. Michelle Bautista office regarding prior auth for Echo. Patient did have an Echo performed at [facility], however, Dr. Umu Garland stated they did not comment on the surgical valve and he was not sure if he even had the report for the correct patient. PLAN  -Continue Care Coordination  -F/U on stability of INR  -F/U to determine if prior auth was approved for Echo requested by Dr. Umu Garland. Care Coordination Interventions    Program Enrollment: Complex Care  Referral from Primary Care Provider: No  Suggested Interventions and Community Resources  Fall Risk Prevention: Completed  Registered Dietician: Declined  Smoking Cessation: Declined         Goals Addressed                 This Visit's Progress     Conditions and Symptoms   On track     I will schedule office visits, as directed by my provider. I will keep my appointment or reschedule if I have to cancel. I will notify my provider of any symptoms that indicate a worsening of my condition. Barriers: overwhelmed by complexity of regimen  Plan for overcoming my barriers: Care Coordination, Remind patient/family of upcoming appointments. Confidence: 8/10  Anticipated Goal Completion Date: 10/26/21              Prior to Admission medications    Medication Sig Start Date End Date Taking? Authorizing Provider   warfarin (COUMADIN) 1 MG tablet Take 3 tablets PO everyday, except Mondays. Take 5 tablets PO on Monday only.  9/16/21   Daisha Minors, APRN - NP   SUMAtriptan (IMITREX) 25 MG tablet Take 1 tablet by mouth daily as needed for Migraine 9/16/21   Conner Ramos APRN - NP   PARoxetine (PAXIL) 20 MG tablet Take 1 tablet by mouth every morning 9/16/21   RANDI Sy - NP   atorvastatin (LIPITOR) 40 MG tablet Take 1 tablet by mouth daily 9/16/21   RANDI Sy - NP   MEPOLIZUMAB SC Inject into the skin    Historical Provider, MD PATIÑO PAIN RELIEVER 500 MG tablet  2/16/21   Historical Provider, MD   Incontinence Supply Disposable (INCONTINENCE BRIEF LARGE) [address] 1 each by Does not apply route daily 11/24/20 7/26/21  Liz MandaeismMD carmen       Future Appointments   Date Time Provider Ehsan Mack   12/16/2021  1:00 PM Petar Sy

## 2021-10-08 ENCOUNTER — NURSE ONLY (OUTPATIENT)
Dept: PRIMARY CARE CLINIC | Age: 61
End: 2021-10-08
Payer: COMMERCIAL

## 2021-10-08 DIAGNOSIS — Z79.01 ANTICOAGULANT LONG-TERM USE: ICD-10-CM

## 2021-10-08 LAB — INTERNATIONAL NORMALIZATION RATIO, POC: 2.8

## 2021-10-08 PROCEDURE — 85610 PROTHROMBIN TIME: CPT | Performed by: NURSE PRACTITIONER

## 2021-10-11 ENCOUNTER — TELEPHONE (OUTPATIENT)
Dept: CARDIOLOGY CLINIC | Age: 61
End: 2021-10-11

## 2021-10-11 NOTE — TELEPHONE ENCOUNTER
Tasneem Aden. This is Gricelda. We are still waiting for the scheduling of the echo at Dr. Rimma Victor office. I know you were getting preauthorization from the insurance company but not sure where we are now. Can you please check on this and call me?  Thank you, Sonu Ragland

## 2021-10-14 ENCOUNTER — CARE COORDINATION (OUTPATIENT)
Dept: CARE COORDINATION | Age: 61
End: 2021-10-14

## 2021-10-14 NOTE — CARE COORDINATION
AC left a voice message for Hernan Campbell (Patient's daughter, on HIPAA) informing her that this ACM did leave a message with cardiology office asking for an update on status of scheduling ECHO. ACM left contact information and requested a return call. PLAN  -If no return call, continue to attempt to contact patient.

## 2021-10-18 ENCOUNTER — NURSE ONLY (OUTPATIENT)
Dept: PRIMARY CARE CLINIC | Age: 61
End: 2021-10-18
Payer: COMMERCIAL

## 2021-10-18 ENCOUNTER — ANTI-COAG VISIT (OUTPATIENT)
Dept: PRIMARY CARE CLINIC | Age: 61
End: 2021-10-18

## 2021-10-18 DIAGNOSIS — Z95.2 S/P AVR (AORTIC VALVE REPLACEMENT): Primary | ICD-10-CM

## 2021-10-18 LAB
INTERNATIONAL NORMALIZATION RATIO, POC: 2.1
PROTHROMBIN TIME, POC: NORMAL

## 2021-10-18 PROCEDURE — 85610 PROTHROMBIN TIME: CPT | Performed by: NURSE PRACTITIONER

## 2021-10-21 ENCOUNTER — CARE COORDINATION (OUTPATIENT)
Dept: CARE COORDINATION | Age: 61
End: 2021-10-21

## 2021-10-21 NOTE — CARE COORDINATION
Voice message left on patient's daughter's phone (Abelardo Rivera, on HIPAA) with contact information requesting a return call so that ACM can follow up on patient's status. PLAN  -If no return call, send trying to reach letter per My Chart and continue to attempt to contact patient.

## 2021-10-21 NOTE — LETTER
October 22, 2021    Cathleen Escobedo Holter  [phone] [facility]      Dear Rita:/ Abhishek Hooper    I have been unable to reach you by phone for our care coordination follow up telephone call. I have missed our conversations and am interested in your health. Please call me at [phone], so that we can continue to work together to improve your health. If you have any questions or concerns, please don't hesitate to call.     Sincerely,        Omar Christian RN

## 2021-10-29 ENCOUNTER — CARE COORDINATION (OUTPATIENT)
Dept: CARE COORDINATION | Age: 61
End: 2021-10-29

## 2021-10-29 NOTE — CARE COORDINATION
ACM left a voice message with contact information for [facility] asking for a return call to this ACM or patient's daughter, Nerissa Pascual as to the status of scheduling ECHO. PLAN  -if no return call, continue to attempt to contact.

## 2021-10-29 NOTE — CARE COORDINATION
Ambulatory Care Coordination Note  10/29/2021  CM Risk Score: 0  Charlson 10 Year Mortality Risk Score: 10%     ACC: Josias Randall RN    Summary Note:   [facility] contacted patient's daughter, Edward Kaufman (on HIPAA) to review patient's status. Edward Kaufman states her mother is doing well. She is taking Coumadin as ordered and is aware she has an INR check on Monday, 11/01/21. Edward Kaufman states she has not heard from [facility] regarding scheduling of ECHO. [facility] did inform [facility] that [facility] has also left two voice messages at the office asking on the status of scheduling ECHO. [facility] suggested [facility] send another My Chart message and/or call the office to try to speak to someone directly. PLAN  -Continue Care Coordination  -F/U to determine status of scheduling ordered ECHO (per documentation, ECHO was ordered on 09/16/21). Care Coordination Interventions    Program Enrollment: Complex Care  Referral from Primary Care Provider: No  Suggested Interventions and Community Resources  Fall Risk Prevention: Completed  Registered Dietician: Declined  Smoking Cessation: Declined         Goals Addressed                 This Visit's Progress     Conditions and Symptoms   On track     I will schedule office visits, as directed by my provider. I will keep my appointment or reschedule if I have to cancel. I will notify my provider of any symptoms that indicate a worsening of my condition. Barriers: overwhelmed by complexity of regimen  Plan for overcoming my barriers: Care Coordination, Remind patient/family of upcoming appointments. Confidence: 8/10  Anticipated Goal Completion Date: 10/26/21              Prior to Admission medications    Medication Sig Start Date End Date Taking? Authorizing Provider   PARoxetine (PAXIL) 20 MG tablet TAKE 1 TABLET BY MOUTH ONCE DAILY IN THE MORNING 10/25/21   RANDI Hernandez NP   warfarin (COUMADIN) 1 MG tablet Take 3 tablets PO everyday, except Mondays.  Take 5 tablets PO on Monday only. 9/16/21   Elizabeth Mane APRN - NP   SUMAtriptan (IMITREX) 25 MG tablet Take 1 tablet by mouth daily as needed for Migraine 9/16/21   Elizabeth Mane APRN - NP   atorvastatin (LIPITOR) 40 MG tablet Take 1 tablet by mouth daily 9/16/21   Elizabeth Mane APRN - NP   MEPOLIZUMAB SC Inject into the skin    Historical Provider, MD PATIÑO PAIN RELIEVER 500 MG tablet  2/16/21   Historical Provider, MD   Incontinence Supply Disposable (INCONTINENCE BRIEF LARGE) [address] 1 each by Does not apply route daily 11/24/20 7/26/21  Iveth Brown MD       Future Appointments   Date Time Provider Ehsan Mack   12/16/2021  1:00 PM Petar Parson

## 2021-11-01 ENCOUNTER — ANTI-COAG VISIT (OUTPATIENT)
Dept: PRIMARY CARE CLINIC | Age: 61
End: 2021-11-01

## 2021-11-01 ENCOUNTER — OFFICE VISIT (OUTPATIENT)
Dept: FAMILY MEDICINE CLINIC | Age: 61
End: 2021-11-01
Payer: COMMERCIAL

## 2021-11-01 ENCOUNTER — NURSE ONLY (OUTPATIENT)
Dept: PRIMARY CARE CLINIC | Age: 61
End: 2021-11-01
Payer: COMMERCIAL

## 2021-11-01 ENCOUNTER — CARE COORDINATION (OUTPATIENT)
Dept: CARE COORDINATION | Age: 61
End: 2021-11-01

## 2021-11-01 VITALS
HEART RATE: 72 BPM | RESPIRATION RATE: 16 BRPM | DIASTOLIC BLOOD PRESSURE: 60 MMHG | SYSTOLIC BLOOD PRESSURE: 105 MMHG | OXYGEN SATURATION: 98 % | TEMPERATURE: 97.7 F | WEIGHT: 149 LBS | BODY MASS INDEX: 24.05 KG/M2

## 2021-11-01 DIAGNOSIS — Z95.2 S/P AVR (AORTIC VALVE REPLACEMENT): Primary | ICD-10-CM

## 2021-11-01 DIAGNOSIS — J01.00 ACUTE NON-RECURRENT MAXILLARY SINUSITIS: Primary | ICD-10-CM

## 2021-11-01 DIAGNOSIS — H53.9 VISION CHANGES: ICD-10-CM

## 2021-11-01 DIAGNOSIS — R51.9 ACUTE NONINTRACTABLE HEADACHE, UNSPECIFIED HEADACHE TYPE: ICD-10-CM

## 2021-11-01 DIAGNOSIS — R42 DIZZINESS: ICD-10-CM

## 2021-11-01 DIAGNOSIS — Z98.2 S/P VP SHUNT: ICD-10-CM

## 2021-11-01 LAB
INTERNATIONAL NORMALIZATION RATIO, POC: 1.8
PROTHROMBIN TIME, POC: NORMAL

## 2021-11-01 PROCEDURE — 4004F PT TOBACCO SCREEN RCVD TLK: CPT | Performed by: NURSE PRACTITIONER

## 2021-11-01 PROCEDURE — G8427 DOCREV CUR MEDS BY ELIG CLIN: HCPCS | Performed by: NURSE PRACTITIONER

## 2021-11-01 PROCEDURE — 99214 OFFICE O/P EST MOD 30 MIN: CPT | Performed by: NURSE PRACTITIONER

## 2021-11-01 PROCEDURE — G8420 CALC BMI NORM PARAMETERS: HCPCS | Performed by: NURSE PRACTITIONER

## 2021-11-01 PROCEDURE — 85610 PROTHROMBIN TIME: CPT | Performed by: NURSE PRACTITIONER

## 2021-11-01 PROCEDURE — G8484 FLU IMMUNIZE NO ADMIN: HCPCS | Performed by: NURSE PRACTITIONER

## 2021-11-01 PROCEDURE — 3017F COLORECTAL CA SCREEN DOC REV: CPT | Performed by: NURSE PRACTITIONER

## 2021-11-01 RX ORDER — CETIRIZINE HYDROCHLORIDE 10 MG/1
10 TABLET ORAL DAILY
Qty: 30 TABLET | Refills: 0 | Status: SHIPPED
Start: 2021-11-01 | End: 2022-03-18 | Stop reason: SDUPTHER

## 2021-11-01 RX ORDER — AZITHROMYCIN 250 MG/1
250 TABLET, FILM COATED ORAL DAILY
Qty: 6 TABLET | Refills: 0 | Status: SHIPPED
Start: 2021-11-01 | End: 2021-12-01 | Stop reason: DRUGHIGH

## 2021-11-01 NOTE — PROGRESS NOTES
21  Lauretta Moon Holter : 1960 Sex: female  Age 56 y.o. Subjective:  Chief Complaint   Patient presents with    Dizziness     x 2 days        HPI:   Lauretta Moon Holter , 56 y.o. female presents to the clinic for evaluation of intermittent dizziness x 2 days. The patient also reports blurry vision and headache. The patient also reports sinus congestion x 2 days. The patient reports symptoms worsen with changing positions or moving head. The patient denies head injury and reports history of dizziness. The patient has not taken any treatment for symptoms. The patient reports unchanged symptoms over time. The patient denies heart palpitations, syncope, hearing changes, and extremity weakness or loss of sensation. The patient also denies fever, chest pain, abdominal pain, shortness of breath, and nausea / vomiting / diarrhea. ROS:   Unless otherwise stated in this report the patient's positive and negative responses for review of systems for constitutional, eyes, ENT, cardiovascular, respiratory, gastrointestinal, neurological, , musculoskeletal, and integument systems and related systems to the presenting problem are either stated in the history of present illness or were not pertinent or were negative for the symptoms and/or complaints related to the presenting medical problem. Positives and pertinent negatives as per HPI. All others reviewed and are negative.       PMH:     Past Medical History:   Diagnosis Date    Anxiety     Depression     Hyperlipidemia        Past Surgical History:   Procedure Laterality Date    AORTIC VALVE REPLACEMENT  1982    OTHER SURGICAL HISTORY      shunt placement     TONSILLECTOMY         Family History   Family history unknown: Yes       Medications:     Current Outpatient Medications:     cetirizine (ZYRTEC ALLERGY) 10 MG tablet, Take 1 tablet by mouth daily, Disp: 30 tablet, Rfl: 0    azithromycin (ZITHROMAX Z-TAWANDA) 250 MG tablet, Take 1 tablet by mouth daily murmurs or gallops. Pulmonary: Respiratory effort normal.  Normal breath sounds. Abdomen: Soft, nontender, normal bowel sounds. Back:  No costovertebral tenderness. Skin:  Normal turgor. Warm, dry, without visible rash, unless noted elsewhere. Neurological:  Oriented. Motor functions intact. CN II-XII intact. No nystagmus noted. Ambulates without ataxia. UE/LE/ strength 5/5 bilaterally. [place]-Hallpike Test: Negative  Psychiatric: Mood and Affect: Mood normal. Behavior: Behavior normal.    Testing:   (All laboratory and radiology results have been personally reviewed by myself)  Labs:  No results found for this visit on 11/01/21. Imaging: All Radiology results interpreted by Radiologist unless otherwise noted. No orders to display       Assessment / Plan:   The patient's vitals, allergies, medications, and past medical history have been reviewed. Wong Galaviz was seen today for dizziness. Diagnoses and all orders for this visit:    Acute non-recurrent maxillary sinusitis  -     cetirizine (ZYRTEC ALLERGY) 10 MG tablet; Take 1 tablet by mouth daily  -     azithromycin (ZITHROMAX Z-TAWANDA) 250 MG tablet; Take 1 tablet by mouth daily Take 2 tabs on day one, then 1 tab daily for the next 4 days    Dizziness    Acute nonintractable headache, unspecified headache type    Vision changes        - Disposition: ED for further evaluation of persistent headache, vision changes, dizziness, and hx of  shunt.    - Educational material printed for patient's review and were included in patient instructions. After Visit Summary and given to patient at the end of visit. - Vitals reviewed which are stable. Neuro exam is benign.     - Pt advised that a comprehensive workup is unable to be performed in an ready care setting. Pt advised to go straight to the ED for further evaluation and management. Pt agreed with this care plan and agreed to go immediately by private vehicle. Pt left our office in stable condition. Further disposition to follow. All questions answered. SIGNATURE: RANDI Mcneal-ELLIOT    *NOTE: This report was transcribed using voice recognition software. Every effort was made to ensure accuracy; however, inadvertent computerized transcription errors may be present.

## 2021-11-01 NOTE — PATIENT INSTRUCTIONS
Patient Education        Dizziness: Care Instructions  Your Care Instructions  Dizziness is the feeling of unsteadiness or fuzziness in your head. It is different than having vertigo, which is a feeling that the room is spinning or that you are moving or falling. It is also different from lightheadedness, which is the feeling that you are about to faint. It can be hard to know what causes dizziness. Some people feel dizzy when they have migraine headaches. Sometimes bouts of flu can make you feel dizzy. Some medical conditions, such as heart problems or high blood pressure, can make you feel dizzy. Many medicines can cause dizziness, including medicines for high blood pressure, pain, or anxiety. If a medicine causes your symptoms, your doctor may recommend that you stop or change the medicine. If it is a problem with your heart, you may need medicine to help your heart work better. If there is no clear reason for your symptoms, your doctor may suggest watching and waiting for a while to see if the dizziness goes away on its own. Follow-up care is a key part of your treatment and safety. Be sure to make and go to all appointments, and call your doctor if you are having problems. It's also a good idea to know your test results and keep a list of the medicines you take. How can you care for yourself at home? · If your doctor recommends or prescribes medicine, take it exactly as directed. Call your doctor if you think you are having a problem with your medicine. · Do not drive while you feel dizzy. · Try to prevent falls. Steps you can take include:  ? Using nonskid mats, adding grab bars near the tub, and using night-lights. ? Clearing your home so that walkways are free of anything you might trip on.  ? Letting family and friends know that you have been feeling dizzy. This will help them know how to help you. When should you call for help? Call 911 anytime you think you may need emergency care.  For example, call if:    · You passed out (lost consciousness).     · You have dizziness along with symptoms of a heart attack. These may include:  ? Chest pain or pressure, or a strange feeling in the chest.  ? Sweating. ? Shortness of breath. ? Nausea or vomiting. ? Pain, pressure, or a strange feeling in the back, neck, jaw, or upper belly or in one or both shoulders or arms. ? Lightheadedness or sudden weakness. ? A fast or irregular heartbeat.     · You have symptoms of a stroke. These may include:  ? Sudden numbness, tingling, weakness, or loss of movement in your face, arm, or leg, especially on only one side of your body. ? Sudden vision changes. ? Sudden trouble speaking. ? Sudden confusion or trouble understanding simple statements. ? Sudden problems with walking or balance. ? A sudden, severe headache that is different from past headaches. Call your doctor now or seek immediate medical care if:    · You feel dizzy and have a fever, headache, or ringing in your ears.     · You have new or increased nausea and vomiting.     · Your dizziness does not go away or comes back. Watch closely for changes in your health, and be sure to contact your doctor if:    · You do not get better as expected. Where can you learn more? Go to https://[employer].GlucoSentient. org and sign in to your Offerpop account. Enter B074 in the [facility] box to learn more about \"Dizziness: Care Instructions. \"     If you do not have an account, please click on the \"Sign Up Now\" link. Current as of: July 1, 2021               Content Version: 13.0  © 2006-2021 Healthwise, Incorporated. Care instructions adapted under license by [facility] If you have questions about a medical condition or this instruction, always ask your healthcare professional. [place] [address] any warranty or liability for your use of this information.          Patient Education        Sinusitis: Care Instructions  Your Care Instructions     Sinusitis is an infection of the lining of the sinus cavities in your head. Sinusitis often follows a cold. It causes pain and pressure in your head and face. In most cases, sinusitis gets better on its own in 1 to 2 weeks. But some mild symptoms may last for several weeks. Sometimes antibiotics are needed. Follow-up care is a key part of your treatment and safety. Be sure to make and go to all appointments, and call your doctor if you are having problems. It's also a good idea to know your test results and keep a list of the medicines you take. How can you care for yourself at home? · Take an over-the-counter pain medicine, such as acetaminophen (Tylenol), ibuprofen (Advil, Motrin), or naproxen (Aleve). Read and follow all instructions on the label. · If the doctor prescribed antibiotics, take them as directed. Do not stop taking them just because you feel better. You need to take the full course of antibiotics. · Be careful when taking over-the-counter cold or flu medicines and Tylenol at the same time. Many of these medicines have acetaminophen, which is Tylenol. Read the labels to make sure that you are not taking more than the recommended dose. Too much acetaminophen (Tylenol) can be harmful. · Breathe warm, moist air from a steamy shower, a hot bath, or a sink filled with hot water. Avoid cold, dry air. Using a humidifier in your home may help. Follow the directions for cleaning the machine. · Use saline (saltwater) nasal washes. This can help keep your nasal passages open and wash out mucus and bacteria. You can buy saline nose drops at a grocery store or drugstore. Or you can make your own at home by adding 1 teaspoon of salt and 1 teaspoon of baking soda to 2 cups of distilled water. If you make your own, fill a bulb syringe with the solution, insert the tip into your nostril, and squeeze gently. Asya Doyne your nose.   · Put a hot, wet towel or a warm gel pack on your face 3 or 4 times a day for 5 to 10 minutes each time. · Try a decongestant nasal spray like oxymetazoline (Afrin). Do not use it for more than 3 days in a row. Using it for more than 3 days can make your congestion worse. When should you call for help? Call your doctor now or seek immediate medical care if:    · You have new or worse swelling or redness in your face or around your eyes.     · You have a new or higher fever. Watch closely for changes in your health, and be sure to contact your doctor if:    · You have new or worse facial pain.     · The mucus from your nose becomes thicker (like pus) or has new blood in it.     · You are not getting better as expected. Where can you learn more? Go to https://[employer].Therasis. org and sign in to your Sharalike account. Enter T347 in the [employer] box to learn more about \"Sinusitis: Care Instructions. \"     If you do not have an account, please click on the \"Sign Up Now\" link. Current as of: December 2, 2020               Content Version: 13.0  © 0160-0362 Healthwise, Incorporated. Care instructions adapted under license by [facility] ([facility]). If you have questions about a medical condition or this instruction, always ask your healthcare professional. [address] any warranty or liability for your use of this information.

## 2021-11-01 NOTE — CARE COORDINATION
ACM received notification from [facility] stating patient's ECHO is scheduled for 12/03/21 (first available).      PLAN  -Continue Care Coordination

## 2021-11-03 DIAGNOSIS — G91.2 NPH (NORMAL PRESSURE HYDROCEPHALUS) (HCC): ICD-10-CM

## 2021-11-16 ENCOUNTER — CARE COORDINATION (OUTPATIENT)
Dept: CARE COORDINATION | Age: 61
End: 2021-11-16

## 2021-11-16 NOTE — CARE COORDINATION
Ambulatory Care Coordination Note  11/16/2021  CM Risk Score: 0  Charlson 10 Year Mortality Risk Score: 10%     ACC: Didi Zavala RN    Summary Note:   ACM contacted patient's daughter, Jamel Gibbons (on HIPAA) to follow up on patient's status. Jamel Gibbons states her mother is doing well. Jamel Gibbons stated patient did complete appointment with ENT on 11/09/21. Jamel Gibbons is waiting for Rhinology to contact her to discuss further treatment plan. ACM reviewed patient's Coumadin dosage. Gricelda verified patient is taking 5 mg on M, W, F and 4 mg all other days. ACM reviewed patient did miss her INR check on 11/08/21. Jamel Gibbons stated she was out of town on that date. She states she will remind her mother to go to PCP office for an INR check. ACM reviewed upcoming scheduled appointments with Gricelda. She states her mother will attend all appointments as scheduled, including echo scheduled on 12/03/21. Jamel Gibbons feels she is able to manage her mother's helathcare needs without further assistance from this ACM and feels ready to graduate patient from Care Coordination. Daughter does have this ACM's contact information and will call if she feels she requires additional help managing her mother's needs in the future. PLAN  -Inform PCP patient's daughter feels she is able to manage her mother's healthcare needs and is ready to graduate from Care Coordination. Care Coordination Interventions    Program Enrollment: Complex Care  Referral from Primary Care Provider: No  Suggested Interventions and Community Resources  Fall Risk Prevention: Completed  Registered Dietician: Declined  Smoking Cessation: Declined         Goals Addressed                 This Visit's Progress     COMPLETED: Conditions and Symptoms        I will schedule office visits, as directed by my provider. I will keep my appointment or reschedule if I have to cancel. I will notify my provider of any symptoms that indicate a worsening of my condition.     Barriers: overwhelmed by complexity of regimen  Plan for overcoming my barriers: Care Coordination, Remind patient/family of upcoming appointments. Confidence: 8/10  Anticipated Goal Completion Date: 10/26/21, 11/16/21              Prior to Admission medications    Medication Sig Start Date End Date Taking? Authorizing Provider   cetirizine (ZYRTEC ALLERGY) 10 MG tablet Take 1 tablet by mouth daily 11/1/21   Georgia Manley., APRN - CNP   azithromycin (ZITHROMAX Z-TAWANDA) 250 MG tablet Take 1 tablet by mouth daily Take 2 tabs on day one, then 1 tab daily for the next 4 days 11/1/21   Georgia Manley., APRN - CNP   PARoxetine (PAXIL) 20 MG tablet TAKE 1 TABLET BY MOUTH ONCE DAILY IN THE MORNING 10/25/21   Elizabeth Mane APRN - CORTEZ   warfarin (COUMADIN) 1 MG tablet Take 3 tablets PO everyday, except Mondays. Take 5 tablets PO on Monday only.  9/16/21   Elizabeth Mane APRN - NP   SUMAtriptan (IMITREX) 25 MG tablet Take 1 tablet by mouth daily as needed for Migraine 9/16/21   Elizabeth Mane APRN - NP   atorvastatin (LIPITOR) 40 MG tablet Take 1 tablet by mouth daily 9/16/21   RANDI Parson - NP   MEPOLIZUMAB SC Inject into the skin    Historical Provider, MD PATIÑO PAIN RELIEVER 500 MG tablet  2/16/21   Historical Provider, MD   Incontinence Supply Disposable (INCONTINENCE BRIEF LARGE) [address] 1 each by Does not apply route daily 11/24/20 7/26/21  Iveth Brown MD       Future Appointments   Date Time Provider Ehsan Mack   11/19/2021 10:30 AM Real Guaman MD [place] [facility]   12/3/2021  2:00 PM [place] [facility] ECHO RM 1 [facility] SAM[facility] [place] [facility]   12/16/2021  1:00 PM Petar Parson

## 2021-11-17 ENCOUNTER — ANTI-COAG VISIT (OUTPATIENT)
Dept: PRIMARY CARE CLINIC | Age: 61
End: 2021-11-17

## 2021-11-17 ENCOUNTER — NURSE ONLY (OUTPATIENT)
Dept: PRIMARY CARE CLINIC | Age: 61
End: 2021-11-17
Payer: COMMERCIAL

## 2021-11-17 DIAGNOSIS — Z95.2 S/P AVR (AORTIC VALVE REPLACEMENT): Primary | ICD-10-CM

## 2021-11-17 LAB
INTERNATIONAL NORMALIZATION RATIO, POC: 1.8
PROTHROMBIN TIME, POC: NORMAL

## 2021-11-17 PROCEDURE — 85610 PROTHROMBIN TIME: CPT | Performed by: NURSE PRACTITIONER

## 2021-11-19 ENCOUNTER — OFFICE VISIT (OUTPATIENT)
Dept: NEUROSURGERY | Age: 61
End: 2021-11-19
Payer: COMMERCIAL

## 2021-11-19 VITALS
TEMPERATURE: 97.9 F | SYSTOLIC BLOOD PRESSURE: 118 MMHG | RESPIRATION RATE: 18 BRPM | HEIGHT: 66 IN | DIASTOLIC BLOOD PRESSURE: 78 MMHG | HEART RATE: 68 BPM | OXYGEN SATURATION: 97 % | WEIGHT: 149 LBS | BODY MASS INDEX: 23.95 KG/M2

## 2021-11-19 DIAGNOSIS — G91.2 NPH (NORMAL PRESSURE HYDROCEPHALUS) (HCC): Primary | ICD-10-CM

## 2021-11-19 PROCEDURE — G8484 FLU IMMUNIZE NO ADMIN: HCPCS | Performed by: NEUROLOGICAL SURGERY

## 2021-11-19 PROCEDURE — 4004F PT TOBACCO SCREEN RCVD TLK: CPT | Performed by: NEUROLOGICAL SURGERY

## 2021-11-19 PROCEDURE — G8427 DOCREV CUR MEDS BY ELIG CLIN: HCPCS | Performed by: NEUROLOGICAL SURGERY

## 2021-11-19 PROCEDURE — 3017F COLORECTAL CA SCREEN DOC REV: CPT | Performed by: NEUROLOGICAL SURGERY

## 2021-11-19 PROCEDURE — 99212 OFFICE O/P EST SF 10 MIN: CPT | Performed by: NEUROLOGICAL SURGERY

## 2021-11-19 PROCEDURE — G8420 CALC BMI NORM PARAMETERS: HCPCS | Performed by: NEUROLOGICAL SURGERY

## 2021-11-19 RX ORDER — CLINDAMYCIN HYDROCHLORIDE 300 MG/1
CAPSULE ORAL
COMMUNITY
Start: 2021-11-09 | End: 2022-03-18 | Stop reason: SDUPTHER

## 2021-11-19 RX ORDER — FLUTICASONE PROPIONATE 50 MCG
SPRAY, SUSPENSION (ML) NASAL
COMMUNITY
Start: 2021-11-09

## 2021-12-01 ENCOUNTER — NURSE ONLY (OUTPATIENT)
Dept: PRIMARY CARE CLINIC | Age: 61
End: 2021-12-01
Payer: COMMERCIAL

## 2021-12-01 ENCOUNTER — ANTI-COAG VISIT (OUTPATIENT)
Dept: PRIMARY CARE CLINIC | Age: 61
End: 2021-12-01

## 2021-12-01 DIAGNOSIS — Z95.2 S/P AVR (AORTIC VALVE REPLACEMENT): Primary | ICD-10-CM

## 2021-12-01 LAB
INTERNATIONAL NORMALIZATION RATIO, POC: 1.8
PROTHROMBIN TIME, POC: NORMAL

## 2021-12-01 PROCEDURE — 85610 PROTHROMBIN TIME: CPT | Performed by: NURSE PRACTITIONER

## 2021-12-01 RX ORDER — WARFARIN SODIUM 1 MG/1
1 TABLET ORAL DAILY
Qty: 30 TABLET | Refills: 3 | Status: SHIPPED
Start: 2021-12-01 | End: 2022-03-18 | Stop reason: SDUPTHER

## 2021-12-01 RX ORDER — WARFARIN SODIUM 5 MG/1
5 TABLET ORAL DAILY
Qty: 30 TABLET | Refills: 3 | Status: SHIPPED
Start: 2021-12-01 | End: 2022-03-18 | Stop reason: SDUPTHER

## 2021-12-01 NOTE — RESULT ENCOUNTER NOTE
Can we see if we can refer the patient to anti-coagulation clinic? If not has she been seen by cardiology? I do not see any notes. I am very concerned that she is not taking the medication the proper way. She reported that she has been taking 5 mg daily. Though her INR has not changed over the past month despite dosing changes. Increase to 6 mg Monday Wednesday Friday and 5 mg all other days. Recheck in 1 week. We will try to refer to Coumadin clinic.

## 2021-12-03 ENCOUNTER — HOSPITAL ENCOUNTER (OUTPATIENT)
Dept: CARDIOLOGY | Age: 61
Discharge: HOME | End: 2021-12-03
Payer: COMMERCIAL

## 2021-12-03 DIAGNOSIS — Z95.2 S/P AVR (AORTIC VALVE REPLACEMENT): ICD-10-CM

## 2021-12-03 LAB
LV EF: 65 %
LVEF MODALITY: NORMAL

## 2021-12-03 PROCEDURE — 93306 TTE W/DOPPLER COMPLETE: CPT | Performed by: PSYCHIATRY & NEUROLOGY

## 2021-12-06 ENCOUNTER — TELEPHONE (OUTPATIENT)
Dept: CARDIOLOGY CLINIC | Age: 61
End: 2021-12-06

## 2021-12-06 NOTE — TELEPHONE ENCOUNTER
----- Message from Luther Manzano MD sent at 12/3/2021  8:44 PM EST -----  Valve function normal  Ov one year

## 2021-12-08 ENCOUNTER — NURSE ONLY (OUTPATIENT)
Dept: PRIMARY CARE CLINIC | Age: 61
End: 2021-12-08
Payer: COMMERCIAL

## 2021-12-08 ENCOUNTER — ANTI-COAG VISIT (OUTPATIENT)
Dept: PRIMARY CARE CLINIC | Age: 61
End: 2021-12-08

## 2021-12-08 DIAGNOSIS — Z95.2 S/P AVR (AORTIC VALVE REPLACEMENT): Primary | ICD-10-CM

## 2021-12-08 LAB
INTERNATIONAL NORMALIZATION RATIO, POC: 3.2
PROTHROMBIN TIME, POC: NORMAL

## 2021-12-08 PROCEDURE — 85610 PROTHROMBIN TIME: CPT | Performed by: NURSE PRACTITIONER

## 2021-12-10 ENCOUNTER — TELEPHONE (OUTPATIENT)
Dept: PRIMARY CARE CLINIC | Age: 61
End: 2021-12-10

## 2021-12-10 NOTE — TELEPHONE ENCOUNTER
Last Appointment:  9/16/2021  Future Appointments   Date Time Provider Ehsan Mack   12/16/2021  1:00 PM RANDI Holland - NP [facility]   12/6/2022 10:00 AM Sujatha Sierra MD [address] from [facility] called regarding paperwork she has faxed over for this patient. She needs to have paperwork return as soon as possible. I did not find paperwork in media. Jamie Jeremy will re-fax forms today and she can be reached at [phone].     Electronically signed by Param Marcum LPN on 19/92/5919 at 10:34 AM

## 2021-12-13 NOTE — TELEPHONE ENCOUNTER
Can we check and see if we received this repeat fax? I will stop in the office sometime this week to sign OR Dr. Nathan Pino can review and sign.

## 2021-12-15 ENCOUNTER — TELEPHONE (OUTPATIENT)
Dept: PRIMARY CARE CLINIC | Age: 61
End: 2021-12-15

## 2021-12-15 ENCOUNTER — TELEPHONE (OUTPATIENT)
Dept: FAMILY MEDICINE CLINIC | Age: 61
End: 2021-12-15

## 2021-12-15 ENCOUNTER — NURSE ONLY (OUTPATIENT)
Dept: PRIMARY CARE CLINIC | Age: 61
End: 2021-12-15
Payer: COMMERCIAL

## 2021-12-15 DIAGNOSIS — Z79.01 ANTICOAGULANT LONG-TERM USE: ICD-10-CM

## 2021-12-15 LAB
INTERNATIONAL NORMALIZATION RATIO, POC: 2.7
PROTHROMBIN TIME, POC: 31.8

## 2021-12-15 PROCEDURE — 93793 ANTICOAG MGMT PT WARFARIN: CPT | Performed by: NURSE PRACTITIONER

## 2021-12-15 PROCEDURE — 99999 PR OFFICE/OUTPT VISIT,PROCEDURE ONLY: CPT | Performed by: NURSE PRACTITIONER

## 2021-12-15 NOTE — TELEPHONE ENCOUNTER
[facility] called stating they need the papers signed to continue her care. They asked since Jeni Buck was a nurse practioner who she was under. I did state Dr. Iven Seip. They stated that Dr Iven Seip could sign since Jeni Buck was out right now.

## 2021-12-15 NOTE — TELEPHONE ENCOUNTER
Spoke with Dr. Phil Horvath about pt INR she stated Pt can stay on same dose and come back in next week to recheck due to pt stating she is having some bruising.

## 2021-12-29 ENCOUNTER — NURSE ONLY (OUTPATIENT)
Dept: PRIMARY CARE CLINIC | Age: 61
End: 2021-12-29
Payer: COMMERCIAL

## 2021-12-29 DIAGNOSIS — Z79.01 ANTICOAGULANT LONG-TERM USE: Primary | ICD-10-CM

## 2021-12-29 LAB
INTERNATIONAL NORMALIZATION RATIO, POC: 4
PROTHROMBIN TIME, POC: 48.6

## 2021-12-29 PROCEDURE — 85610 PROTHROMBIN TIME: CPT | Performed by: NURSE PRACTITIONER

## 2022-01-03 ENCOUNTER — ANTI-COAG VISIT (OUTPATIENT)
Dept: PRIMARY CARE CLINIC | Age: 62
End: 2022-01-03

## 2022-01-03 ENCOUNTER — NURSE ONLY (OUTPATIENT)
Dept: PRIMARY CARE CLINIC | Age: 62
End: 2022-01-03
Payer: COMMERCIAL

## 2022-01-03 DIAGNOSIS — Z95.2 S/P AVR (AORTIC VALVE REPLACEMENT): Primary | ICD-10-CM

## 2022-01-03 LAB
INTERNATIONAL NORMALIZATION RATIO, POC: 1.7
PROTHROMBIN TIME, POC: NORMAL

## 2022-01-03 PROCEDURE — 85610 PROTHROMBIN TIME: CPT | Performed by: NURSE PRACTITIONER

## 2022-01-04 NOTE — PROGRESS NOTES
Reviewed anticoag track. She's been on 5mg the last several days. Have her increase dosing to 6mg Tuesday (today) and Friday, 5mg rest of the days. Repeat 1 week. Notify office if any bleeding/bruising.

## 2022-01-10 ENCOUNTER — NURSE ONLY (OUTPATIENT)
Dept: PRIMARY CARE CLINIC | Age: 62
End: 2022-01-10
Payer: COMMERCIAL

## 2022-01-10 DIAGNOSIS — Z95.2 S/P AVR (AORTIC VALVE REPLACEMENT): Primary | ICD-10-CM

## 2022-01-10 LAB
INTERNATIONAL NORMALIZATION RATIO, POC: 3.8
PROTHROMBIN TIME, POC: 46.2

## 2022-01-10 PROCEDURE — 85610 PROTHROMBIN TIME: CPT | Performed by: NURSE PRACTITIONER

## 2022-02-22 ENCOUNTER — NURSE ONLY (OUTPATIENT)
Dept: PRIMARY CARE CLINIC | Age: 62
End: 2022-02-22
Payer: COMMERCIAL

## 2022-02-22 DIAGNOSIS — Z79.01 ANTICOAGULANT LONG-TERM USE: Primary | ICD-10-CM

## 2022-02-22 LAB
INTERNATIONAL NORMALIZATION RATIO, POC: 2.2
INTERNATIONAL NORMALIZATION RATIO, POC: NORMAL
PROTHROMBIN TIME, POC: 2.2

## 2022-02-22 PROCEDURE — 85610 PROTHROMBIN TIME: CPT | Performed by: NURSE PRACTITIONER

## 2022-02-22 NOTE — RESULT ENCOUNTER NOTE
Take 6mg all days, except 5mg on Monday and Thursday. Repeat in 10 days. Did she get into the coumadin clinic?

## 2022-03-18 ENCOUNTER — OFFICE VISIT (OUTPATIENT)
Dept: PRIMARY CARE CLINIC | Age: 62
End: 2022-03-18
Payer: COMMERCIAL

## 2022-03-18 VITALS — BODY MASS INDEX: 23.63 KG/M2 | WEIGHT: 147 LBS | HEIGHT: 66 IN | RESPIRATION RATE: 16 BRPM

## 2022-03-18 DIAGNOSIS — J30.9 ALLERGIC RHINITIS, UNSPECIFIED SEASONALITY, UNSPECIFIED TRIGGER: ICD-10-CM

## 2022-03-18 DIAGNOSIS — Z12.31 ENCOUNTER FOR SCREENING MAMMOGRAM FOR MALIGNANT NEOPLASM OF BREAST: ICD-10-CM

## 2022-03-18 DIAGNOSIS — E78.5 HYPERLIPIDEMIA, UNSPECIFIED HYPERLIPIDEMIA TYPE: Primary | ICD-10-CM

## 2022-03-18 DIAGNOSIS — Z72.0 TOBACCO ABUSE: ICD-10-CM

## 2022-03-18 DIAGNOSIS — K02.9 DENTAL CARIES: ICD-10-CM

## 2022-03-18 DIAGNOSIS — F34.1 DYSTHYMIA: ICD-10-CM

## 2022-03-18 DIAGNOSIS — Z95.2 S/P AVR (AORTIC VALVE REPLACEMENT): ICD-10-CM

## 2022-03-18 DIAGNOSIS — R51.9 INTRACTABLE HEADACHE, UNSPECIFIED CHRONICITY PATTERN, UNSPECIFIED HEADACHE TYPE: ICD-10-CM

## 2022-03-18 DIAGNOSIS — G91.2 NPH (NORMAL PRESSURE HYDROCEPHALUS) (HCC): ICD-10-CM

## 2022-03-18 DIAGNOSIS — Z79.01 ANTICOAGULATED: ICD-10-CM

## 2022-03-18 DIAGNOSIS — E78.5 HYPERLIPIDEMIA, UNSPECIFIED HYPERLIPIDEMIA TYPE: ICD-10-CM

## 2022-03-18 DIAGNOSIS — Z12.11 SCREENING FOR COLON CANCER: ICD-10-CM

## 2022-03-18 LAB
ALBUMIN SERPL-MCNC: 4.3 G/DL (ref 3.5–5.2)
ALP BLD-CCNC: 78 U/L (ref 35–104)
ALT SERPL-CCNC: 15 U/L (ref 0–32)
ANION GAP SERPL CALCULATED.3IONS-SCNC: 13 MMOL/L (ref 7–16)
AST SERPL-CCNC: 17 U/L (ref 0–31)
BILIRUB SERPL-MCNC: 0.5 MG/DL (ref 0–1.2)
BUN BLDV-MCNC: 8 MG/DL (ref 6–23)
CALCIUM SERPL-MCNC: 9.7 MG/DL (ref 8.6–10.2)
CHLORIDE BLD-SCNC: 103 MMOL/L (ref 98–107)
CHOLESTEROL, TOTAL: 144 MG/DL (ref 0–199)
CO2: 21 MMOL/L (ref 22–29)
CREAT SERPL-MCNC: 0.6 MG/DL (ref 0.5–1)
GFR AFRICAN AMERICAN: >60
GFR NON-AFRICAN AMERICAN: >60 ML/MIN/1.73
GLUCOSE BLD-MCNC: 86 MG/DL (ref 74–99)
HCT VFR BLD CALC: 40.9 % (ref 34–48)
HDLC SERPL-MCNC: 44 MG/DL
HEMOGLOBIN: 13.4 G/DL (ref 11.5–15.5)
INTERNATIONAL NORMALIZATION RATIO, POC: 3.1
LDL CHOLESTEROL CALCULATED: 76 MG/DL (ref 0–99)
MCH RBC QN AUTO: 30.3 PG (ref 26–35)
MCHC RBC AUTO-ENTMCNC: 32.8 % (ref 32–34.5)
MCV RBC AUTO: 92.5 FL (ref 80–99.9)
PDW BLD-RTO: 13.1 FL (ref 11.5–15)
PLATELET # BLD: 231 E9/L (ref 130–450)
PMV BLD AUTO: 10.9 FL (ref 7–12)
POTASSIUM SERPL-SCNC: 4 MMOL/L (ref 3.5–5)
PROTHROMBIN TIME, POC: NORMAL
RBC # BLD: 4.42 E12/L (ref 3.5–5.5)
SODIUM BLD-SCNC: 137 MMOL/L (ref 132–146)
TOTAL PROTEIN: 7.2 G/DL (ref 6.4–8.3)
TRIGL SERPL-MCNC: 122 MG/DL (ref 0–149)
TSH SERPL DL<=0.05 MIU/L-ACNC: 2.53 UIU/ML (ref 0.27–4.2)
VLDLC SERPL CALC-MCNC: 24 MG/DL
WBC # BLD: 5.5 E9/L (ref 4.5–11.5)

## 2022-03-18 PROCEDURE — G8484 FLU IMMUNIZE NO ADMIN: HCPCS | Performed by: NURSE PRACTITIONER

## 2022-03-18 PROCEDURE — 4004F PT TOBACCO SCREEN RCVD TLK: CPT | Performed by: NURSE PRACTITIONER

## 2022-03-18 PROCEDURE — 99214 OFFICE O/P EST MOD 30 MIN: CPT | Performed by: NURSE PRACTITIONER

## 2022-03-18 PROCEDURE — G8420 CALC BMI NORM PARAMETERS: HCPCS | Performed by: NURSE PRACTITIONER

## 2022-03-18 PROCEDURE — 85610 PROTHROMBIN TIME: CPT | Performed by: NURSE PRACTITIONER

## 2022-03-18 PROCEDURE — 3017F COLORECTAL CA SCREEN DOC REV: CPT | Performed by: NURSE PRACTITIONER

## 2022-03-18 PROCEDURE — G8427 DOCREV CUR MEDS BY ELIG CLIN: HCPCS | Performed by: NURSE PRACTITIONER

## 2022-03-18 RX ORDER — PAROXETINE 30 MG/1
TABLET, FILM COATED ORAL
Qty: 30 TABLET | Refills: 2 | Status: SHIPPED
Start: 2022-03-18 | End: 2022-06-21 | Stop reason: SDUPTHER

## 2022-03-18 RX ORDER — WARFARIN SODIUM 1 MG/1
1 TABLET ORAL DAILY
Qty: 30 TABLET | Refills: 3 | Status: SHIPPED
Start: 2022-03-18 | End: 2022-06-21 | Stop reason: DRUGHIGH

## 2022-03-18 RX ORDER — NICOTINE 21 MG/24HR
1 PATCH, TRANSDERMAL 24 HOURS TRANSDERMAL EVERY 24 HOURS
Qty: 30 PATCH | Refills: 3 | Status: SHIPPED
Start: 2022-03-18 | End: 2022-06-21

## 2022-03-18 RX ORDER — WARFARIN SODIUM 5 MG/1
5 TABLET ORAL DAILY
Qty: 30 TABLET | Refills: 3 | Status: SHIPPED
Start: 2022-03-18 | End: 2022-06-21 | Stop reason: SDUPTHER

## 2022-03-18 RX ORDER — ATORVASTATIN CALCIUM 40 MG/1
40 TABLET, FILM COATED ORAL DAILY
Qty: 30 TABLET | Refills: 3 | Status: SHIPPED
Start: 2022-03-18 | End: 2022-06-21 | Stop reason: SDUPTHER

## 2022-03-18 RX ORDER — CETIRIZINE HYDROCHLORIDE 10 MG/1
10 TABLET ORAL DAILY
Qty: 30 TABLET | Refills: 0 | Status: SHIPPED
Start: 2022-03-18 | End: 2022-06-21 | Stop reason: SDUPTHER

## 2022-03-18 RX ORDER — CLINDAMYCIN HYDROCHLORIDE 300 MG/1
300 CAPSULE ORAL 4 TIMES DAILY
Qty: 40 CAPSULE | Refills: 0 | Status: SHIPPED | OUTPATIENT
Start: 2022-03-18 | End: 2022-03-28

## 2022-03-18 RX ORDER — SUMATRIPTAN 25 MG/1
25 TABLET, FILM COATED ORAL DAILY PRN
Qty: 9 TABLET | Refills: 0 | Status: SHIPPED
Start: 2022-03-18 | End: 2022-06-21 | Stop reason: SDUPTHER

## 2022-03-18 RX ORDER — GREEN TEA/HOODIA GORDONII 315-12.5MG
1 CAPSULE ORAL DAILY
Qty: 30 TABLET | Refills: 0 | Status: SHIPPED | OUTPATIENT
Start: 2022-03-18 | End: 2022-04-17

## 2022-03-18 ASSESSMENT — PATIENT HEALTH QUESTIONNAIRE - PHQ9
8. MOVING OR SPEAKING SO SLOWLY THAT OTHER PEOPLE COULD HAVE NOTICED. OR THE OPPOSITE, BEING SO FIGETY OR RESTLESS THAT YOU HAVE BEEN MOVING AROUND A LOT MORE THAN USUAL: 1
7. TROUBLE CONCENTRATING ON THINGS, SUCH AS READING THE NEWSPAPER OR WATCHING TELEVISION: 1
SUM OF ALL RESPONSES TO PHQ QUESTIONS 1-9: 7
SUM OF ALL RESPONSES TO PHQ QUESTIONS 1-9: 7
6. FEELING BAD ABOUT YOURSELF - OR THAT YOU ARE A FAILURE OR HAVE LET YOURSELF OR YOUR FAMILY DOWN: 1
5. POOR APPETITE OR OVEREATING: 1
SUM OF ALL RESPONSES TO PHQ QUESTIONS 1-9: 7
3. TROUBLE FALLING OR STAYING ASLEEP: 1
2. FEELING DOWN, DEPRESSED OR HOPELESS: 1
9. THOUGHTS THAT YOU WOULD BE BETTER OFF DEAD, OR OF HURTING YOURSELF: 0
4. FEELING TIRED OR HAVING LITTLE ENERGY: 1
SUM OF ALL RESPONSES TO PHQ QUESTIONS 1-9: 7
10. IF YOU CHECKED OFF ANY PROBLEMS, HOW DIFFICULT HAVE THESE PROBLEMS MADE IT FOR YOU TO DO YOUR WORK, TAKE CARE OF THINGS AT HOME, OR GET ALONG WITH OTHER PEOPLE: 1

## 2022-03-18 ASSESSMENT — ENCOUNTER SYMPTOMS
EYE ITCHING: 0
CONSTIPATION: 0
EYE DISCHARGE: 0
APNEA: 0
FACIAL SWELLING: 0
COLOR CHANGE: 0
ABDOMINAL PAIN: 0
SHORTNESS OF BREATH: 0
ABDOMINAL DISTENTION: 0
CHEST TIGHTNESS: 0
VOMITING: 0
COUGH: 0
RHINORRHEA: 0
BACK PAIN: 0
WHEEZING: 0
DIARRHEA: 0
NAUSEA: 0
VOICE CHANGE: 0

## 2022-03-18 NOTE — PROGRESS NOTES
2022     Isla Bers Holter 59 y.o. female    : 1960    Chief Complaint:   Hyperlipidemia and Coagulation Disorder       History of Present Illness:   Teagan Day is a 59 y.o. female who presents to the office to establish care and follow-up on her chronic problems. She has a pertinent past medical history of an AVR about 30 years ago in [place]. She currently does not follow with cardiology, at previous visit, I referred to Dr. Nate Oh. She did not follow up. She is taking warfarin with a goal INR of 2.5-3.5. INR today is 3.1. She is noncompliant with warfarin dosing and was previously referred to the coumadin clinic due to sub/supratherapeutic INR's. She declines referral.    Hyperlipidemia- she is currently taking Lipitor without any side effects. She does not exercise on a regular basis. She does attempt to follow a low-salt diet. Anxiety and depression are poorly controlled. She recently moved in with her daughter and 3 grandchildren which can be overwhelming at times. She is taking Paxil 20mg daily. She does not dissipate in counseling. Denies any suicidal or homicidal ideation. NPH status post  shunt 4 years ago in [place]. Follows with Dr. Laurence Garcia. Chronic headaches-follows with Dr. Laura Lozano. [place]. She is not up-to-date with health maintenance screenings.     Past Medical History:     Past Medical History:   Diagnosis Date    Anxiety     Depression     Hyperlipidemia        Past Surgical History:   Procedure Laterality Date    AORTIC VALVE REPLACEMENT      OTHER SURGICAL HISTORY      shunt placement     TONSILLECTOMY         Family History   Family history unknown: Yes       Social History     Tobacco Use    Smoking status: Current Every Day Smoker     Packs/day: 0.25     Years: 40.00     Pack years: 10.00     Types: Cigarettes     Start date: 1980    Smokeless tobacco: Never Used    Tobacco comment: Smokes 3-5 cigarettes per day   Substance Use Topics  Alcohol use: Not Currently    Drug use: Never       Medications:     Current Outpatient Medications:     warfarin (COUMADIN) 5 MG tablet, Take 1 tablet by mouth daily, Disp: 30 tablet, Rfl: 3    warfarin (COUMADIN) 1 MG tablet, Take 1 tablet by mouth daily With the 5mg tablet to equal 6 mg on Monday, Wednesday and Friday, Disp: 30 tablet, Rfl: 3    SUMAtriptan (IMITREX) 25 MG tablet, Take 1 tablet by mouth daily as needed for Migraine, Disp: 9 tablet, Rfl: 0    PARoxetine (PAXIL) 30 MG tablet, TAKE 1 TABLET BY MOUTH ONCE DAILY IN THE MORNING, Disp: 30 tablet, Rfl: 2    cetirizine (ZYRTEC ALLERGY) 10 MG tablet, Take 1 tablet by mouth daily, Disp: 30 tablet, Rfl: 0    atorvastatin (LIPITOR) 40 MG tablet, Take 1 tablet by mouth daily, Disp: 30 tablet, Rfl: 3    Probiotic Acidophilus (FLORANEX) TABS, Take 1 tablet by mouth daily, Disp: 30 tablet, Rfl: 0    nicotine (NICODERM CQ) 14 MG/24HR, Place 1 patch onto the skin every 24 hours, Disp: 30 patch, Rfl: 3    clindamycin (CLEOCIN) 300 MG capsule, Take 1 capsule by mouth 4 times daily for 10 days, Disp: 40 capsule, Rfl: 0    fluticasone (FLONASE) 50 MCG/ACT nasal spray, , Disp: , Rfl:     MEPOLIZUMAB SC, Inject into the skin, Disp: , Rfl:     EQ PAIN RELIEVER 500 MG tablet, , Disp: , Rfl:     Incontinence Supply Disposable (INCONTINENCE BRIEF LARGE) MISC, 1 each by Does not apply route daily, Disp: 2 Package, Rfl: 3    Allergies   Allergen Reactions    Pcn [Penicillins]     Doxycycline Hyclate Nausea And Vomiting       Review of Systems:   Review of Systems   Constitutional: Negative for activity change, appetite change, fatigue, fever and unexpected weight change. HENT: Negative for congestion, facial swelling, mouth sores, postnasal drip, rhinorrhea, sneezing, tinnitus and voice change. Eyes: Negative for discharge and itching. Respiratory: Negative for apnea, cough, chest tightness, shortness of breath and wheezing.     Cardiovascular: Negative for chest pain, palpitations and leg swelling. Gastrointestinal: Negative for abdominal distention, abdominal pain, constipation, diarrhea, nausea and vomiting. Endocrine: Negative for cold intolerance and heat intolerance. Genitourinary: Negative for difficulty urinating, dysuria, flank pain, frequency, pelvic pain and urgency. Musculoskeletal: Negative for back pain, gait problem, joint swelling, myalgias and neck pain. Skin: Negative for color change, pallor, rash and wound. Allergic/Immunologic: Negative for environmental allergies and food allergies. Neurological: Negative for dizziness, tremors, seizures, syncope, facial asymmetry, speech difficulty, weakness, light-headedness, numbness and headaches. Psychiatric/Behavioral: Negative for agitation, behavioral problems, confusion, decreased concentration, dysphoric mood, sleep disturbance and suicidal ideas. The patient is not nervous/anxious and is not hyperactive. Physical Exam:   Vital Signs:  Resp 16   Ht 5' 6\" (1.676 m)   Wt 147 lb (66.7 kg)   BMI 23.73 kg/m²    Oxygen Saturation Interpretation: Normal.  Physical Exam  Vitals and nursing note reviewed. Constitutional:       Appearance: Normal appearance. She is normal weight. HENT:      Head: Normocephalic and atraumatic. Right Ear: Tympanic membrane, ear canal and external ear normal.      Left Ear: Tympanic membrane, ear canal and external ear normal.      Nose: Nose normal.      Mouth/Throat:      Mouth: Mucous membranes are moist.      Pharynx: Oropharynx is clear. Eyes:      Extraocular Movements: Extraocular movements intact. Conjunctiva/sclera: Conjunctivae normal.      Pupils: Pupils are equal, round, and reactive to light. Cardiovascular:      Rate and Rhythm: Normal rate and regular rhythm. Pulses:           Dorsalis pedis pulses are 1+ on the right side and 1+ on the left side. Heart sounds: Murmur heard.         Comments: laboratory and radiology results have been personally reviewed by myself. Labs:  Results for orders placed or performed in visit on 03/18/22   POCT INR   Result Value Ref Range    INR 3.1     Protime          Imaging: All Radiology results interpreted by Radiologist unless otherwise noted. No results found. Assessment/Plan:   I personally reviewed the patient's allergies, past medical history, medications, and vitals sign. Steffanie Pugh was seen today for hyperlipidemia and coagulation disorder. Diagnoses and all orders for this visit:    Hyperlipidemia, unspecified hyperlipidemia type  -     atorvastatin (LIPITOR) 40 MG tablet; Take 1 tablet by mouth daily  -     CBC; Future  -     Comprehensive Metabolic Panel; Future  -     Lipid Panel; Future  -     TSH; Future  -     [facility]    NPH (normal pressure hydrocephalus) (HCC)    S/P AVR (aortic valve replacement)  -     warfarin (COUMADIN) 5 MG tablet; Take 1 tablet by mouth daily  -     warfarin (COUMADIN) 1 MG tablet; Take 1 tablet by mouth daily With the 5mg tablet to equal 6 mg on Monday, Wednesday and Friday  [facility]    Intractable headache, unspecified chronicity pattern, unspecified headache type  -     SUMAtriptan (IMITREX) 25 MG tablet; Take 1 tablet by mouth daily as needed for Migraine    Dysthymia  -     PARoxetine (PAXIL) 30 MG tablet; TAKE 1 TABLET BY MOUTH ONCE DAILY IN THE MORNING  -     POCT INR    Encounter for screening mammogram for malignant neoplasm of breast  -     EDEL DIGITAL SCREEN BILATERAL PER PROTOCOL; Future    Tobacco abuse  -     nicotine (NICODERM CQ) 14 MG/24HR; Place 1 patch onto the skin every 24 hours    Screening for colon cancer  -     Fecal DNA Colorectal cancer screening (Cologuard)    Dental caries  -     Probiotic Acidophilus (FLORANEX) TABS; Take 1 tablet by mouth daily  -     clindamycin (CLEOCIN) 300 MG capsule;  Take 1 capsule by mouth 4 times daily for 10 days    Anticoagulated  -     [facility]    Allergic rhinitis, unspecified seasonality, unspecified trigger  -     cetirizine (ZYRTEC ALLERGY) 10 MG tablet; Take 1 tablet by mouth daily      Will obtain lab work today, will call with results. INR is therapeutic today at 3.1, continue current dose. Recheck in 3 weeks. Patient is agreeable to Cologuard screening, orders placed. A referral was placed to cardiology regarding status post AVR. Increase Paxil to 30 mg daily. Smoking cessation provided. Nicotine patches were ordered. Patient is to schedule a dentist appointment. Patient was placed on clindamycin with probiotic, side effects and administration instructions discussed. Call or go to ED immediately if symptoms worsen or persist.  Return in about 3 months for follow-up. Sooner if necessary. Counseled regarding above diagnosis, including possible risks and complications,especially if left uncontrolled. Counseled regarding the possible side effects, risks, benefits and alternatives to treatment; patient and/or guardian verbalizes understanding. Advised patient to call with any new medication issues. All questions answered. Reviewed age and gender appropriate health screening exams and vaccinations. Advised patient regarding importance of keeping up with recommended health maintenance and to schedule as soon as possible if overdue, as this is important in assessing for undiagnosed pathology, especially cancer. Patient verbalizes understanding and agrees.      SIGNATURE: Electronically signed by RANDI Starr NP on 3/18/2022 at 4:48 PM

## 2022-04-11 ENCOUNTER — NURSE ONLY (OUTPATIENT)
Dept: PRIMARY CARE CLINIC | Age: 62
End: 2022-04-11
Payer: COMMERCIAL

## 2022-04-11 DIAGNOSIS — Z95.2 S/P AVR (AORTIC VALVE REPLACEMENT): Primary | ICD-10-CM

## 2022-04-11 LAB
INTERNATIONAL NORMALIZATION RATIO, POC: 2.4
PROTHROMBIN TIME, POC: NORMAL

## 2022-04-11 PROCEDURE — 85610 PROTHROMBIN TIME: CPT | Performed by: NURSE PRACTITIONER

## 2022-04-18 ENCOUNTER — NURSE ONLY (OUTPATIENT)
Dept: PRIMARY CARE CLINIC | Age: 62
End: 2022-04-18
Payer: COMMERCIAL

## 2022-04-18 DIAGNOSIS — Z95.2 HISTORY OF ARTIFICIAL HEART VALVE: ICD-10-CM

## 2022-04-18 LAB
INTERNATIONAL NORMALIZATION RATIO, POC: 2.2
PROTHROMBIN TIME, POC: 26.5

## 2022-04-18 PROCEDURE — 93793 ANTICOAG MGMT PT WARFARIN: CPT | Performed by: NURSE PRACTITIONER

## 2022-04-18 PROCEDURE — 85610 PROTHROMBIN TIME: CPT | Performed by: NURSE PRACTITIONER

## 2022-04-27 ENCOUNTER — NURSE ONLY (OUTPATIENT)
Dept: PRIMARY CARE CLINIC | Age: 62
End: 2022-04-27
Payer: COMMERCIAL

## 2022-04-27 DIAGNOSIS — Z95.2 HISTORY OF ARTIFICIAL HEART VALVE: ICD-10-CM

## 2022-04-27 LAB
INTERNATIONAL NORMALIZATION RATIO, POC: 4.6
PROTHROMBIN TIME, POC: 55.6

## 2022-04-27 PROCEDURE — 85610 PROTHROMBIN TIME: CPT | Performed by: NURSE PRACTITIONER

## 2022-05-04 ENCOUNTER — NURSE ONLY (OUTPATIENT)
Dept: PRIMARY CARE CLINIC | Age: 62
End: 2022-05-04
Payer: COMMERCIAL

## 2022-05-04 ENCOUNTER — ANTI-COAG VISIT (OUTPATIENT)
Dept: PRIMARY CARE CLINIC | Age: 62
End: 2022-05-04

## 2022-05-04 ENCOUNTER — OFFICE VISIT (OUTPATIENT)
Dept: FAMILY MEDICINE CLINIC | Age: 62
End: 2022-05-04
Payer: COMMERCIAL

## 2022-05-04 VITALS
RESPIRATION RATE: 16 BRPM | SYSTOLIC BLOOD PRESSURE: 124 MMHG | OXYGEN SATURATION: 96 % | TEMPERATURE: 98.2 F | HEART RATE: 82 BPM | DIASTOLIC BLOOD PRESSURE: 70 MMHG

## 2022-05-04 VITALS
BODY MASS INDEX: 23.63 KG/M2 | RESPIRATION RATE: 16 BRPM | HEIGHT: 66 IN | DIASTOLIC BLOOD PRESSURE: 70 MMHG | TEMPERATURE: 98.2 F | SYSTOLIC BLOOD PRESSURE: 124 MMHG | OXYGEN SATURATION: 98 % | HEART RATE: 68 BPM | WEIGHT: 147 LBS

## 2022-05-04 DIAGNOSIS — R51.9 ACUTE NONINTRACTABLE HEADACHE, UNSPECIFIED HEADACHE TYPE: ICD-10-CM

## 2022-05-04 DIAGNOSIS — R42 DIZZINESS: Primary | ICD-10-CM

## 2022-05-04 DIAGNOSIS — Z95.2 S/P AVR (AORTIC VALVE REPLACEMENT): Primary | ICD-10-CM

## 2022-05-04 DIAGNOSIS — Z79.01 CHRONIC ANTICOAGULATION: ICD-10-CM

## 2022-05-04 LAB
INTERNATIONAL NORMALIZATION RATIO, POC: 2.1
PROTHROMBIN TIME, POC: NORMAL

## 2022-05-04 PROCEDURE — 85610 PROTHROMBIN TIME: CPT | Performed by: NURSE PRACTITIONER

## 2022-05-04 PROCEDURE — 99214 OFFICE O/P EST MOD 30 MIN: CPT | Performed by: PHYSICIAN ASSISTANT

## 2022-05-04 PROCEDURE — 3017F COLORECTAL CA SCREEN DOC REV: CPT | Performed by: PHYSICIAN ASSISTANT

## 2022-05-04 PROCEDURE — 4004F PT TOBACCO SCREEN RCVD TLK: CPT | Performed by: PHYSICIAN ASSISTANT

## 2022-05-04 PROCEDURE — G8427 DOCREV CUR MEDS BY ELIG CLIN: HCPCS | Performed by: PHYSICIAN ASSISTANT

## 2022-05-04 PROCEDURE — G8420 CALC BMI NORM PARAMETERS: HCPCS | Performed by: PHYSICIAN ASSISTANT

## 2022-05-04 ASSESSMENT — ENCOUNTER SYMPTOMS
COUGH: 0
SORE THROAT: 0
VOMITING: 0
DIARRHEA: 0
SHORTNESS OF BREATH: 0
ABDOMINAL PAIN: 0
NAUSEA: 0
BACK PAIN: 0
PHOTOPHOBIA: 0

## 2022-05-04 NOTE — PROGRESS NOTES
Patient in today for an INR       Patient does not know what she is supposed to be taking and she skipped last nights dose because she was feeling sick and dizzy

## 2022-05-04 NOTE — PROGRESS NOTES
22  Jacqui Oscar Holter : 1960 Sex: female  Age 59 y.o. Subjective:  Chief Complaint   Patient presents with    Dizziness     x2 days    Headache         58-year-old female with a history of normal pressure hydrocephalus with shunt placement, aortic valve replacement and hyperlipidemia presents to the walk-in clinic today. She initially came in for an INR check. Her INR today is 2.1. Patient states that she takes her Coumadin in the evenings and has not taken it today. She states she also did not take it last night. She states that since yesterday afternoon she has had persistent dizziness. She describes the room spinning. She states this morning when she woke up she had a headache. She describes the headache as thumping and pounding on the sides of the front of her head bilaterally. She has associated nausea but denies vomiting. Patient states she has had dizziness like this in the past before she had her shunt placed. Patient denies shortness of breath or chest pain. She denies fever or chills. Patient denies lateralizing weakness but does note general weakness and fatigue. She states this has been ongoing for longer than the dizziness. She did not drive herself here. Review of Systems   Constitutional: Positive for fatigue. Negative for chills and fever. HENT: Negative for congestion, ear pain and sore throat. Eyes: Negative for photophobia and visual disturbance. Respiratory: Negative for cough and shortness of breath. Cardiovascular: Negative for chest pain. Gastrointestinal: Negative for abdominal pain, diarrhea, nausea and vomiting. Genitourinary: Negative for difficulty urinating, dysuria, frequency and urgency. Musculoskeletal: Negative for back pain, neck pain and neck stiffness. Skin: Negative for rash. Neurological: Positive for dizziness, weakness and headaches. Negative for syncope and light-headedness. Hematological: Negative for adenopathy. Does not bruise/bleed easily. Psychiatric/Behavioral: Negative for agitation and confusion. All other systems reviewed and are negative. PMH:     Past Medical History:   Diagnosis Date    Anxiety     Depression     Hyperlipidemia        Past Surgical History:   Procedure Laterality Date    AORTIC VALVE REPLACEMENT  1982    OTHER SURGICAL HISTORY      shunt placement     TONSILLECTOMY         Family History   Family history unknown: Yes       Medications:     Current Outpatient Medications:     warfarin (COUMADIN) 5 MG tablet, Take 1 tablet by mouth daily, Disp: 30 tablet, Rfl: 3    warfarin (COUMADIN) 1 MG tablet, Take 1 tablet by mouth daily With the 5mg tablet to equal 6 mg on Monday, Wednesday and Friday, Disp: 30 tablet, Rfl: 3    SUMAtriptan (IMITREX) 25 MG tablet, Take 1 tablet by mouth daily as needed for Migraine, Disp: 9 tablet, Rfl: 0    PARoxetine (PAXIL) 30 MG tablet, TAKE 1 TABLET BY MOUTH ONCE DAILY IN THE MORNING, Disp: 30 tablet, Rfl: 2    cetirizine (ZYRTEC ALLERGY) 10 MG tablet, Take 1 tablet by mouth daily, Disp: 30 tablet, Rfl: 0    atorvastatin (LIPITOR) 40 MG tablet, Take 1 tablet by mouth daily, Disp: 30 tablet, Rfl: 3    nicotine (NICODERM CQ) 14 MG/24HR, Place 1 patch onto the skin every 24 hours, Disp: 30 patch, Rfl: 3    fluticasone (FLONASE) 50 MCG/ACT nasal spray, , Disp: , Rfl:     MEPOLIZUMAB SC, Inject into the skin, Disp: , Rfl:     EQ PAIN RELIEVER 500 MG tablet, , Disp: , Rfl:     Incontinence Supply Disposable (INCONTINENCE BRIEF LARGE) MISC, 1 each by Does not apply route daily, Disp: 2 Package, Rfl: 3    Allergies:      Allergies   Allergen Reactions    Paroxetine Hcl Diarrhea     Adverse Reaction;     Pcn [Penicillins]     Doxycycline Hyclate Nausea And Vomiting    Trazodone Hcl Headaches     Other reaction(s): HEADACHE         Social History:     Social History     Tobacco Use    Smoking status: Current Every Day Smoker     Packs/day: 0.25 Years: 40.00     Pack years: 10.00     Types: Cigarettes     Start date: 8/4/1980    Smokeless tobacco: Never Used    Tobacco comment: Smokes 3-5 cigarettes per day   Substance Use Topics    Alcohol use: Not Currently    Drug use: Never       Patient lives at home. Physical Exam:     Vitals:    05/04/22 1430   BP: 124/70   Pulse: 82   Resp: 16   Temp: 98.2 °F (36.8 °C)   SpO2: 96%       Exam:  Physical Exam  Vitals and nursing note reviewed. Constitutional:       General: She is not in acute distress. Appearance: She is well-developed. HENT:      Head: Normocephalic and atraumatic. Right Ear: Tympanic membrane normal.      Left Ear: Tympanic membrane normal.      Nose: Nose normal.      Mouth/Throat:      Mouth: Mucous membranes are moist.      Pharynx: Oropharynx is clear. Eyes:      Conjunctiva/sclera: Conjunctivae normal.      Pupils: Pupils are equal, round, and reactive to light. Cardiovascular:      Rate and Rhythm: Normal rate and regular rhythm. Pulmonary:      Effort: Pulmonary effort is normal. No respiratory distress. Breath sounds: Normal breath sounds. Abdominal:      General: Bowel sounds are normal.      Palpations: Abdomen is soft. Tenderness: There is no abdominal tenderness. Musculoskeletal:         General: Normal range of motion. Cervical back: Normal range of motion. No rigidity. Comments: Patient needed assistance with ambulation. She is unsteady on her feet. Lymphadenopathy:      Cervical: No cervical adenopathy. Skin:     General: Skin is warm and dry. Neurological:      General: No focal deficit present. Mental Status: She is alert and oriented to person, place, and time. Mental status is at baseline. Cranial Nerves: No cranial nerve deficit. Sensory: No sensory deficit. Motor: No weakness.       Coordination: Coordination normal.      Gait: Gait normal.      Deep Tendon Reflexes: Reflexes normal.      Comments: [facility] equals 0   Psychiatric:         Mood and Affect: Mood normal.         Behavior: Behavior normal.         Thought Content: Thought content normal.         Judgment: Judgment normal.           Testing:           Medical Decision Making:       Patient upon arrival did not appear toxic or lethargic. Vital signs were reviewed. Past medical history reviewed. Allergies reviewed. Medications reviewed. Patient is presenting with the above complaint of dizziness and headache. Differential diagnosis was discussed with the patient. Her INR today is 2.1. Patient was educated on our limitations of express care and the need for more emergent evaluation the emergency department. Patient was offered transfer by EMS but she refuses. She would like her ex- to drive her directly to the emergency department. She does agree to go directly to the emergency department. She understands the risks of driving by private vehicle including MVA, injury, decompensation, death and disability. Clinical Impression:   Destiney Hernandez was seen today for dizziness and headache. Diagnoses and all orders for this visit:    Dizziness    Acute nonintractable headache, unspecified headache type    Chronic anticoagulation        Go directly to the emergency department. SIGNATURE: Mitzy Daigle PA-C

## 2022-05-18 NOTE — TELEPHONE ENCOUNTER
Daughter is aware, she stated that she does see Doctor Kanika Tarango.  She is supposed to have another ECHO and then she will see the cardiologist again after the test. Concern for delay in diagnosis and treatment

## 2022-05-27 ENCOUNTER — NURSE ONLY (OUTPATIENT)
Dept: PRIMARY CARE CLINIC | Age: 62
End: 2022-05-27
Payer: COMMERCIAL

## 2022-05-27 ENCOUNTER — ANTI-COAG VISIT (OUTPATIENT)
Dept: PRIMARY CARE CLINIC | Age: 62
End: 2022-05-27

## 2022-05-27 DIAGNOSIS — Z95.2 S/P AVR (AORTIC VALVE REPLACEMENT): Primary | ICD-10-CM

## 2022-05-27 LAB
INTERNATIONAL NORMALIZATION RATIO, POC: 7
PROTHROMBIN TIME, POC: NORMAL

## 2022-05-27 PROCEDURE — 85610 PROTHROMBIN TIME: CPT | Performed by: NURSE PRACTITIONER

## 2022-05-27 NOTE — PROGRESS NOTES
Patient is taking 6 mg daily and denies any abnormal bruising or bleeding.        Per [facility], patient is to hold medications all weekend and retest on Monday

## 2022-05-31 ENCOUNTER — ANTI-COAG VISIT (OUTPATIENT)
Dept: PRIMARY CARE CLINIC | Age: 62
End: 2022-05-31

## 2022-05-31 ENCOUNTER — NURSE ONLY (OUTPATIENT)
Dept: PRIMARY CARE CLINIC | Age: 62
End: 2022-05-31
Payer: COMMERCIAL

## 2022-05-31 DIAGNOSIS — Z95.2 S/P AVR (AORTIC VALVE REPLACEMENT): Primary | ICD-10-CM

## 2022-05-31 LAB
INTERNATIONAL NORMALIZATION RATIO, POC: 1.2
PROTHROMBIN TIME, POC: NORMAL

## 2022-05-31 PROCEDURE — 85610 PROTHROMBIN TIME: CPT | Performed by: NURSE PRACTITIONER

## 2022-06-08 ENCOUNTER — ANTI-COAG VISIT (OUTPATIENT)
Dept: PRIMARY CARE CLINIC | Age: 62
End: 2022-06-08

## 2022-06-08 ENCOUNTER — NURSE ONLY (OUTPATIENT)
Dept: PRIMARY CARE CLINIC | Age: 62
End: 2022-06-08
Payer: COMMERCIAL

## 2022-06-08 DIAGNOSIS — Z95.2 S/P AVR (AORTIC VALVE REPLACEMENT): Primary | ICD-10-CM

## 2022-06-08 LAB
INTERNATIONAL NORMALIZATION RATIO, POC: 3.2
PROTHROMBIN TIME, POC: NORMAL

## 2022-06-08 PROCEDURE — 85610 PROTHROMBIN TIME: CPT | Performed by: NURSE PRACTITIONER

## 2022-06-08 PROCEDURE — 93793 ANTICOAG MGMT PT WARFARIN: CPT | Performed by: NURSE PRACTITIONER

## 2022-06-21 ENCOUNTER — OFFICE VISIT (OUTPATIENT)
Dept: PRIMARY CARE CLINIC | Age: 62
End: 2022-06-21
Payer: COMMERCIAL

## 2022-06-21 VITALS
HEIGHT: 66 IN | WEIGHT: 143 LBS | HEART RATE: 64 BPM | OXYGEN SATURATION: 97 % | RESPIRATION RATE: 16 BRPM | SYSTOLIC BLOOD PRESSURE: 118 MMHG | BODY MASS INDEX: 22.98 KG/M2 | TEMPERATURE: 98.4 F | DIASTOLIC BLOOD PRESSURE: 62 MMHG

## 2022-06-21 DIAGNOSIS — R51.9 INTRACTABLE HEADACHE, UNSPECIFIED CHRONICITY PATTERN, UNSPECIFIED HEADACHE TYPE: ICD-10-CM

## 2022-06-21 DIAGNOSIS — Z72.0 TOBACCO ABUSE: ICD-10-CM

## 2022-06-21 DIAGNOSIS — J30.9 ALLERGIC RHINITIS, UNSPECIFIED SEASONALITY, UNSPECIFIED TRIGGER: ICD-10-CM

## 2022-06-21 DIAGNOSIS — F34.1 DYSTHYMIA: ICD-10-CM

## 2022-06-21 DIAGNOSIS — Z12.31 ENCOUNTER FOR SCREENING MAMMOGRAM FOR MALIGNANT NEOPLASM OF BREAST: ICD-10-CM

## 2022-06-21 DIAGNOSIS — Z95.2 S/P AVR (AORTIC VALVE REPLACEMENT): Primary | ICD-10-CM

## 2022-06-21 DIAGNOSIS — E78.5 HYPERLIPIDEMIA, UNSPECIFIED HYPERLIPIDEMIA TYPE: ICD-10-CM

## 2022-06-21 LAB
INTERNATIONAL NORMALIZATION RATIO, POC: 3.8
PROTHROMBIN TIME, POC: 45.3

## 2022-06-21 PROCEDURE — G8420 CALC BMI NORM PARAMETERS: HCPCS | Performed by: NURSE PRACTITIONER

## 2022-06-21 PROCEDURE — 3017F COLORECTAL CA SCREEN DOC REV: CPT | Performed by: NURSE PRACTITIONER

## 2022-06-21 PROCEDURE — 85610 PROTHROMBIN TIME: CPT | Performed by: NURSE PRACTITIONER

## 2022-06-21 PROCEDURE — G8427 DOCREV CUR MEDS BY ELIG CLIN: HCPCS | Performed by: NURSE PRACTITIONER

## 2022-06-21 PROCEDURE — 4004F PT TOBACCO SCREEN RCVD TLK: CPT | Performed by: NURSE PRACTITIONER

## 2022-06-21 PROCEDURE — 99214 OFFICE O/P EST MOD 30 MIN: CPT | Performed by: NURSE PRACTITIONER

## 2022-06-21 RX ORDER — WARFARIN SODIUM 5 MG/1
5 TABLET ORAL DAILY
Qty: 90 TABLET | Refills: 1 | Status: SHIPPED
Start: 2022-06-21 | End: 2022-10-21 | Stop reason: SDUPTHER

## 2022-06-21 RX ORDER — WARFARIN SODIUM 1 MG/1
1 TABLET ORAL DAILY
Qty: 30 TABLET | Refills: 3 | Status: CANCELLED | OUTPATIENT
Start: 2022-06-21

## 2022-06-21 RX ORDER — SUMATRIPTAN 25 MG/1
25 TABLET, FILM COATED ORAL DAILY PRN
Qty: 9 TABLET | Refills: 0 | Status: SHIPPED | OUTPATIENT
Start: 2022-06-21

## 2022-06-21 RX ORDER — NICOTINE 21 MG/24HR
1 PATCH, TRANSDERMAL 24 HOURS TRANSDERMAL EVERY 24 HOURS
Qty: 30 PATCH | Refills: 3 | Status: CANCELLED | OUTPATIENT
Start: 2022-06-21 | End: 2023-06-21

## 2022-06-21 RX ORDER — ATORVASTATIN CALCIUM 40 MG/1
40 TABLET, FILM COATED ORAL DAILY
Qty: 90 TABLET | Refills: 1 | Status: SHIPPED
Start: 2022-06-21 | End: 2022-10-21 | Stop reason: SDUPTHER

## 2022-06-21 RX ORDER — CETIRIZINE HYDROCHLORIDE 10 MG/1
10 TABLET ORAL DAILY
Qty: 90 TABLET | Refills: 1 | Status: SHIPPED
Start: 2022-06-21 | End: 2022-10-21 | Stop reason: SDUPTHER

## 2022-06-21 RX ORDER — PAROXETINE HYDROCHLORIDE 20 MG/1
TABLET, FILM COATED ORAL
Qty: 90 TABLET | Refills: 1 | Status: SHIPPED
Start: 2022-06-21 | End: 2022-10-21 | Stop reason: SDUPTHER

## 2022-06-21 ASSESSMENT — ENCOUNTER SYMPTOMS
VOMITING: 0
EYE DISCHARGE: 0
APNEA: 0
SHORTNESS OF BREATH: 0
CHEST TIGHTNESS: 0
FACIAL SWELLING: 0
COLOR CHANGE: 0
VOICE CHANGE: 0
BACK PAIN: 0
DIARRHEA: 0
EYE ITCHING: 0
NAUSEA: 0
COUGH: 0
RHINORRHEA: 0
ABDOMINAL DISTENTION: 0
ABDOMINAL PAIN: 0
CONSTIPATION: 0
WHEEZING: 0

## 2022-06-21 NOTE — PROGRESS NOTES
2022     Rondi Dennis Holter 59 y.o. female    : 1960    Chief Complaint:   Follow-up       History of Present Illness:   Kojo Shea is a 59 y.o. female who presents to the office for follow-up on her chronic problems. She has a pertinent past medical history of an AVR about 30 years ago in [place]. She currently does not follow with cardiology but is establishing in December with Dr. Andre Thomas. She is taking warfarin with a goal INR of 2.5-3.5. INR today is 38. She is noncompliant with warfarin dosing and was previously referred to the coumadin clinic due to sub/supratherapeutic INR's. She declines referral.    Hyperlipidemia- she is currently taking Lipitor without any side effects. She does not exercise on a regular basis. She does attempt to follow a low-salt diet. Anxiety and depression are controlled. Previously we increased her Paxil dosing and she did not tolerate this so she decreased her dose to 20 mg and feels that this is manageable. She does not participate in counseling. She denies any suicidal or homicidal ideations. NPH status post  shunt 4 years ago in [place]-stable. Follows with Dr. Edgardo Durbin. Chronic headaches-follows with Dr. Dinorah Harris. Guzman. She is not up-to-date with health maintenance screenings.     Past Medical History:     Past Medical History:   Diagnosis Date    Anxiety     Depression     Hyperlipidemia        Past Surgical History:   Procedure Laterality Date    AORTIC VALVE REPLACEMENT      OTHER SURGICAL HISTORY      shunt placement     TONSILLECTOMY         Family History   Family history unknown: Yes       Social History     Tobacco Use    Smoking status: Current Every Day Smoker     Packs/day: 0.25     Years: 40.00     Pack years: 10.00     Types: Cigarettes     Start date: 1980    Smokeless tobacco: Never Used    Tobacco comment: Smokes 3-5 cigarettes per day   Substance Use Topics    Alcohol use: Not Currently    Drug use: Never Medications:     Current Outpatient Medications:     warfarin (COUMADIN) 5 MG tablet, Take 1 tablet by mouth daily, Disp: 90 tablet, Rfl: 1    SUMAtriptan (IMITREX) 25 MG tablet, Take 1 tablet by mouth daily as needed for Migraine, Disp: 9 tablet, Rfl: 0    PARoxetine (PAXIL) 20 MG tablet, TAKE 1 TABLET BY MOUTH ONCE DAILY IN THE MORNING, Disp: 90 tablet, Rfl: 1    cetirizine (ZYRTEC ALLERGY) 10 MG tablet, Take 1 tablet by mouth daily, Disp: 90 tablet, Rfl: 1    atorvastatin (LIPITOR) 40 MG tablet, Take 1 tablet by mouth daily, Disp: 90 tablet, Rfl: 1    fluticasone (FLONASE) 50 MCG/ACT nasal spray, , Disp: , Rfl:     MEPOLIZUMAB SC, Inject into the skin, Disp: , Rfl:     EQ PAIN RELIEVER 500 MG tablet, , Disp: , Rfl:     Incontinence Supply Disposable (INCONTINENCE BRIEF LARGE) MISC, 1 each by Does not apply route daily, Disp: 2 Package, Rfl: 3    Allergies   Allergen Reactions    Paroxetine Hcl Diarrhea     Adverse Reaction;     Pcn [Penicillins]     Doxycycline Hyclate Nausea And Vomiting    Trazodone Hcl Headaches     Other reaction(s): HEADACHE         Review of Systems:   Review of Systems   Constitutional: Negative for activity change, appetite change, fatigue, fever and unexpected weight change. HENT: Negative for congestion, facial swelling, mouth sores, postnasal drip, rhinorrhea, sneezing, tinnitus and voice change. Eyes: Negative for discharge and itching. Respiratory: Negative for apnea, cough, chest tightness, shortness of breath and wheezing. Cardiovascular: Negative for chest pain, palpitations and leg swelling. Gastrointestinal: Negative for abdominal distention, abdominal pain, constipation, diarrhea, nausea and vomiting. Endocrine: Negative for cold intolerance and heat intolerance. Genitourinary: Negative for difficulty urinating, dysuria, flank pain, frequency, pelvic pain and urgency.    Musculoskeletal: Negative for back pain, gait problem, joint swelling, myalgias and neck pain. Skin: Negative for color change, pallor, rash and wound. Allergic/Immunologic: Negative for environmental allergies and food allergies. Neurological: Positive for headaches. Negative for dizziness, tremors, seizures, syncope, facial asymmetry, speech difficulty, weakness, light-headedness and numbness. Psychiatric/Behavioral: Negative for agitation, behavioral problems, confusion, decreased concentration, dysphoric mood, sleep disturbance and suicidal ideas. The patient is not nervous/anxious and is not hyperactive. Physical Exam:   Vital Signs:  /62   Pulse 64   Temp 98.4 °F (36.9 °C)   Resp 16   Ht 5' 6\" (1.676 m)   Wt 143 lb (64.9 kg)   SpO2 97%   BMI 23.08 kg/m²    Oxygen Saturation Interpretation: Normal.  Physical Exam  Vitals and nursing note reviewed. Constitutional:       Appearance: Normal appearance. She is normal weight. HENT:      Head: Normocephalic and atraumatic. Right Ear: Tympanic membrane, ear canal and external ear normal.      Left Ear: Tympanic membrane, ear canal and external ear normal.      Nose: Nose normal.      Mouth/Throat:      Mouth: Mucous membranes are moist.      Pharynx: Oropharynx is clear. Eyes:      Extraocular Movements: Extraocular movements intact. Conjunctiva/sclera: Conjunctivae normal.      Pupils: Pupils are equal, round, and reactive to light. Cardiovascular:      Rate and Rhythm: Normal rate and regular rhythm. Pulses:           Dorsalis pedis pulses are 1+ on the right side and 1+ on the left side. Heart sounds: Murmur heard. Comments: Click  Pulmonary:      Effort: Pulmonary effort is normal.      Breath sounds: Normal breath sounds. No wheezing, rhonchi or rales. Abdominal:      General: Bowel sounds are normal. There is no distension. Palpations: Abdomen is soft. Tenderness: There is no abdominal tenderness. There is no rebound.    Musculoskeletal:         General: Normal range of motion. Cervical back: Normal range of motion and neck supple. Right lower leg: No edema. Left lower leg: No edema. Skin:     General: Skin is warm and dry. Capillary Refill: Capillary refill takes less than 2 seconds. Neurological:      General: No focal deficit present. Mental Status: She is alert and oriented to person, place, and time. Psychiatric:         Mood and Affect: Mood normal.         Behavior: Behavior normal.         Thought Content: Thought content normal.         Judgment: Judgment normal.         Health Maintenance:     Health Maintenance   Topic Date Due    Cervical cancer screen  Never done    Breast cancer screen  Never done    COVID-19 Vaccine (3 - Booster for Pfizer series) 09/01/2021    Colorectal Cancer Screen  11/20/2021    Pneumococcal 0-64 years Vaccine (2 - PCV) 12/01/2022    Lipids  03/18/2023    Depression Monitoring  03/18/2023    DTaP/Tdap/Td vaccine (2 - Td or Tdap) 08/16/2027    Flu vaccine  Completed    Shingles vaccine  Completed    Hepatitis C screen  Completed    HIV screen  Completed    Hepatitis A vaccine  Aged Out    Hepatitis B vaccine  Aged Out    Hib vaccine  Aged Out    Meningococcal (ACWY) vaccine  Aged [employer] History   Administered Date(s) Administered    COVID-19, Simplify top, DILUTE for use, 12+ yrs, 30mcg/0.3mL dose 03/10/2021, 04/01/2021    Influenza, Quadv, IM, PF (6 mo and older Fluzone, Flulaval, Fluarix, and 3 yrs and older Afluria) 11/24/2020    Zoster Recombinant (Shingrix) 11/24/2020        Testing: All laboratory and radiology results have been personally reviewed by myself. Labs:  Results for orders placed or performed in visit on 06/21/22   POCT INR   Result Value Ref Range    INR,(POC) 3.8     Prothrombin time,(POC) 45.3         Imaging: All Radiology results interpreted by Radiologist unless otherwise noted. No results found.     Assessment/Plan:   I personally reviewed the patient's allergies, past medical history, medications, and vitals sign. Sybil Zablaa was seen today for follow-up. Diagnoses and all orders for this visit:    S/P AVR (aortic valve replacement)  -     POCT INR  -     warfarin (COUMADIN) 5 MG tablet; Take 1 tablet by mouth daily    Intractable headache, unspecified chronicity pattern, unspecified headache type  -     SUMAtriptan (IMITREX) 25 MG tablet; Take 1 tablet by mouth daily as needed for Migraine    Dysthymia  -     PARoxetine (PAXIL) 20 MG tablet; TAKE 1 TABLET BY MOUTH ONCE DAILY IN THE MORNING    Tobacco abuse    Allergic rhinitis, unspecified seasonality, unspecified trigger  -     cetirizine (ZYRTEC ALLERGY) 10 MG tablet; Take 1 tablet by mouth daily    Hyperlipidemia, unspecified hyperlipidemia type  -     atorvastatin (LIPITOR) 40 MG tablet; Take 1 tablet by mouth daily    Encounter for screening mammogram for malignant neoplasm of breast  -     EDEL DIGITAL SCREEN BILATERAL PER PROTOCOL; Future      INR today is 3.8. Decrease tonight's dose to 2.5 and then continue 5 mg daily. Recheck in 2 weeks. Decrease Paxil to 20 mg daily. Continue all medications as prescribed. She does report she did get Cologuard screening and she has not completed this at this time. She is agreeable to complete this and a mammogram.  Orders placed. She currently smoking a half a pack a day, no interest in quitting at this time. Smoking cessation provided. Increase activity and follow a Mediterranean diet. Call or go to ED immediately if symptoms worsen or persist.  Return in about 4 months for follow-up. Sooner if necessary. Counseled regarding above diagnosis, including possible risks and complications,especially if left uncontrolled. Counseled regarding the possible side effects, risks, benefits and alternatives to treatment; patient and/or guardian verbalizes understanding. Advised patient to call with any new medication issues.  All questions answered. Reviewed age and gender appropriate health screening exams and vaccinations. Advised patient regarding importance of keeping up with recommended health maintenance and to schedule as soon as possible if overdue, as this is important in assessing for undiagnosed pathology, especially cancer. Patient verbalizes understanding and agrees.      SIGNATURE: Electronically signed by RANDI Sutton NP on 6/21/2022 at 4:06 PM

## 2022-07-11 ENCOUNTER — HOSPITAL ENCOUNTER (OUTPATIENT)
Dept: MAMMOGRAPHY | Age: 62
Discharge: HOME | End: 2022-07-13

## 2022-07-11 DIAGNOSIS — Z12.31 ENCOUNTER FOR SCREENING MAMMOGRAM FOR MALIGNANT NEOPLASM OF BREAST: ICD-10-CM

## 2022-07-25 ENCOUNTER — ANTI-COAG VISIT (OUTPATIENT)
Dept: PRIMARY CARE CLINIC | Age: 62
End: 2022-07-25

## 2022-07-25 ENCOUNTER — NURSE ONLY (OUTPATIENT)
Dept: PRIMARY CARE CLINIC | Age: 62
End: 2022-07-25
Payer: COMMERCIAL

## 2022-07-25 DIAGNOSIS — Z79.01 ANTICOAGULANT LONG-TERM USE: Primary | ICD-10-CM

## 2022-07-25 LAB
INTERNATIONAL NORMALIZATION RATIO, POC: 1.8
PROTHROMBIN TIME, POC: 32

## 2022-07-25 PROCEDURE — 85610 PROTHROMBIN TIME: CPT | Performed by: NURSE PRACTITIONER

## 2022-07-25 NOTE — PROGRESS NOTES
Per Wendy Jones patient is to take 6 mg Monday Wednesday and Friday and 5mg every other day and follow up in 1 week

## 2022-08-02 ENCOUNTER — NURSE ONLY (OUTPATIENT)
Dept: PRIMARY CARE CLINIC | Age: 62
End: 2022-08-02
Payer: COMMERCIAL

## 2022-08-02 ENCOUNTER — ANTI-COAG VISIT (OUTPATIENT)
Dept: PRIMARY CARE CLINIC | Age: 62
End: 2022-08-02

## 2022-08-02 DIAGNOSIS — Z95.2 S/P AVR (AORTIC VALVE REPLACEMENT): Primary | ICD-10-CM

## 2022-08-02 LAB
INTERNATIONAL NORMALIZATION RATIO, POC: 3.4
PROTHROMBIN TIME, POC: 0

## 2022-08-02 PROCEDURE — 85610 PROTHROMBIN TIME: CPT | Performed by: NURSE PRACTITIONER

## 2022-09-09 ENCOUNTER — NURSE ONLY (OUTPATIENT)
Dept: PRIMARY CARE CLINIC | Age: 62
End: 2022-09-09
Payer: COMMERCIAL

## 2022-09-09 DIAGNOSIS — Z79.01 ANTICOAGULANT LONG-TERM USE: Primary | ICD-10-CM

## 2022-09-09 LAB
INTERNATIONAL NORMALIZATION RATIO, POC: 4.1
PROTHROMBIN TIME, POC: 49.5

## 2022-09-09 PROCEDURE — 93793 ANTICOAG MGMT PT WARFARIN: CPT | Performed by: NURSE PRACTITIONER

## 2022-09-09 PROCEDURE — 85610 PROTHROMBIN TIME: CPT | Performed by: NURSE PRACTITIONER

## 2022-09-26 ENCOUNTER — ANTI-COAG VISIT (OUTPATIENT)
Dept: PRIMARY CARE CLINIC | Age: 62
End: 2022-09-26

## 2022-09-26 ENCOUNTER — NURSE ONLY (OUTPATIENT)
Dept: PRIMARY CARE CLINIC | Age: 62
End: 2022-09-26
Payer: COMMERCIAL

## 2022-09-26 DIAGNOSIS — Z95.2 S/P AVR (AORTIC VALVE REPLACEMENT): Primary | ICD-10-CM

## 2022-09-26 LAB
INTERNATIONAL NORMALIZATION RATIO, POC: 3
PROTHROMBIN TIME, POC: 16

## 2022-09-26 PROCEDURE — 85610 PROTHROMBIN TIME: CPT | Performed by: NURSE PRACTITIONER

## 2022-09-26 PROCEDURE — 93793 ANTICOAG MGMT PT WARFARIN: CPT | Performed by: NURSE PRACTITIONER

## 2022-09-26 NOTE — PROGRESS NOTES
Patient is currently taking 6 mg Monday Wednesday and Friday. She is taking 5 mg all other days of the week.  She denies any missed doses or abnormal bleeding or bruising

## 2022-10-17 ENCOUNTER — NURSE ONLY (OUTPATIENT)
Dept: PRIMARY CARE CLINIC | Age: 62
End: 2022-10-17
Payer: COMMERCIAL

## 2022-10-17 DIAGNOSIS — Z79.01 ANTICOAGULANT LONG-TERM USE: Primary | ICD-10-CM

## 2022-10-17 LAB
INTERNATIONAL NORMALIZATION RATIO, POC: 3.4
PROTHROMBIN TIME, POC: 40.4

## 2022-10-17 PROCEDURE — 85610 PROTHROMBIN TIME: CPT | Performed by: NURSE PRACTITIONER

## 2022-10-17 PROCEDURE — 93793 ANTICOAG MGMT PT WARFARIN: CPT | Performed by: NURSE PRACTITIONER

## 2022-10-21 ENCOUNTER — OFFICE VISIT (OUTPATIENT)
Dept: PRIMARY CARE CLINIC | Age: 62
End: 2022-10-21
Payer: COMMERCIAL

## 2022-10-21 VITALS
TEMPERATURE: 97.9 F | SYSTOLIC BLOOD PRESSURE: 134 MMHG | BODY MASS INDEX: 23.08 KG/M2 | HEART RATE: 90 BPM | DIASTOLIC BLOOD PRESSURE: 80 MMHG | OXYGEN SATURATION: 97 % | WEIGHT: 143 LBS

## 2022-10-21 DIAGNOSIS — J32.9 SINOBRONCHITIS: ICD-10-CM

## 2022-10-21 DIAGNOSIS — Z79.01 ANTICOAGULANT LONG-TERM USE: ICD-10-CM

## 2022-10-21 DIAGNOSIS — F34.1 DYSTHYMIA: ICD-10-CM

## 2022-10-21 DIAGNOSIS — Z98.2 S/P VP SHUNT: ICD-10-CM

## 2022-10-21 DIAGNOSIS — Z12.11 SCREENING FOR COLON CANCER: ICD-10-CM

## 2022-10-21 DIAGNOSIS — F32.A DEPRESSION, UNSPECIFIED DEPRESSION TYPE: ICD-10-CM

## 2022-10-21 DIAGNOSIS — E78.5 HYPERLIPIDEMIA, UNSPECIFIED HYPERLIPIDEMIA TYPE: ICD-10-CM

## 2022-10-21 DIAGNOSIS — Z95.2 S/P AVR (AORTIC VALVE REPLACEMENT): Primary | ICD-10-CM

## 2022-10-21 DIAGNOSIS — F17.210 CIGARETTE SMOKER: ICD-10-CM

## 2022-10-21 DIAGNOSIS — J30.9 ALLERGIC RHINITIS, UNSPECIFIED SEASONALITY, UNSPECIFIED TRIGGER: ICD-10-CM

## 2022-10-21 DIAGNOSIS — J40 SINOBRONCHITIS: ICD-10-CM

## 2022-10-21 DIAGNOSIS — G91.2 NPH (NORMAL PRESSURE HYDROCEPHALUS) (HCC): ICD-10-CM

## 2022-10-21 DIAGNOSIS — Z12.31 ENCOUNTER FOR SCREENING MAMMOGRAM FOR MALIGNANT NEOPLASM OF BREAST: ICD-10-CM

## 2022-10-21 PROCEDURE — G8427 DOCREV CUR MEDS BY ELIG CLIN: HCPCS | Performed by: NURSE PRACTITIONER

## 2022-10-21 PROCEDURE — 99214 OFFICE O/P EST MOD 30 MIN: CPT | Performed by: NURSE PRACTITIONER

## 2022-10-21 PROCEDURE — 3017F COLORECTAL CA SCREEN DOC REV: CPT | Performed by: NURSE PRACTITIONER

## 2022-10-21 PROCEDURE — 4004F PT TOBACCO SCREEN RCVD TLK: CPT | Performed by: NURSE PRACTITIONER

## 2022-10-21 PROCEDURE — G8484 FLU IMMUNIZE NO ADMIN: HCPCS | Performed by: NURSE PRACTITIONER

## 2022-10-21 PROCEDURE — G8420 CALC BMI NORM PARAMETERS: HCPCS | Performed by: NURSE PRACTITIONER

## 2022-10-21 RX ORDER — CETIRIZINE HYDROCHLORIDE 10 MG/1
10 TABLET ORAL DAILY
Qty: 90 TABLET | Refills: 1 | Status: SHIPPED | OUTPATIENT
Start: 2022-10-21

## 2022-10-21 RX ORDER — PAROXETINE HYDROCHLORIDE 20 MG/1
TABLET, FILM COATED ORAL
Qty: 90 TABLET | Refills: 1 | Status: SHIPPED | OUTPATIENT
Start: 2022-10-21

## 2022-10-21 RX ORDER — CEFDINIR 300 MG/1
300 CAPSULE ORAL 2 TIMES DAILY
Qty: 20 CAPSULE | Refills: 0 | Status: SHIPPED | OUTPATIENT
Start: 2022-10-21 | End: 2022-10-31

## 2022-10-21 RX ORDER — METHYLPREDNISOLONE 4 MG/1
TABLET ORAL
Qty: 1 KIT | Refills: 0 | Status: SHIPPED | OUTPATIENT
Start: 2022-10-21 | End: 2022-10-27

## 2022-10-21 RX ORDER — BENZONATATE 100 MG/1
100 CAPSULE ORAL 3 TIMES DAILY PRN
Qty: 30 CAPSULE | Refills: 0 | Status: SHIPPED | OUTPATIENT
Start: 2022-10-21 | End: 2022-10-31

## 2022-10-21 RX ORDER — ATORVASTATIN CALCIUM 40 MG/1
40 TABLET, FILM COATED ORAL DAILY
Qty: 90 TABLET | Refills: 1 | Status: SHIPPED | OUTPATIENT
Start: 2022-10-21

## 2022-10-21 RX ORDER — WARFARIN SODIUM 5 MG/1
5 TABLET ORAL DAILY
Qty: 90 TABLET | Refills: 1 | Status: SHIPPED | OUTPATIENT
Start: 2022-10-21

## 2022-10-21 SDOH — ECONOMIC STABILITY: FOOD INSECURITY: WITHIN THE PAST 12 MONTHS, YOU WORRIED THAT YOUR FOOD WOULD RUN OUT BEFORE YOU GOT MONEY TO BUY MORE.: NEVER TRUE

## 2022-10-21 SDOH — ECONOMIC STABILITY: FOOD INSECURITY: WITHIN THE PAST 12 MONTHS, THE FOOD YOU BOUGHT JUST DIDN'T LAST AND YOU DIDN'T HAVE MONEY TO GET MORE.: NEVER TRUE

## 2022-10-21 ASSESSMENT — ENCOUNTER SYMPTOMS
SHORTNESS OF BREATH: 0
WHEEZING: 0
NAUSEA: 0
COUGH: 1
COLOR CHANGE: 0
ABDOMINAL DISTENTION: 0
ABDOMINAL PAIN: 0
VOMITING: 0
BACK PAIN: 0
APNEA: 0
RHINORRHEA: 1
SINUS PRESSURE: 1
DIARRHEA: 0
FACIAL SWELLING: 0
VOICE CHANGE: 0
CHEST TIGHTNESS: 0
EYE ITCHING: 0
CONSTIPATION: 0
EYE DISCHARGE: 0

## 2022-10-21 ASSESSMENT — SOCIAL DETERMINANTS OF HEALTH (SDOH): HOW HARD IS IT FOR YOU TO PAY FOR THE VERY BASICS LIKE FOOD, HOUSING, MEDICAL CARE, AND HEATING?: NOT HARD AT ALL

## 2022-10-21 NOTE — PROGRESS NOTES
2022     Levora Gather Holter 59 y.o. female    : 1960    Chief Complaint:   Hyperlipidemia and Cough (Patient states she has brochitis)       History of Present Illness:   Tamara Bauer is a 59 y.o. female who presents to the office for follow-up on her chronic problems. She has a pertinent past medical history of an AVR about 30 years ago in [place]. She has an appointment with cardiology in December, Dr. Ann Neumann. She is taking warfarin with a goal INR of 2.5-3.5. Most recent INR 3.4. She is noncompliant with warfarin dosing and was previously referred to the coumadin clinic due to sub/supratherapeutic INR's. She declines referral, however, she has been therapeutic the last few INR checks. Hyperlipidemia- she is currently taking Lipitor without any side effects. She does not exercise on a regular basis. She does attempt to follow a low-salt diet. Anxiety and depression are controlled. Previously we increased her Paxil dosing and she did not tolerate this so she decreased her dose to 20 mg and feels that this is manageable. She does not participate in counseling. She denies any suicidal or homicidal ideations. NPH status post  shunt 5 years ago in [place]. Follows with Dr. Anh Cantu. Chronic headaches/trigeminal neuralgia-follows with Dr. Mary Sweneey. Stable. She has an additional complaint of nasal congestion, rhinorrhea, chest congestion and productive cough that has been ongoing x2 weeks. She is a current everyday smoker smoking about a half a pack of cigarettes a day. She has tried nothing over-the-counter for symptomatic relief. She denies any fever, chills, chest pain or shortness of breath. She did not take any at home COVID-19 test.  She is fully vaccinated for COVID-19. She is not up-to-date with health maintenance screenings.     Past Medical History:     Past Medical History:   Diagnosis Date    Anxiety     Depression     Hyperlipidemia        Past Surgical History: Procedure Laterality Date    AORTIC VALVE REPLACEMENT  1982    OTHER SURGICAL HISTORY      shunt placement     TONSILLECTOMY         Family History   Family history unknown: Yes       Social History     Tobacco Use    Smoking status: Every Day     Packs/day: 0.25     Years: 40.00     Pack years: 10.00     Types: Cigarettes     Start date: 8/4/1980    Smokeless tobacco: Never    Tobacco comments:     Smokes 3-5 cigarettes per day   Substance Use Topics    Alcohol use: Not Currently    Drug use: Never       Medications:     Current Outpatient Medications:     warfarin (COUMADIN) 5 MG tablet, Take 1 tablet by mouth daily, Disp: 90 tablet, Rfl: 1    PARoxetine (PAXIL) 20 MG tablet, TAKE 1 TABLET BY MOUTH ONCE DAILY IN THE MORNING, Disp: 90 tablet, Rfl: 1    cetirizine (ZYRTEC ALLERGY) 10 MG tablet, Take 1 tablet by mouth daily, Disp: 90 tablet, Rfl: 1    atorvastatin (LIPITOR) 40 MG tablet, Take 1 tablet by mouth daily, Disp: 90 tablet, Rfl: 1    benzonatate (TESSALON) 100 MG capsule, Take 1 capsule by mouth 3 times daily as needed for Cough, Disp: 30 capsule, Rfl: 0    cefdinir (OMNICEF) 300 MG capsule, Take 1 capsule by mouth 2 times daily for 10 days, Disp: 20 capsule, Rfl: 0    methylPREDNISolone (MEDROL DOSEPACK) 4 MG tablet, Take by mouth., Disp: 1 kit, Rfl: 0    SUMAtriptan (IMITREX) 25 MG tablet, Take 1 tablet by mouth daily as needed for Migraine, Disp: 9 tablet, Rfl: 0    fluticasone (FLONASE) 50 MCG/ACT nasal spray, , Disp: , Rfl:     EQ PAIN RELIEVER 500 MG tablet, , Disp: , Rfl:     MEPOLIZUMAB SC, Inject into the skin, Disp: , Rfl:     Incontinence Supply Disposable (INCONTINENCE BRIEF LARGE) MISC, 1 each by Does not apply route daily, Disp: 2 Package, Rfl: 3    Allergies   Allergen Reactions    Paroxetine Hcl Diarrhea     Adverse Reaction;     Pcn [Penicillins]     Doxycycline Hyclate Nausea And Vomiting    Trazodone Hcl Headaches     Other reaction(s): HEADACHE         Review of Systems:   Review of Systems   Constitutional:  Negative for activity change, appetite change, fatigue, fever and unexpected weight change. HENT:  Positive for congestion, postnasal drip, rhinorrhea and sinus pressure. Negative for facial swelling, mouth sores, sneezing, tinnitus and voice change. Eyes:  Negative for discharge and itching. Respiratory:  Positive for cough. Negative for apnea, chest tightness, shortness of breath and wheezing. Cardiovascular:  Negative for chest pain, palpitations and leg swelling. Gastrointestinal:  Negative for abdominal distention, abdominal pain, constipation, diarrhea, nausea and vomiting. Endocrine: Negative for cold intolerance and heat intolerance. Genitourinary:  Negative for difficulty urinating, dysuria, flank pain, frequency, pelvic pain and urgency. Musculoskeletal:  Negative for back pain, gait problem, joint swelling, myalgias and neck pain. Skin:  Negative for color change, pallor, rash and wound. Allergic/Immunologic: Negative for environmental allergies and food allergies. Neurological:  Positive for headaches. Negative for dizziness, tremors, seizures, syncope, facial asymmetry, speech difficulty, weakness, light-headedness and numbness. Improved   Psychiatric/Behavioral:  Negative for agitation, behavioral problems, confusion, decreased concentration, dysphoric mood, sleep disturbance and suicidal ideas. The patient is not nervous/anxious and is not hyperactive. Physical Exam:   Vital Signs:  /80   Pulse 90   Temp 97.9 °F (36.6 °C)   Wt 143 lb (64.9 kg)   SpO2 97%   BMI 23.08 kg/m²    Oxygen Saturation Interpretation: Normal.  Physical Exam  Vitals and nursing note reviewed. Constitutional:       Appearance: Normal appearance. She is normal weight. HENT:      Head: Normocephalic and atraumatic.       Right Ear: Tympanic membrane, ear canal and external ear normal.      Left Ear: Tympanic membrane, ear canal and external ear normal. Nose: Nose normal.      Mouth/Throat:      Mouth: Mucous membranes are moist.      Pharynx: Oropharynx is clear. Eyes:      Extraocular Movements: Extraocular movements intact. Conjunctiva/sclera: Conjunctivae normal.      Pupils: Pupils are equal, round, and reactive to light. Cardiovascular:      Rate and Rhythm: Normal rate and regular rhythm. Pulses:           Dorsalis pedis pulses are 1+ on the right side and 1+ on the left side. Heart sounds: Murmur heard. Comments: Click  Pulmonary:      Effort: Pulmonary effort is normal.      Breath sounds: Normal breath sounds. No wheezing, rhonchi or rales. Abdominal:      General: Bowel sounds are normal. There is no distension. Palpations: Abdomen is soft. Tenderness: There is no abdominal tenderness. There is no rebound. Musculoskeletal:         General: Normal range of motion. Cervical back: Normal range of motion and neck supple. Right lower leg: No edema. Left lower leg: No edema. Skin:     General: Skin is warm and dry. Capillary Refill: Capillary refill takes less than 2 seconds. Neurological:      General: No focal deficit present. Mental Status: She is alert and oriented to person, place, and time. Psychiatric:         Mood and Affect: Mood normal.         Behavior: Behavior normal.         Thought Content:  Thought content normal.         Judgment: Judgment normal.       Health Maintenance:     Health Maintenance   Topic Date Due    Cervical cancer screen  Never done    Breast cancer screen  Never done    COVID-19 Vaccine (3 - Booster for Pfizer series) 05/27/2021    Colorectal Cancer Screen  11/20/2021    Flu vaccine (1) 08/01/2022    Pneumococcal 0-64 years Vaccine (2 - PCV) 12/01/2022    Lipids  03/18/2023    Depression Monitoring  03/18/2023    DTaP/Tdap/Td vaccine (2 - Td or Tdap) 08/16/2027    Shingles vaccine  Completed    Hepatitis C screen  Completed    HIV screen  Completed Hepatitis A vaccine  Aged Out    Hib vaccine  Aged Out    Meningococcal (ACWY) vaccine  Aged [employer] History   Administered Date(s) Administered    COVID-19, PFIZER PURPLE top, DILUTE for use, (age 15 y+), 30mcg/0.3mL 03/10/2021, 04/01/2021    Influenza, FLUARIX, FLULAVAL, FLUZONE (age 10 mo+) AND AFLURIA, (age 1 y+), PF, 0.5mL 11/24/2020    Zoster Recombinant (Shingrix) 11/24/2020        Testing: All laboratory and radiology results have been personally reviewed by myself. Labs:  No results found for this visit on 10/21/22. Imaging: All Radiology results interpreted by Radiologist unless otherwise noted. No results found. Assessment/Plan:   I personally reviewed the patient's allergies, past medical history, medications, and vitals sign. Lucie Araujo was seen today for hyperlipidemia and cough. Diagnoses and all orders for this visit:    S/P AVR (aortic valve replacement)  -     warfarin (COUMADIN) 5 MG tablet; Take 1 tablet by mouth daily  -     External Referral To Cardiology    Dysthymia  -     PARoxetine (PAXIL) 20 MG tablet; TAKE 1 TABLET BY MOUTH ONCE DAILY IN THE MORNING    Allergic rhinitis, unspecified seasonality, unspecified trigger  -     cetirizine (ZYRTEC ALLERGY) 10 MG tablet; Take 1 tablet by mouth daily    Hyperlipidemia, unspecified hyperlipidemia type  -     atorvastatin (LIPITOR) 40 MG tablet; Take 1 tablet by mouth daily  -     External Referral To Cardiology    Anticoagulant long-term use  -     External Referral To Cardiology    Screening for colon cancer  -     Fecal DNA Colorectal cancer screening (Cologuard)    Encounter for screening mammogram for malignant neoplasm of breast  -     EDEL DIGITAL SCREEN BILATERAL PER PROTOCOL; Future    Cigarette smoker  -     CT Lung Screen (Initial/Annual); Future    Sinobronchitis  -     benzonatate (TESSALON) 100 MG capsule;  Take 1 capsule by mouth 3 times daily as needed for Cough  -     cefdinir (OMNICEF) 300 MG capsule; Take 1 capsule by mouth 2 times daily for 10 days  -     methylPREDNISolone (MEDROL DOSEPACK) 4 MG tablet; Take by mouth. Depression, unspecified depression type    S/P  shunt    NPH (normal pressure hydrocephalus) (HCC)    Continue all medications as prescribed, side effects discussed. Patient is agreeable to complete Cologuard test, mammogram and CT lung screening, these orders were placed today. Recheck INR as previously planned. INR goal 2.5-3.5. Symptoms are consistent with sinobronchitis, will start occasional cefdinir, Medrol Dosepak and benzonatate, side effects administration instructions discussed. She currently smoking a half a pack a day, no interest in quitting at this time. Smoking cessation provided. Increase activity and follow a Mediterranean diet. Call or go to ED immediately if symptoms worsen or persist.  Return in about 4 months for follow-up. Sooner if necessary. Counseled regarding above diagnosis, including possible risks and complications,especially if left uncontrolled. Counseled regarding the possible side effects, risks, benefits and alternatives to treatment; patient and/or guardian verbalizes understanding. Advised patient to call with any new medication issues. All questions answered. Reviewed age and gender appropriate health screening exams and vaccinations. Advised patient regarding importance of keeping up with recommended health maintenance and to schedule as soon as possible if overdue, as this is important in assessing for undiagnosed pathology, especially cancer. Patient verbalizes understanding and agrees.      SIGNATURE: Electronically signed by RANDI Conde NP on 10/21/2022 at 2:46 PM

## 2022-10-31 ENCOUNTER — NURSE ONLY (OUTPATIENT)
Dept: PRIMARY CARE CLINIC | Age: 62
End: 2022-10-31
Payer: COMMERCIAL

## 2022-10-31 ENCOUNTER — TELEPHONE (OUTPATIENT)
Dept: FAMILY MEDICINE CLINIC | Age: 62
End: 2022-10-31

## 2022-10-31 DIAGNOSIS — Z95.2 S/P AVR (AORTIC VALVE REPLACEMENT): Primary | ICD-10-CM

## 2022-10-31 LAB
INTERNATIONAL NORMALIZATION RATIO, POC: 4.7
PROTHROMBIN TIME, POC: 56.9

## 2022-10-31 PROCEDURE — 85610 PROTHROMBIN TIME: CPT | Performed by: NURSE PRACTITIONER

## 2022-10-31 PROCEDURE — 93793 ANTICOAG MGMT PT WARFARIN: CPT | Performed by: NURSE PRACTITIONER

## 2022-11-01 RX ORDER — ALBUTEROL SULFATE 90 UG/1
2 AEROSOL, METERED RESPIRATORY (INHALATION) 4 TIMES DAILY PRN
Qty: 18 G | Refills: 0 | Status: SHIPPED | OUTPATIENT
Start: 2022-11-01

## 2022-11-02 ENCOUNTER — NURSE ONLY (OUTPATIENT)
Dept: PRIMARY CARE CLINIC | Age: 62
End: 2022-11-02
Payer: COMMERCIAL

## 2022-11-02 DIAGNOSIS — Z95.2 S/P AVR (AORTIC VALVE REPLACEMENT): Primary | ICD-10-CM

## 2022-11-02 LAB
INTERNATIONAL NORMALIZATION RATIO, POC: 2.1
PROTHROMBIN TIME, POC: 25.6

## 2022-11-02 PROCEDURE — 93793 ANTICOAG MGMT PT WARFARIN: CPT | Performed by: NURSE PRACTITIONER

## 2022-11-02 PROCEDURE — 85610 PROTHROMBIN TIME: CPT | Performed by: NURSE PRACTITIONER

## 2022-11-06 DIAGNOSIS — J32.9 SINOBRONCHITIS: ICD-10-CM

## 2022-11-06 DIAGNOSIS — J40 SINOBRONCHITIS: ICD-10-CM

## 2022-11-07 RX ORDER — BENZONATATE 100 MG/1
CAPSULE ORAL
Qty: 30 CAPSULE | Refills: 0 | Status: SHIPPED | OUTPATIENT
Start: 2022-11-07

## 2022-11-07 NOTE — TELEPHONE ENCOUNTER
Last Appointment:  10/21/2022  Future Appointments   Date Time Provider Ehsan Mack   12/6/2022 10:00 AM Ming Wang MD [facility]   2/24/2023  2:30 PM Petar King

## 2022-11-08 ENCOUNTER — NURSE ONLY (OUTPATIENT)
Dept: PRIMARY CARE CLINIC | Age: 62
End: 2022-11-08
Payer: COMMERCIAL

## 2022-11-08 DIAGNOSIS — Z79.01 ANTICOAGULANT LONG-TERM USE: Primary | ICD-10-CM

## 2022-11-08 LAB
INTERNATIONAL NORMALIZATION RATIO, POC: 3.7
PROTHROMBIN TIME, POC: 44.5

## 2022-11-08 PROCEDURE — 93793 ANTICOAG MGMT PT WARFARIN: CPT | Performed by: NURSE PRACTITIONER

## 2022-11-08 PROCEDURE — 85610 PROTHROMBIN TIME: CPT | Performed by: NURSE PRACTITIONER

## 2022-11-22 ENCOUNTER — NURSE ONLY (OUTPATIENT)
Dept: PRIMARY CARE CLINIC | Age: 62
End: 2022-11-22
Payer: COMMERCIAL

## 2022-11-22 DIAGNOSIS — Z79.01 ANTICOAGULANT LONG-TERM USE: Primary | ICD-10-CM

## 2022-11-22 LAB
INTERNATIONAL NORMALIZATION RATIO, POC: 4.6
PROTHROMBIN TIME, POC: 55.7

## 2022-11-22 PROCEDURE — 93793 ANTICOAG MGMT PT WARFARIN: CPT | Performed by: NURSE PRACTITIONER

## 2022-11-22 PROCEDURE — 85610 PROTHROMBIN TIME: CPT | Performed by: NURSE PRACTITIONER

## 2022-12-05 ENCOUNTER — NURSE ONLY (OUTPATIENT)
Dept: PRIMARY CARE CLINIC | Age: 62
End: 2022-12-05
Payer: COMMERCIAL

## 2022-12-05 DIAGNOSIS — Z79.01 ANTICOAGULANT LONG-TERM USE: Primary | ICD-10-CM

## 2022-12-05 LAB
INTERNATIONAL NORMALIZATION RATIO, POC: 2
PROTHROMBIN TIME, POC: 23.7

## 2022-12-05 PROCEDURE — 85610 PROTHROMBIN TIME: CPT | Performed by: NURSE PRACTITIONER

## 2022-12-14 ENCOUNTER — NURSE ONLY (OUTPATIENT)
Dept: PRIMARY CARE CLINIC | Age: 62
End: 2022-12-14
Payer: COMMERCIAL

## 2022-12-14 DIAGNOSIS — Z79.01 ANTICOAGULANT LONG-TERM USE: Primary | ICD-10-CM

## 2022-12-14 LAB
INTERNATIONAL NORMALIZATION RATIO, POC: 3.3
PROTHROMBIN TIME, POC: 39.4

## 2022-12-14 PROCEDURE — 93793 ANTICOAG MGMT PT WARFARIN: CPT | Performed by: NURSE PRACTITIONER

## 2022-12-14 PROCEDURE — 85610 PROTHROMBIN TIME: CPT | Performed by: NURSE PRACTITIONER

## 2022-12-19 ENCOUNTER — COMMUNITY OUTREACH (OUTPATIENT)
Dept: PRIMARY CARE CLINIC | Age: 62
End: 2022-12-19

## 2022-12-19 NOTE — PROGRESS NOTES
Patient's HM shows they are overdue for Mammogram and Colorectal Screening. Care Everywhere and  files searched. 2019 mammogram results to attach to order and HM updated.

## 2022-12-28 ENCOUNTER — ANTI-COAG VISIT (OUTPATIENT)
Dept: PRIMARY CARE CLINIC | Age: 62
End: 2022-12-28

## 2022-12-28 ENCOUNTER — NURSE ONLY (OUTPATIENT)
Dept: PRIMARY CARE CLINIC | Age: 62
End: 2022-12-28
Payer: COMMERCIAL

## 2022-12-28 DIAGNOSIS — Z95.2 S/P AVR (AORTIC VALVE REPLACEMENT): Primary | ICD-10-CM

## 2022-12-28 LAB
INTERNATIONAL NORMALIZATION RATIO, POC: 4.2
PROTHROMBIN TIME, POC: 12

## 2022-12-28 PROCEDURE — 93793 ANTICOAG MGMT PT WARFARIN: CPT | Performed by: NURSE PRACTITIONER

## 2022-12-28 PROCEDURE — 85610 PROTHROMBIN TIME: CPT | Performed by: NURSE PRACTITIONER

## 2023-01-05 ENCOUNTER — NURSE ONLY (OUTPATIENT)
Dept: PRIMARY CARE CLINIC | Age: 63
End: 2023-01-05
Payer: COMMERCIAL

## 2023-01-05 DIAGNOSIS — Z79.01 ANTICOAGULANT LONG-TERM USE: Primary | ICD-10-CM

## 2023-01-05 LAB
INTERNATIONAL NORMALIZATION RATIO, POC: 2.1
PROTHROMBIN TIME, POC: 25.5

## 2023-01-05 PROCEDURE — 85610 PROTHROMBIN TIME: CPT | Performed by: NURSE PRACTITIONER

## 2023-01-05 PROCEDURE — 93793 ANTICOAG MGMT PT WARFARIN: CPT | Performed by: NURSE PRACTITIONER

## 2023-01-27 ENCOUNTER — NURSE ONLY (OUTPATIENT)
Dept: PRIMARY CARE CLINIC | Age: 63
End: 2023-01-27
Payer: COMMERCIAL

## 2023-01-27 DIAGNOSIS — Z95.2 S/P AVR (AORTIC VALVE REPLACEMENT): Primary | ICD-10-CM

## 2023-01-27 DIAGNOSIS — Z79.01 ANTICOAGULANT LONG-TERM USE: ICD-10-CM

## 2023-01-27 LAB
INTERNATIONAL NORMALIZATION RATIO, POC: 5.2
PROTHROMBIN TIME, POC: 62.4

## 2023-01-27 PROCEDURE — 85610 PROTHROMBIN TIME: CPT | Performed by: NURSE PRACTITIONER

## 2023-01-27 PROCEDURE — 93793 ANTICOAG MGMT PT WARFARIN: CPT | Performed by: NURSE PRACTITIONER

## 2023-02-14 ENCOUNTER — NURSE ONLY (OUTPATIENT)
Dept: PRIMARY CARE CLINIC | Age: 63
End: 2023-02-14

## 2023-02-14 DIAGNOSIS — Z79.01 ANTICOAGULANT LONG-TERM USE: Primary | ICD-10-CM

## 2023-02-14 LAB
INTERNATIONAL NORMALIZATION RATIO, POC: 2
PROTHROMBIN TIME, POC: 24.1

## 2023-02-24 ENCOUNTER — OFFICE VISIT (OUTPATIENT)
Dept: PRIMARY CARE CLINIC | Age: 63
End: 2023-02-24

## 2023-02-24 VITALS
BODY MASS INDEX: 22.66 KG/M2 | SYSTOLIC BLOOD PRESSURE: 132 MMHG | WEIGHT: 141 LBS | HEART RATE: 90 BPM | RESPIRATION RATE: 16 BRPM | DIASTOLIC BLOOD PRESSURE: 60 MMHG | HEIGHT: 66 IN | OXYGEN SATURATION: 98 %

## 2023-02-24 DIAGNOSIS — G91.2 NPH (NORMAL PRESSURE HYDROCEPHALUS) (HCC): ICD-10-CM

## 2023-02-24 DIAGNOSIS — F17.210 CIGARETTE SMOKER: ICD-10-CM

## 2023-02-24 DIAGNOSIS — J40 SINOBRONCHITIS: ICD-10-CM

## 2023-02-24 DIAGNOSIS — Z98.2 S/P VP SHUNT: ICD-10-CM

## 2023-02-24 DIAGNOSIS — Z12.31 ENCOUNTER FOR SCREENING MAMMOGRAM FOR MALIGNANT NEOPLASM OF BREAST: ICD-10-CM

## 2023-02-24 DIAGNOSIS — J30.9 ALLERGIC RHINITIS, UNSPECIFIED SEASONALITY, UNSPECIFIED TRIGGER: ICD-10-CM

## 2023-02-24 DIAGNOSIS — H93.13 TINNITUS OF BOTH EARS: ICD-10-CM

## 2023-02-24 DIAGNOSIS — F32.5 MAJOR DEPRESSIVE DISORDER WITH SINGLE EPISODE, IN FULL REMISSION (HCC): Primary | ICD-10-CM

## 2023-02-24 DIAGNOSIS — Z79.01 ANTICOAGULANT LONG-TERM USE: ICD-10-CM

## 2023-02-24 DIAGNOSIS — R51.9 INTRACTABLE HEADACHE, UNSPECIFIED CHRONICITY PATTERN, UNSPECIFIED HEADACHE TYPE: ICD-10-CM

## 2023-02-24 DIAGNOSIS — E78.5 HYPERLIPIDEMIA, UNSPECIFIED HYPERLIPIDEMIA TYPE: ICD-10-CM

## 2023-02-24 DIAGNOSIS — J32.9 SINOBRONCHITIS: ICD-10-CM

## 2023-02-24 DIAGNOSIS — Z12.11 SCREENING FOR COLON CANCER: ICD-10-CM

## 2023-02-24 DIAGNOSIS — F34.1 DYSTHYMIA: ICD-10-CM

## 2023-02-24 DIAGNOSIS — Z95.2 S/P AVR (AORTIC VALVE REPLACEMENT): ICD-10-CM

## 2023-02-24 LAB
INTERNATIONAL NORMALIZATION RATIO, POC: 3.6
PROTHROMBIN TIME, POC: 42.9

## 2023-02-24 RX ORDER — PAROXETINE HYDROCHLORIDE 20 MG/1
TABLET, FILM COATED ORAL
Qty: 90 TABLET | Refills: 1 | Status: SHIPPED | OUTPATIENT
Start: 2023-02-24

## 2023-02-24 RX ORDER — SUMATRIPTAN 25 MG/1
25 TABLET, FILM COATED ORAL DAILY PRN
Qty: 9 TABLET | Refills: 2 | Status: SHIPPED | OUTPATIENT
Start: 2023-02-24

## 2023-02-24 RX ORDER — METHYLPREDNISOLONE 4 MG/1
TABLET ORAL
Qty: 1 KIT | Refills: 0 | Status: SHIPPED | OUTPATIENT
Start: 2023-02-24

## 2023-02-24 RX ORDER — BENZONATATE 100 MG/1
CAPSULE ORAL
Qty: 30 CAPSULE | Refills: 0 | Status: CANCELLED | OUTPATIENT
Start: 2023-02-24

## 2023-02-24 RX ORDER — WARFARIN SODIUM 5 MG/1
5 TABLET ORAL DAILY
Qty: 90 TABLET | Refills: 1 | Status: SHIPPED | OUTPATIENT
Start: 2023-02-24

## 2023-02-24 RX ORDER — ATORVASTATIN CALCIUM 40 MG/1
40 TABLET, FILM COATED ORAL DAILY
Qty: 90 TABLET | Refills: 1 | Status: SHIPPED | OUTPATIENT
Start: 2023-02-24

## 2023-02-24 RX ORDER — ALBUTEROL SULFATE 90 UG/1
2 AEROSOL, METERED RESPIRATORY (INHALATION) 4 TIMES DAILY PRN
Qty: 18 G | Refills: 0 | Status: SHIPPED | OUTPATIENT
Start: 2023-02-24

## 2023-02-24 RX ORDER — CETIRIZINE HYDROCHLORIDE 10 MG/1
10 TABLET ORAL DAILY
Qty: 90 TABLET | Refills: 1 | Status: SHIPPED | OUTPATIENT
Start: 2023-02-24

## 2023-02-24 ASSESSMENT — ENCOUNTER SYMPTOMS
ABDOMINAL DISTENTION: 0
COLOR CHANGE: 0
WHEEZING: 0
BACK PAIN: 0
ABDOMINAL PAIN: 0
COUGH: 0
NAUSEA: 0
CHEST TIGHTNESS: 0
VOICE CHANGE: 0
SHORTNESS OF BREATH: 0
FACIAL SWELLING: 0
DIARRHEA: 0
EYE DISCHARGE: 0
RHINORRHEA: 1
EYE ITCHING: 0
APNEA: 0
VOMITING: 0
SINUS PRESSURE: 0
CONSTIPATION: 0

## 2023-02-24 ASSESSMENT — PATIENT HEALTH QUESTIONNAIRE - PHQ9
5. POOR APPETITE OR OVEREATING: 0
1. LITTLE INTEREST OR PLEASURE IN DOING THINGS: 0
SUM OF ALL RESPONSES TO PHQ9 QUESTIONS 1 & 2: 0
SUM OF ALL RESPONSES TO PHQ QUESTIONS 1-9: 0
3. TROUBLE FALLING OR STAYING ASLEEP: 0
4. FEELING TIRED OR HAVING LITTLE ENERGY: 0
10. IF YOU CHECKED OFF ANY PROBLEMS, HOW DIFFICULT HAVE THESE PROBLEMS MADE IT FOR YOU TO DO YOUR WORK, TAKE CARE OF THINGS AT HOME, OR GET ALONG WITH OTHER PEOPLE: 0
SUM OF ALL RESPONSES TO PHQ QUESTIONS 1-9: 0
7. TROUBLE CONCENTRATING ON THINGS, SUCH AS READING THE NEWSPAPER OR WATCHING TELEVISION: 0
8. MOVING OR SPEAKING SO SLOWLY THAT OTHER PEOPLE COULD HAVE NOTICED. OR THE OPPOSITE, BEING SO FIGETY OR RESTLESS THAT YOU HAVE BEEN MOVING AROUND A LOT MORE THAN USUAL: 0
SUM OF ALL RESPONSES TO PHQ QUESTIONS 1-9: 0
6. FEELING BAD ABOUT YOURSELF - OR THAT YOU ARE A FAILURE OR HAVE LET YOURSELF OR YOUR FAMILY DOWN: 0
2. FEELING DOWN, DEPRESSED OR HOPELESS: 0
SUM OF ALL RESPONSES TO PHQ QUESTIONS 1-9: 0
9. THOUGHTS THAT YOU WOULD BE BETTER OFF DEAD, OR OF HURTING YOURSELF: 0

## 2023-02-24 NOTE — PATIENT INSTRUCTIONS
Please return to the office Monday-Thursday 7:30-4 or Friday 7:30-12 to obtain fasting lab work. No appointment is necessary. You may have water and black coffee, no cream or sugar. You may take your morning medications with a sip of water prior to testing. We will call you with the results. If testing for cortisol or testosterone, labs must be completed between 8am and 10am.    Please note that all lab orders are good for 6 weeks from the order date and will no longer be valid past this date. Please call [facility] scheduling [phone] to set up an appointment if you do not hear anything in the next 3-4 days. You need to schedule CT lung screening.

## 2023-02-24 NOTE — PROGRESS NOTES
2023     Oneita Harpin Holter 57 y.o. female    : 1960    Chief Complaint:   Hypothyroidism       History of Present Illness:   Tegan Carpenter is a 57 y.o. female who presents to the office for follow-up on her chronic problems. She has a pertinent past medical history of an AVR about 30 years ago in [place]. She is here to establish with cardiology. I did place an order for [facility] cardiology and [facility] cardiology. She is taking warfarin with a goal INR of 2.5-3.5. Most recent INR 3.6. She is noncompliant with warfarin dosing and was previously referred to the coumadin clinic due to sub/supratherapeutic INR's. She declines referral.  She denies any abnormal bleeding or bruising. Hyperlipidemia- she is currently taking Lipitor without any side effects. She does not exercise on a regular basis. She does attempt to follow a low-salt diet. LDL 76, total cholesterol 144, triglycerides 122, HDL 44. Anxiety and depression are controlled. Taking Paxil as prescribed she does not participate in counseling. She denies any suicidal or homicidal ideations. NPH status post  shunt 6 years ago in [place]-stable. Follows with Dr. Gaurav Tapia. She has an additional complaint of bilateral ear pain and tinnitus-she has been attempting to avoid Q-tips. She is currently taking Zyrtec and Flonase with no improvement of symptoms. She does report associated rhinorrhea and postnasal drip. She is not up-to-date with health maintenance screenings.     Past Medical History:     Past Medical History:   Diagnosis Date    Anxiety     Depression     Hyperlipidemia        Past Surgical History:   Procedure Laterality Date    AORTIC VALVE REPLACEMENT      OTHER SURGICAL HISTORY      shunt placement     TONSILLECTOMY         Family History   Family history unknown: Yes       Social History     Tobacco Use    Smoking status: Every Day     Packs/day: 0.25     Years: 40.00     Pack years: 10.00     Types: Cigarettes     Start date: 8/4/1980    Smokeless tobacco: Never    Tobacco comments:     Smokes 3-5 cigarettes per day   Substance Use Topics    Alcohol use: Not Currently    Drug use: Never       Medications:     Current Outpatient Medications:     warfarin (COUMADIN) 5 MG tablet, Take 1 tablet by mouth daily, Disp: 90 tablet, Rfl: 1    SUMAtriptan (IMITREX) 25 MG tablet, Take 1 tablet by mouth daily as needed for Migraine, Disp: 9 tablet, Rfl: 2    PARoxetine (PAXIL) 20 MG tablet, TAKE 1 TABLET BY MOUTH ONCE DAILY IN THE MORNING, Disp: 90 tablet, Rfl: 1    cetirizine (ZYRTEC ALLERGY) 10 MG tablet, Take 1 tablet by mouth daily, Disp: 90 tablet, Rfl: 1    atorvastatin (LIPITOR) 40 MG tablet, Take 1 tablet by mouth daily, Disp: 90 tablet, Rfl: 1    albuterol sulfate HFA (VENTOLIN HFA) 108 (90 Base) MCG/ACT inhaler, Inhale 2 puffs into the lungs 4 times daily as needed for Wheezing, Disp: 18 g, Rfl: 0    methylPREDNISolone (MEDROL DOSEPACK) 4 MG tablet, Take by mouth., Disp: 1 kit, Rfl: 0    fluticasone (FLONASE) 50 MCG/ACT nasal spray, , Disp: , Rfl:     EQ PAIN RELIEVER 500 MG tablet, , Disp: , Rfl:     Incontinence Supply Disposable (INCONTINENCE BRIEF LARGE) MISC, 1 each by Does not apply route daily, Disp: 2 Package, Rfl: 3    Allergies   Allergen Reactions    Pcn [Penicillins]     Penicillin G      Other reaction(s): Unknown    Doxycycline Hyclate Nausea And Vomiting    Trazodone Hcl Headaches     Other reaction(s): HEADACHE         Review of Systems:   Review of Systems   Constitutional:  Negative for activity change, appetite change, fatigue, fever and unexpected weight change. HENT:  Positive for ear pain, postnasal drip and rhinorrhea. Negative for congestion, facial swelling, mouth sores, sinus pressure, sneezing, tinnitus and voice change. Eyes:  Negative for discharge and itching. Respiratory:  Negative for apnea, cough, chest tightness, shortness of breath and wheezing.     Cardiovascular: Negative for chest pain, palpitations and leg swelling. Gastrointestinal:  Negative for abdominal distention, abdominal pain, constipation, diarrhea, nausea and vomiting. Endocrine: Negative for cold intolerance and heat intolerance. Genitourinary:  Negative for difficulty urinating, dysuria, flank pain, frequency, pelvic pain and urgency. Musculoskeletal:  Negative for back pain, gait problem, joint swelling, myalgias and neck pain. Skin:  Negative for color change, pallor, rash and wound. Allergic/Immunologic: Negative for environmental allergies and food allergies. Neurological:  Positive for headaches. Negative for dizziness, tremors, seizures, syncope, facial asymmetry, speech difficulty, weakness, light-headedness and numbness. Improved   Psychiatric/Behavioral:  Negative for agitation, behavioral problems, confusion, decreased concentration, dysphoric mood, sleep disturbance and suicidal ideas. The patient is not nervous/anxious and is not hyperactive. Physical Exam:   Vital Signs:  /60   Pulse 90   Resp 16   Ht 5' 6\" (1.676 m)   Wt 141 lb (64 kg)   SpO2 98%   BMI 22.76 kg/m²    Oxygen Saturation Interpretation: Normal.  Physical Exam  Vitals and nursing note reviewed. Constitutional:       Appearance: Normal appearance. She is normal weight. HENT:      Head: Normocephalic and atraumatic. Right Ear: Ear canal and external ear normal. A middle ear effusion is present. Left Ear: Ear canal and external ear normal. A middle ear effusion is present. Nose: Nose normal.      Mouth/Throat:      Mouth: Mucous membranes are moist.      Pharynx: Oropharynx is clear. Eyes:      Extraocular Movements: Extraocular movements intact. Conjunctiva/sclera: Conjunctivae normal.      Pupils: Pupils are equal, round, and reactive to light. Cardiovascular:      Rate and Rhythm: Normal rate and regular rhythm.       Pulses:           Dorsalis pedis pulses are 1+ on the right side and 1+ on the left side.      Heart sounds: Murmur heard.      Comments: Click  Pulmonary:      Effort: Pulmonary effort is normal.      Breath sounds: Normal breath sounds. No wheezing, rhonchi or rales.   Abdominal:      General: Bowel sounds are normal. There is no distension.      Palpations: Abdomen is soft.      Tenderness: There is no abdominal tenderness. There is no rebound.   Musculoskeletal:         General: Normal range of motion.      Cervical back: Normal range of motion and neck supple.      Right lower leg: No edema.      Left lower leg: No edema.   Skin:     General: Skin is warm and dry.      Capillary Refill: Capillary refill takes less than 2 seconds.   Neurological:      General: No focal deficit present.      Mental Status: She is alert and oriented to person, place, and time.   Psychiatric:         Mood and Affect: Mood normal.         Behavior: Behavior normal.         Thought Content: Thought content normal.         Judgment: Judgment normal.       Health Maintenance:     Health Maintenance   Topic Date Due    Cervical cancer screen  Never done    COVID-19 Vaccine (3 - Booster for Pfizer series) 05/27/2021    Breast cancer screen  09/04/2021    Colorectal Cancer Screen  11/20/2021    Flu vaccine (1) 08/01/2022    Pneumococcal 0-64 years Vaccine (2 - PCV) 12/01/2022    Lipids  03/18/2023    Depression Monitoring  03/18/2023    DTaP/Tdap/Td vaccine (2 - Td or Tdap) 08/16/2027    Shingles vaccine  Completed    Hepatitis C screen  Completed    HIV screen  Completed    Hepatitis A vaccine  Aged Out    Hib vaccine  Aged Out    Meningococcal (ACWY) vaccine  Aged Out        Immunization History   Administered Date(s) Administered    COVID-19, PFIZER PURPLE top, DILUTE for use, (age 12 y+), 30mcg/0.3mL 03/10/2021, 04/01/2021    Influenza, FLUARIX, FLULAVAL, FLUZONE (age 6 mo+) AND AFLURIA, (age 3 y+), PF, 0.5mL 11/24/2020    Zoster Recombinant (Shingrix) 11/24/2020        Testing:   All  laboratory and radiology results have been personally reviewed by myself. Labs:  Results for orders placed or performed in visit on 02/24/23   POCT INR   Result Value Ref Range    INR,(POC) 3.6     Prothrombin time,(POC) 42.9           Imaging: All Radiology results interpreted by Radiologist unless otherwise noted. No results found. Assessment/Plan:   I personally reviewed the patient's allergies, past medical history, medications, and vitals sign. Romero Dee was seen today for hypothyroidism. Diagnoses and all orders for this visit:    Major depressive disorder with single episode, in full remission ([facility] [address]  -     CBC; Future  -     Comprehensive Metabolic Panel; Future  -     Lipid Panel; Future  -     TSH; Future    S/P AVR (aortic valve replacement)  -     warfarin (COUMADIN) 5 MG tablet; Take 1 tablet by mouth daily  -     POCT INR    Intractable headache, unspecified chronicity pattern, unspecified headache type  -     SUMAtriptan (IMITREX) 25 MG tablet; Take 1 tablet by mouth daily as needed for Migraine    Dysthymia  -     PARoxetine (PAXIL) 20 MG tablet; TAKE 1 TABLET BY MOUTH ONCE DAILY IN THE MORNING    Allergic rhinitis, unspecified seasonality, unspecified trigger  -     cetirizine (ZYRTEC ALLERGY) 10 MG tablet; Take 1 tablet by mouth daily  -     albuterol sulfate HFA (VENTOLIN HFA) 108 (90 Base) MCG/ACT inhaler; Inhale 2 puffs into the lungs 4 times daily as needed for Wheezing  -     methylPREDNISolone (MEDROL DOSEPACK) 4 MG tablet; Take by mouth. Sinobronchitis    Hyperlipidemia, unspecified hyperlipidemia type  -     atorvastatin (LIPITOR) 40 MG tablet; Take 1 tablet by mouth daily  -     CBC; Future  -     Comprehensive Metabolic Panel; Future  -     Lipid Panel; Future  -     TSH;  Future    NPH (normal pressure hydrocephalus) (HCC)    S/P  shunt    Anticoagulant long-term use    Screening for colon cancer  -     POCT Fit Test; Future    Encounter for screening mammogram for malignant neoplasm of breast  -     EDEL DIGITAL SCREEN BILATERAL PER PROTOCOL; Future    Cigarette smoker  -     CT LUNG SCREENING; Future    Tinnitus of both ears  -     External Referral To Audiology    Continue all medications as prescribed, side effects discussed. Patient is agreeable to complete POCT fit test, mammogram and CT lung screening, these orders were placed today. Recheck INR in 2 weeks. Continue Coumadin 5 mg daily. INR goal 2.5-3.5. She currently smoking a half a pack a day, no interest in quitting at this time. Smoking cessation provided. Increase activity and follow a Mediterranean diet. We will obtain fasting lab work at a later date, orders placed. Call or go to ED immediately if symptoms worsen or persist.  Return in about 3 months for follow-up. Sooner if necessary. Counseled regarding above diagnosis, including possible risks and complications,especially if left uncontrolled. Counseled regarding the possible side effects, risks, benefits and alternatives to treatment; patient and/or guardian verbalizes understanding. Advised patient to call with any new medication issues. All questions answered. Reviewed age and gender appropriate health screening exams and vaccinations. Advised patient regarding importance of keeping up with recommended health maintenance and to schedule as soon as possible if overdue, as this is important in assessing for undiagnosed pathology, especially cancer. Patient verbalizes understanding and agrees.      SIGNATURE: Electronically signed by RANDI Balderas NP on 2/24/2023 at 3:13 PM

## 2023-02-27 DIAGNOSIS — E78.5 HYPERLIPIDEMIA, UNSPECIFIED HYPERLIPIDEMIA TYPE: ICD-10-CM

## 2023-02-27 DIAGNOSIS — F32.5 MAJOR DEPRESSIVE DISORDER WITH SINGLE EPISODE, IN FULL REMISSION (HCC): ICD-10-CM

## 2023-02-27 LAB
ALBUMIN SERPL-MCNC: 4.2 G/DL (ref 3.5–5.2)
ALP BLD-CCNC: 78 U/L (ref 35–104)
ALT SERPL-CCNC: 19 U/L (ref 0–32)
ANION GAP SERPL CALCULATED.3IONS-SCNC: 15 MMOL/L (ref 7–16)
AST SERPL-CCNC: 28 U/L (ref 0–31)
BILIRUB SERPL-MCNC: 0.3 MG/DL (ref 0–1.2)
BUN BLDV-MCNC: 13 MG/DL (ref 6–23)
CALCIUM SERPL-MCNC: 9.5 MG/DL (ref 8.6–10.2)
CHLORIDE BLD-SCNC: 109 MMOL/L (ref 98–107)
CHOLESTEROL, TOTAL: 165 MG/DL (ref 0–199)
CO2: 19 MMOL/L (ref 22–29)
CREAT SERPL-MCNC: 0.6 MG/DL (ref 0.5–1)
GFR SERPL CREATININE-BSD FRML MDRD: >60 ML/MIN/1.73
GLUCOSE BLD-MCNC: 80 MG/DL (ref 74–99)
HCT VFR BLD CALC: 38 % (ref 34–48)
HDLC SERPL-MCNC: 48 MG/DL
HEMOGLOBIN: 12.6 G/DL (ref 11.5–15.5)
LDL CHOLESTEROL CALCULATED: 92 MG/DL (ref 0–99)
MCH RBC QN AUTO: 30.5 PG (ref 26–35)
MCHC RBC AUTO-ENTMCNC: 33.2 % (ref 32–34.5)
MCV RBC AUTO: 92 FL (ref 80–99.9)
PDW BLD-RTO: 12.5 FL (ref 11.5–15)
PLATELET # BLD: 235 E9/L (ref 130–450)
PMV BLD AUTO: 10.8 FL (ref 7–12)
POTASSIUM SERPL-SCNC: 4.1 MMOL/L (ref 3.5–5)
RBC # BLD: 4.13 E12/L (ref 3.5–5.5)
SODIUM BLD-SCNC: 143 MMOL/L (ref 132–146)
TOTAL PROTEIN: 6.6 G/DL (ref 6.4–8.3)
TRIGL SERPL-MCNC: 123 MG/DL (ref 0–149)
TSH SERPL DL<=0.05 MIU/L-ACNC: 2.09 UIU/ML (ref 0.27–4.2)
VLDLC SERPL CALC-MCNC: 25 MG/DL
WBC # BLD: 5.3 E9/L (ref 4.5–11.5)

## 2023-03-10 ENCOUNTER — NURSE ONLY (OUTPATIENT)
Dept: PRIMARY CARE CLINIC | Age: 63
End: 2023-03-10

## 2023-03-10 DIAGNOSIS — Z95.2 S/P AVR (AORTIC VALVE REPLACEMENT): Primary | ICD-10-CM

## 2023-03-10 LAB
INTERNATIONAL NORMALIZATION RATIO, POC: 5.1
PROTHROMBIN TIME, POC: 61.7

## 2023-03-14 ENCOUNTER — NURSE ONLY (OUTPATIENT)
Dept: PRIMARY CARE CLINIC | Age: 63
End: 2023-03-14

## 2023-03-14 DIAGNOSIS — Z95.2 S/P AVR (AORTIC VALVE REPLACEMENT): Primary | ICD-10-CM

## 2023-03-14 LAB
INTERNATIONAL NORMALIZATION RATIO, POC: 1.2
PROTHROMBIN TIME, POC: 14.7

## 2023-03-20 ENCOUNTER — NURSE ONLY (OUTPATIENT)
Dept: PRIMARY CARE CLINIC | Age: 63
End: 2023-03-20

## 2023-03-20 DIAGNOSIS — Z95.2 S/P AVR (AORTIC VALVE REPLACEMENT): Primary | ICD-10-CM

## 2023-03-20 LAB
INTERNATIONAL NORMALIZATION RATIO, POC: 3.2
PROTHROMBIN TIME, POC: 38.3

## 2023-03-27 ENCOUNTER — HOSPITAL ENCOUNTER (OUTPATIENT)
Dept: CT IMAGING | Age: 63
Discharge: HOME | End: 2023-03-29
Payer: COMMERCIAL

## 2023-03-27 ENCOUNTER — HOSPITAL ENCOUNTER (OUTPATIENT)
Dept: MAMMOGRAPHY | Age: 63
Discharge: HOME | End: 2023-03-29
Payer: COMMERCIAL

## 2023-03-27 DIAGNOSIS — F17.210 CIGARETTE SMOKER: ICD-10-CM

## 2023-03-27 DIAGNOSIS — Z12.31 ENCOUNTER FOR SCREENING MAMMOGRAM FOR MALIGNANT NEOPLASM OF BREAST: ICD-10-CM

## 2023-03-27 PROCEDURE — 77067 SCR MAMMO BI INCL CAD: CPT

## 2023-03-27 PROCEDURE — 71271 CT THORAX LUNG CANCER SCR C-: CPT

## 2023-03-28 ENCOUNTER — TELEPHONE (OUTPATIENT)
Dept: GENERAL RADIOLOGY | Age: 63
End: 2023-03-28

## 2023-03-28 DIAGNOSIS — Z95.2 S/P AVR (AORTIC VALVE REPLACEMENT): ICD-10-CM

## 2023-03-28 DIAGNOSIS — I71.21 ANEURYSM OF ASCENDING AORTA WITHOUT RUPTURE (HCC): Primary | ICD-10-CM

## 2023-03-28 DIAGNOSIS — R92.8 ABNORMAL MAMMOGRAM: Primary | ICD-10-CM

## 2023-03-28 DIAGNOSIS — N64.89 BREAST ASYMMETRY: ICD-10-CM

## 2023-03-28 NOTE — TELEPHONE ENCOUNTER
Call to patient in reference to her mammogram performed at [facility] on March 27, 2023. I spoke with her daughter Quang Murry Spearing that the radiologist has recommended some additional breast imaging in order to make a final determination/result. Informed her that a Rx has been obtained by the ordering physician. Next, a  from [facility] will contact patient to schedule the additional imaging study/studies. Verbalizes understanding and is agreeable to proceed at [facility].

## 2023-03-30 ENCOUNTER — TELEPHONE (OUTPATIENT)
Dept: CASE MANAGEMENT | Age: 63
End: 2023-03-30

## 2023-03-30 NOTE — TELEPHONE ENCOUNTER
No call, encounter opened to process CT Lung Screening. CT Lung Screen: 3/27/2023    Impression   1. There is no pulmonary infiltrate, mass or suspicious pulmonary nodule   2. Aneurysmal dilatation of the ascending thoracic aorta measuring 4.4 x 4.6   cm. Follow-up CT scan is recommended in 1 year. 3. Emphysematous changes       LUNG RADS:   Lung-RADS 2 - Benign ()       Management:  12 month screening LDCT       RECOMMENDATIONS:   If you would like to register your patient with the [facility], please contact the Nurse Navigator at   [phone]. Pack years: 10    Social History     Tobacco Use  Smoking Status: Current Every Day Smoker    Start Date: 08/04/1980   Quit Date:    Types: Cigarettes   Packs/Day: 0.25   Years: 40   Pack Years: 10   Smokeless Tobacco: Never         Results letter sent to patient via my chart or mailed.      [facility]

## 2023-04-19 DIAGNOSIS — N64.89 BREAST ASYMMETRY: ICD-10-CM

## 2023-05-02 ENCOUNTER — OFFICE VISIT (OUTPATIENT)
Dept: PRIMARY CARE CLINIC | Age: 63
End: 2023-05-02
Payer: COMMERCIAL

## 2023-05-02 VITALS
RESPIRATION RATE: 16 BRPM | OXYGEN SATURATION: 97 % | WEIGHT: 150 LBS | HEART RATE: 85 BPM | BODY MASS INDEX: 24.11 KG/M2 | SYSTOLIC BLOOD PRESSURE: 128 MMHG | HEIGHT: 66 IN | DIASTOLIC BLOOD PRESSURE: 74 MMHG

## 2023-05-02 DIAGNOSIS — Z12.11 SCREENING FOR COLON CANCER: ICD-10-CM

## 2023-05-02 DIAGNOSIS — H25.9 SENILE CATARACT OF LEFT EYE, UNSPECIFIED AGE-RELATED CATARACT TYPE: ICD-10-CM

## 2023-05-02 DIAGNOSIS — J30.9 ALLERGIC RHINITIS, UNSPECIFIED SEASONALITY, UNSPECIFIED TRIGGER: ICD-10-CM

## 2023-05-02 DIAGNOSIS — E78.5 HYPERLIPIDEMIA, UNSPECIFIED HYPERLIPIDEMIA TYPE: ICD-10-CM

## 2023-05-02 DIAGNOSIS — Z95.2 S/P AVR (AORTIC VALVE REPLACEMENT): ICD-10-CM

## 2023-05-02 DIAGNOSIS — R51.9 INTRACTABLE HEADACHE, UNSPECIFIED CHRONICITY PATTERN, UNSPECIFIED HEADACHE TYPE: ICD-10-CM

## 2023-05-02 DIAGNOSIS — I71.21 ANEURYSM OF ASCENDING AORTA WITHOUT RUPTURE (HCC): Primary | ICD-10-CM

## 2023-05-02 DIAGNOSIS — F34.1 DYSTHYMIA: ICD-10-CM

## 2023-05-02 LAB
INTERNATIONAL NORMALIZATION RATIO, POC: 1.5
PROTHROMBIN TIME, POC: 27

## 2023-05-02 PROCEDURE — 4004F PT TOBACCO SCREEN RCVD TLK: CPT | Performed by: NURSE PRACTITIONER

## 2023-05-02 PROCEDURE — 85610 PROTHROMBIN TIME: CPT | Performed by: NURSE PRACTITIONER

## 2023-05-02 PROCEDURE — 99214 OFFICE O/P EST MOD 30 MIN: CPT | Performed by: NURSE PRACTITIONER

## 2023-05-02 PROCEDURE — G8427 DOCREV CUR MEDS BY ELIG CLIN: HCPCS | Performed by: NURSE PRACTITIONER

## 2023-05-02 PROCEDURE — G8420 CALC BMI NORM PARAMETERS: HCPCS | Performed by: NURSE PRACTITIONER

## 2023-05-02 PROCEDURE — 3017F COLORECTAL CA SCREEN DOC REV: CPT | Performed by: NURSE PRACTITIONER

## 2023-05-02 RX ORDER — SUMATRIPTAN 25 MG/1
25 TABLET, FILM COATED ORAL DAILY PRN
Qty: 9 TABLET | Refills: 2 | Status: CANCELLED | OUTPATIENT
Start: 2023-05-02

## 2023-05-02 RX ORDER — PAROXETINE HYDROCHLORIDE 20 MG/1
TABLET, FILM COATED ORAL
Qty: 90 TABLET | Refills: 1 | Status: SHIPPED | OUTPATIENT
Start: 2023-05-02

## 2023-05-02 RX ORDER — ALBUTEROL SULFATE 90 UG/1
2 AEROSOL, METERED RESPIRATORY (INHALATION) EVERY 4 HOURS PRN
Qty: 18 G | Refills: 3 | Status: SHIPPED | OUTPATIENT
Start: 2023-05-02

## 2023-05-02 RX ORDER — ALBUTEROL SULFATE 90 UG/1
2 AEROSOL, METERED RESPIRATORY (INHALATION) 4 TIMES DAILY PRN
Qty: 18 G | Refills: 0 | Status: SHIPPED | OUTPATIENT
Start: 2023-05-02

## 2023-05-02 RX ORDER — CETIRIZINE HYDROCHLORIDE 10 MG/1
10 TABLET ORAL DAILY
Qty: 90 TABLET | Refills: 1 | Status: SHIPPED | OUTPATIENT
Start: 2023-05-02

## 2023-05-02 RX ORDER — WARFARIN SODIUM 5 MG/1
5 TABLET ORAL DAILY
Qty: 90 TABLET | Refills: 1 | Status: SHIPPED | OUTPATIENT
Start: 2023-05-02

## 2023-05-02 RX ORDER — DULOXETIN HYDROCHLORIDE 60 MG/1
1 CAPSULE, DELAYED RELEASE ORAL DAILY
COMMUNITY
Start: 2017-01-20 | End: 2023-05-02

## 2023-05-02 RX ORDER — HYDROCHLOROTHIAZIDE 25 MG/1
25 TABLET ORAL EVERY MORNING
Qty: 30 TABLET | Refills: 2 | Status: SHIPPED | OUTPATIENT
Start: 2023-05-02

## 2023-05-02 RX ORDER — BUPROPION HYDROCHLORIDE 150 MG/1
150 TABLET ORAL EVERY MORNING
Qty: 30 TABLET | Refills: 3 | Status: SHIPPED | OUTPATIENT
Start: 2023-05-02

## 2023-05-02 RX ORDER — ATORVASTATIN CALCIUM 40 MG/1
40 TABLET, FILM COATED ORAL DAILY
Qty: 90 TABLET | Refills: 1 | Status: SHIPPED | OUTPATIENT
Start: 2023-05-02

## 2023-05-03 ASSESSMENT — ENCOUNTER SYMPTOMS
EYE DISCHARGE: 0
VOICE CHANGE: 0
SINUS PRESSURE: 0
ABDOMINAL DISTENTION: 0
ABDOMINAL PAIN: 0
DIARRHEA: 0
RHINORRHEA: 0
COUGH: 0
APNEA: 0
NAUSEA: 0
CHEST TIGHTNESS: 0
BACK PAIN: 0
FACIAL SWELLING: 0
WHEEZING: 0
COLOR CHANGE: 0
EYE ITCHING: 0
SHORTNESS OF BREATH: 0
VOMITING: 0
CONSTIPATION: 0

## 2023-05-03 NOTE — PROGRESS NOTES
May 3, 2023     Hilda Hoff Holter 57 y.o. female    : 1960    Chief Complaint:   Depression and Coagulation Disorder (Patient is taking 5 mg daily, she does admit she has missed a few doses)       History of Present Illness:   Sonia Mejia is a 57 y.o. female who presents to the office for follow-up on her chronic problems. She has a pertinent past medical history of an AVR about 30 years ago in [place]. She has yet to establish with cardiology. I did place an order for [facility] cardiology and [address] [place] [address] cardiology. She is taking warfarin with a goal INR of 2.5-3.5. Most recent INR 1.5. She reports she missed one dose. She is noncompliant with warfarin dosing and was previously referred to the coumadin clinic due to sub/supratherapeutic INR's. She declines referral, I did readdress today and politely declines. She denies any abnormal bleeding or bruising. Hyperlipidemia- she is currently taking Lipitor without any side effects. She does not exercise on a regular basis. She does attempt to follow a low-salt diet. LDL 92, total cholesterol 165, triglycerides 123, HDL 48. Anxiety and depression is manageable. Taking Paxil as prescribed she does not participate in counseling. She denies any suicidal or homicidal ideations. We have previously tried to increase Paxil dose and she did not tolerate. Her biggest complaint today is loneliness. She does live with her daughter which is a good support system but she has no hobby or anything to keep her busy while they are at work. NPH status post  shunt 6 years ago in [place]-stable. Follows with Dr. Keke Salmeron. CT lung screening was negative for any pulmonary nodule. There was an incidental finding of an aneurysmal dilation of the ascending thoracic aortic measuring 4.4 x 4.6 cm. Given patient has a history of AVR, I did establish to vascular surgery. Patient reports that needs to be deferred to cardiothoracic surgery.   A referral will be

## 2023-05-12 ENCOUNTER — NURSE ONLY (OUTPATIENT)
Dept: PRIMARY CARE CLINIC | Age: 63
End: 2023-05-12
Payer: COMMERCIAL

## 2023-05-12 DIAGNOSIS — Z95.2 S/P AVR (AORTIC VALVE REPLACEMENT): Primary | ICD-10-CM

## 2023-05-12 LAB
INTERNATIONAL NORMALIZATION RATIO, POC: 2.2
PROTHROMBIN TIME, POC: 28

## 2023-05-12 PROCEDURE — 93793 ANTICOAG MGMT PT WARFARIN: CPT | Performed by: NURSE PRACTITIONER

## 2023-05-12 PROCEDURE — 85610 PROTHROMBIN TIME: CPT | Performed by: NURSE PRACTITIONER

## 2023-05-24 ENCOUNTER — NURSE ONLY (OUTPATIENT)
Dept: PRIMARY CARE CLINIC | Age: 63
End: 2023-05-24

## 2023-05-24 ENCOUNTER — TELEPHONE (OUTPATIENT)
Dept: PRIMARY CARE CLINIC | Age: 63
End: 2023-05-24

## 2023-05-24 DIAGNOSIS — Z95.2 S/P AVR (AORTIC VALVE REPLACEMENT): Primary | ICD-10-CM

## 2023-05-24 LAB
INTERNATIONAL NORMALIZATION RATIO, POC: 5
PROTHROMBIN TIME, POC: 59.4

## 2023-05-26 ENCOUNTER — NURSE ONLY (OUTPATIENT)
Dept: PRIMARY CARE CLINIC | Age: 63
End: 2023-05-26

## 2023-05-26 DIAGNOSIS — Z95.2 S/P AVR (AORTIC VALVE REPLACEMENT): Primary | ICD-10-CM

## 2023-05-26 LAB
INTERNATIONAL NORMALIZATION RATIO, POC: 2.1
PROTHROMBIN TIME, POC: 25.2

## 2023-06-15 DIAGNOSIS — Z95.2 S/P AVR (AORTIC VALVE REPLACEMENT): ICD-10-CM

## 2023-06-15 DIAGNOSIS — E78.5 HYPERLIPIDEMIA, UNSPECIFIED HYPERLIPIDEMIA TYPE: ICD-10-CM

## 2023-06-15 LAB
ALBUMIN SERPL-MCNC: 4.4 G/DL (ref 3.5–5.2)
ALP SERPL-CCNC: 96 U/L (ref 35–104)
ALT SERPL-CCNC: 12 U/L (ref 0–32)
ANION GAP SERPL CALCULATED.3IONS-SCNC: 16 MMOL/L (ref 7–16)
APTT BLD: 47.1 SEC (ref 24.5–35.1)
AST SERPL-CCNC: 19 U/L (ref 0–31)
BILIRUB SERPL-MCNC: 0.4 MG/DL (ref 0–1.2)
BUN SERPL-MCNC: 5 MG/DL (ref 6–23)
CALCIUM SERPL-MCNC: 9.9 MG/DL (ref 8.6–10.2)
CHLORIDE SERPL-SCNC: 109 MMOL/L (ref 98–107)
CHOLESTEROL, TOTAL: 152 MG/DL (ref 0–199)
CO2 SERPL-SCNC: 19 MMOL/L (ref 22–29)
CREAT SERPL-MCNC: 0.8 MG/DL (ref 0.5–1)
ERYTHROCYTE [DISTWIDTH] IN BLOOD BY AUTOMATED COUNT: 13.1 FL (ref 11.5–15)
GLUCOSE SERPL-MCNC: 94 MG/DL (ref 74–99)
HCT VFR BLD AUTO: 44.6 % (ref 34–48)
HDLC SERPL-MCNC: 45 MG/DL
HGB BLD-MCNC: 13.9 G/DL (ref 11.5–15.5)
INR BLD: 2.7
LDLC SERPL CALC-MCNC: 77 MG/DL (ref 0–99)
MCH RBC QN AUTO: 30.3 PG (ref 26–35)
MCHC RBC AUTO-ENTMCNC: 31.2 % (ref 32–34.5)
MCV RBC AUTO: 97.2 FL (ref 80–99.9)
PLATELET # BLD AUTO: 243 E9/L (ref 130–450)
PMV BLD AUTO: 10.9 FL (ref 7–12)
POTASSIUM SERPL-SCNC: 4.3 MMOL/L (ref 3.5–5)
PROT SERPL-MCNC: 7.4 G/DL (ref 6.4–8.3)
PROTHROMBIN TIME: 29.3 SEC (ref 9.3–12.4)
RBC # BLD AUTO: 4.59 E12/L (ref 3.5–5.5)
SODIUM SERPL-SCNC: 144 MMOL/L (ref 132–146)
TRIGL SERPL-MCNC: 149 MG/DL (ref 0–149)
VLDLC SERPL CALC-MCNC: 30 MG/DL
WBC # BLD: 5.5 E9/L (ref 4.5–11.5)

## 2023-06-26 ENCOUNTER — NURSE ONLY (OUTPATIENT)
Dept: PRIMARY CARE CLINIC | Age: 63
End: 2023-06-26
Payer: COMMERCIAL

## 2023-06-26 DIAGNOSIS — Z95.2 S/P AVR (AORTIC VALVE REPLACEMENT): Primary | ICD-10-CM

## 2023-06-26 LAB
INTERNATIONAL NORMALIZATION RATIO, POC: 1.6
PROTHROMBIN TIME, POC: 19.3

## 2023-06-26 PROCEDURE — 93793 ANTICOAG MGMT PT WARFARIN: CPT | Performed by: NURSE PRACTITIONER

## 2023-06-26 PROCEDURE — 85610 PROTHROMBIN TIME: CPT | Performed by: NURSE PRACTITIONER

## 2023-07-17 PROBLEM — Z79.01 ANTICOAGULANT LONG-TERM USE: Status: RESOLVED | Noted: 2021-09-16 | Resolved: 2023-07-17

## 2023-07-18 ENCOUNTER — OFFICE VISIT (OUTPATIENT)
Dept: CARDIOLOGY CLINIC | Age: 63
End: 2023-07-18
Payer: COMMERCIAL

## 2023-07-18 VITALS
SYSTOLIC BLOOD PRESSURE: 116 MMHG | RESPIRATION RATE: 18 BRPM | BODY MASS INDEX: 22.53 KG/M2 | DIASTOLIC BLOOD PRESSURE: 74 MMHG | HEIGHT: 66 IN | WEIGHT: 140.2 LBS | HEART RATE: 72 BPM

## 2023-07-18 DIAGNOSIS — Z95.2 S/P AVR (AORTIC VALVE REPLACEMENT): ICD-10-CM

## 2023-07-18 PROCEDURE — 99213 OFFICE O/P EST LOW 20 MIN: CPT | Performed by: INTERNAL MEDICINE

## 2023-07-18 PROCEDURE — 4004F PT TOBACCO SCREEN RCVD TLK: CPT | Performed by: INTERNAL MEDICINE

## 2023-07-18 PROCEDURE — 93000 ELECTROCARDIOGRAM COMPLETE: CPT | Performed by: INTERNAL MEDICINE

## 2023-07-18 PROCEDURE — G8420 CALC BMI NORM PARAMETERS: HCPCS | Performed by: INTERNAL MEDICINE

## 2023-07-18 PROCEDURE — 3017F COLORECTAL CA SCREEN DOC REV: CPT | Performed by: INTERNAL MEDICINE

## 2023-07-18 PROCEDURE — G8427 DOCREV CUR MEDS BY ELIG CLIN: HCPCS | Performed by: INTERNAL MEDICINE

## 2023-07-18 NOTE — PROGRESS NOTES
Chief Complaint   Patient presents with    Valvular Heart Disease       Patient Active Problem List    Diagnosis Date Noted    NPH (normal pressure hydrocephalus) ([address]) 03/18/2022    S/P  shunt 11/01/2021    S/P AVR (aortic valve replacement) 11/24/2020     Overview Note:     A. Mechanical AVR in 1990's ([place])  B. TTE 2021: MG = 15 mm, normal LV        Hyperlipidemia 11/24/2020    Depression 11/24/2020       Current Outpatient Medications   Medication Sig Dispense Refill    warfarin (COUMADIN) 5 MG tablet Take 1 tablet by mouth daily 90 tablet 1    PARoxetine (PAXIL) 20 MG tablet TAKE 1 TABLET BY MOUTH ONCE DAILY IN THE MORNING 90 tablet 1    cetirizine (ZYRTEC ALLERGY) 10 MG tablet Take 1 tablet by mouth daily 90 tablet 1    atorvastatin (LIPITOR) 40 MG tablet Take 1 tablet by mouth daily 90 tablet 1    albuterol sulfate HFA (VENTOLIN HFA) 108 (90 Base) MCG/ACT inhaler Inhale 2 puffs into the lungs 4 times daily as needed for Wheezing 18 g 0    buPROPion (WELLBUTRIN XL) 150 MG extended release tablet Take 1 tablet by mouth every morning 30 tablet 3    VENTOLIN  (90 Base) MCG/ACT inhaler Inhale 2 puffs into the lungs every 4 hours as needed for Wheezing or Shortness of Breath 18 g 3    SUMAtriptan (IMITREX) 25 MG tablet Take 1 tablet by mouth daily as needed for Migraine 9 tablet 2    fluticasone (FLONASE) 50 MCG/ACT nasal spray       EQ PAIN RELIEVER 500 MG tablet       Incontinence Supply Disposable (INCONTINENCE BRIEF LARGE) MISC 1 each by Does not apply route daily 2 Package 3     No current facility-administered medications for this visit.         Allergies   Allergen Reactions    Pcn [Penicillins]     Penicillin G      Other reaction(s): Unknown    Doxycycline Hyclate Nausea And Vomiting    Trazodone Hcl Headaches     Other reaction(s): HEADACHE         Vitals:    07/18/23 1326   BP: 116/74   Pulse: 72   Resp: 18   Weight: 140 lb 3.2 oz (63.6 kg)   Height: 5' 6\" (1.676 m)               SUBJECTIVE:

## 2023-07-25 RX ORDER — MECLIZINE HCL 12.5 MG/1
TABLET ORAL
Qty: 30 TABLET | Refills: 0 | Status: SHIPPED | OUTPATIENT
Start: 2023-07-25

## 2023-07-25 NOTE — TELEPHONE ENCOUNTER
Last Appointment:  6/13/2023  Future Appointments   Date Time Provider [address]   9/15/2023  1:45 PM RANDI Davis - NP [facility]   7/18/2024  1:00 PM Jameson Comer MD [address] [facility]

## 2023-08-01 ENCOUNTER — NURSE ONLY (OUTPATIENT)
Dept: PRIMARY CARE CLINIC | Age: 63
End: 2023-08-01
Payer: COMMERCIAL

## 2023-08-01 DIAGNOSIS — Z95.2 S/P AVR (AORTIC VALVE REPLACEMENT): Primary | ICD-10-CM

## 2023-08-01 LAB
INTERNATIONAL NORMALIZATION RATIO, POC: 3.6
PROTHROMBIN TIME, POC: 43.4

## 2023-08-01 PROCEDURE — 93793 ANTICOAG MGMT PT WARFARIN: CPT | Performed by: NURSE PRACTITIONER

## 2023-08-01 PROCEDURE — 85610 PROTHROMBIN TIME: CPT | Performed by: NURSE PRACTITIONER

## 2023-09-15 ENCOUNTER — OFFICE VISIT (OUTPATIENT)
Dept: PRIMARY CARE CLINIC | Age: 63
End: 2023-09-15

## 2023-09-15 VITALS
OXYGEN SATURATION: 98 % | HEART RATE: 93 BPM | HEIGHT: 66 IN | BODY MASS INDEX: 23.46 KG/M2 | SYSTOLIC BLOOD PRESSURE: 130 MMHG | DIASTOLIC BLOOD PRESSURE: 74 MMHG | WEIGHT: 146 LBS | RESPIRATION RATE: 16 BRPM

## 2023-09-15 DIAGNOSIS — Z95.2 S/P AVR (AORTIC VALVE REPLACEMENT): ICD-10-CM

## 2023-09-15 DIAGNOSIS — F34.1 DYSTHYMIA: ICD-10-CM

## 2023-09-15 DIAGNOSIS — R79.1 SUPRATHERAPEUTIC INR: ICD-10-CM

## 2023-09-15 DIAGNOSIS — R51.9 INTRACTABLE HEADACHE, UNSPECIFIED CHRONICITY PATTERN, UNSPECIFIED HEADACHE TYPE: ICD-10-CM

## 2023-09-15 DIAGNOSIS — E78.5 HYPERLIPIDEMIA, UNSPECIFIED HYPERLIPIDEMIA TYPE: ICD-10-CM

## 2023-09-15 DIAGNOSIS — M54.50 ACUTE BILATERAL LOW BACK PAIN WITHOUT SCIATICA: ICD-10-CM

## 2023-09-15 DIAGNOSIS — J30.9 ALLERGIC RHINITIS, UNSPECIFIED SEASONALITY, UNSPECIFIED TRIGGER: ICD-10-CM

## 2023-09-15 DIAGNOSIS — M54.50 ACUTE BILATERAL LOW BACK PAIN WITHOUT SCIATICA: Primary | ICD-10-CM

## 2023-09-15 LAB
BILIRUBIN, POC: NEGATIVE
BLOOD URINE, POC: NEGATIVE
CLARITY, POC: NORMAL
COLOR, POC: NORMAL
GLUCOSE URINE, POC: NEGATIVE
INTERNATIONAL NORMALIZATION RATIO, POC: 5.2
KETONES, POC: NEGATIVE
LEUKOCYTE EST, POC: NEGATIVE
NITRITE, POC: NEGATIVE
PH, POC: 6
PROTEIN, POC: NEGATIVE
PROTHROMBIN TIME, POC: 62.5
SPECIFIC GRAVITY, POC: 1.01
UROBILINOGEN, POC: 0.2

## 2023-09-15 RX ORDER — CETIRIZINE HYDROCHLORIDE 10 MG/1
10 TABLET ORAL DAILY
Qty: 90 TABLET | Refills: 1 | Status: SHIPPED | OUTPATIENT
Start: 2023-09-15

## 2023-09-15 RX ORDER — WARFARIN SODIUM 5 MG/1
5 TABLET ORAL DAILY
Qty: 90 TABLET | Refills: 1 | Status: CANCELLED | OUTPATIENT
Start: 2023-09-15

## 2023-09-15 RX ORDER — ALBUTEROL SULFATE 90 UG/1
2 AEROSOL, METERED RESPIRATORY (INHALATION) EVERY 4 HOURS PRN
Qty: 18 G | Refills: 3 | Status: SHIPPED | OUTPATIENT
Start: 2023-09-15

## 2023-09-15 RX ORDER — SUMATRIPTAN 25 MG/1
25 TABLET, FILM COATED ORAL DAILY PRN
Qty: 9 TABLET | Refills: 2 | Status: SHIPPED | OUTPATIENT
Start: 2023-09-15

## 2023-09-15 RX ORDER — BUPROPION HYDROCHLORIDE 150 MG/1
150 TABLET ORAL EVERY MORNING
Qty: 30 TABLET | Refills: 3 | Status: SHIPPED | OUTPATIENT
Start: 2023-09-15

## 2023-09-15 RX ORDER — MECLIZINE HCL 12.5 MG/1
TABLET ORAL
Qty: 30 TABLET | Refills: 0 | Status: SHIPPED | OUTPATIENT
Start: 2023-09-15

## 2023-09-15 RX ORDER — ALBUTEROL SULFATE 90 UG/1
2 AEROSOL, METERED RESPIRATORY (INHALATION) 4 TIMES DAILY PRN
Qty: 18 G | Refills: 0 | Status: SHIPPED | OUTPATIENT
Start: 2023-09-15

## 2023-09-15 RX ORDER — PAROXETINE HYDROCHLORIDE 20 MG/1
TABLET, FILM COATED ORAL
Qty: 90 TABLET | Refills: 1 | Status: SHIPPED | OUTPATIENT
Start: 2023-09-15

## 2023-09-15 RX ORDER — ATORVASTATIN CALCIUM 40 MG/1
40 TABLET, FILM COATED ORAL DAILY
Qty: 90 TABLET | Refills: 1 | Status: SHIPPED | OUTPATIENT
Start: 2023-09-15

## 2023-09-15 SDOH — ECONOMIC STABILITY: HOUSING INSECURITY
IN THE LAST 12 MONTHS, WAS THERE A TIME WHEN YOU DID NOT HAVE A STEADY PLACE TO SLEEP OR SLEPT IN A SHELTER (INCLUDING NOW)?: NO

## 2023-09-15 SDOH — ECONOMIC STABILITY: FOOD INSECURITY: WITHIN THE PAST 12 MONTHS, YOU WORRIED THAT YOUR FOOD WOULD RUN OUT BEFORE YOU GOT MONEY TO BUY MORE.: NEVER TRUE

## 2023-09-15 SDOH — ECONOMIC STABILITY: INCOME INSECURITY: HOW HARD IS IT FOR YOU TO PAY FOR THE VERY BASICS LIKE FOOD, HOUSING, MEDICAL CARE, AND HEATING?: NOT HARD AT ALL

## 2023-09-15 SDOH — ECONOMIC STABILITY: FOOD INSECURITY: WITHIN THE PAST 12 MONTHS, THE FOOD YOU BOUGHT JUST DIDN'T LAST AND YOU DIDN'T HAVE MONEY TO GET MORE.: NEVER TRUE

## 2023-09-15 ASSESSMENT — ENCOUNTER SYMPTOMS
WHEEZING: 0
BACK PAIN: 1
SHORTNESS OF BREATH: 0
DIARRHEA: 0
COUGH: 0
ABDOMINAL PAIN: 0
CONSTIPATION: 0
NAUSEA: 0

## 2023-09-15 ASSESSMENT — PATIENT HEALTH QUESTIONNAIRE - PHQ9
8. MOVING OR SPEAKING SO SLOWLY THAT OTHER PEOPLE COULD HAVE NOTICED. OR THE OPPOSITE, BEING SO FIGETY OR RESTLESS THAT YOU HAVE BEEN MOVING AROUND A LOT MORE THAN USUAL: 0
SUM OF ALL RESPONSES TO PHQ QUESTIONS 1-9: 0
SUM OF ALL RESPONSES TO PHQ9 QUESTIONS 1 & 2: 0
5. POOR APPETITE OR OVEREATING: 0
1. LITTLE INTEREST OR PLEASURE IN DOING THINGS: 0
4. FEELING TIRED OR HAVING LITTLE ENERGY: 0
SUM OF ALL RESPONSES TO PHQ QUESTIONS 1-9: 0
SUM OF ALL RESPONSES TO PHQ QUESTIONS 1-9: 0
10. IF YOU CHECKED OFF ANY PROBLEMS, HOW DIFFICULT HAVE THESE PROBLEMS MADE IT FOR YOU TO DO YOUR WORK, TAKE CARE OF THINGS AT HOME, OR GET ALONG WITH OTHER PEOPLE: 0
3. TROUBLE FALLING OR STAYING ASLEEP: 0
9. THOUGHTS THAT YOU WOULD BE BETTER OFF DEAD, OR OF HURTING YOURSELF: 0
SUM OF ALL RESPONSES TO PHQ QUESTIONS 1-9: 0
2. FEELING DOWN, DEPRESSED OR HOPELESS: 0
6. FEELING BAD ABOUT YOURSELF - OR THAT YOU ARE A FAILURE OR HAVE LET YOURSELF OR YOUR FAMILY DOWN: 0
7. TROUBLE CONCENTRATING ON THINGS, SUCH AS READING THE NEWSPAPER OR WATCHING TELEVISION: 0

## 2023-09-15 NOTE — PROGRESS NOTES
advised to call or send [employer] message for any concerns prior to next appointment. Return in about 1 week (around 9/22/2023).   RANDI Aldridge NP

## 2023-09-17 LAB
CULTURE: NORMAL
SPECIMEN DESCRIPTION: NORMAL

## 2023-09-18 ENCOUNTER — NURSE ONLY (OUTPATIENT)
Dept: PRIMARY CARE CLINIC | Age: 63
End: 2023-09-18
Payer: COMMERCIAL

## 2023-09-18 DIAGNOSIS — Z95.2 S/P AVR (AORTIC VALVE REPLACEMENT): Primary | ICD-10-CM

## 2023-09-18 LAB — INTERNATIONAL NORMALIZATION RATIO, POC: 1.4

## 2023-09-18 PROCEDURE — 93793 ANTICOAG MGMT PT WARFARIN: CPT | Performed by: NURSE PRACTITIONER

## 2023-09-18 PROCEDURE — 85610 PROTHROMBIN TIME: CPT | Performed by: NURSE PRACTITIONER

## 2023-09-26 ENCOUNTER — NURSE ONLY (OUTPATIENT)
Dept: PRIMARY CARE CLINIC | Age: 63
End: 2023-09-26
Payer: COMMERCIAL

## 2023-09-26 DIAGNOSIS — Z95.2 S/P AVR (AORTIC VALVE REPLACEMENT): Primary | ICD-10-CM

## 2023-09-26 LAB
INTERNATIONAL NORMALIZATION RATIO, POC: 2.6
PROTHROMBIN TIME, POC: 30.9

## 2023-09-26 PROCEDURE — 93793 ANTICOAG MGMT PT WARFARIN: CPT | Performed by: NURSE PRACTITIONER

## 2023-09-26 PROCEDURE — 85610 PROTHROMBIN TIME: CPT | Performed by: NURSE PRACTITIONER

## 2023-10-26 ENCOUNTER — NURSE ONLY (OUTPATIENT)
Dept: PRIMARY CARE CLINIC | Age: 63
End: 2023-10-26
Payer: COMMERCIAL

## 2023-10-26 DIAGNOSIS — Z95.2 S/P AVR (AORTIC VALVE REPLACEMENT): Primary | ICD-10-CM

## 2023-10-26 LAB
INTERNATIONAL NORMALIZATION RATIO, POC: 5.3
PROTHROMBIN TIME, POC: 63.3

## 2023-10-26 PROCEDURE — 93793 ANTICOAG MGMT PT WARFARIN: CPT | Performed by: NURSE PRACTITIONER

## 2023-10-26 PROCEDURE — 85610 PROTHROMBIN TIME: CPT | Performed by: NURSE PRACTITIONER

## 2023-10-27 ENCOUNTER — ANTI-COAG VISIT (OUTPATIENT)
Dept: PRIMARY CARE CLINIC | Age: 63
End: 2023-10-27

## 2023-10-27 ENCOUNTER — NURSE ONLY (OUTPATIENT)
Dept: PRIMARY CARE CLINIC | Age: 63
End: 2023-10-27

## 2023-10-27 DIAGNOSIS — Z95.2 S/P AVR (AORTIC VALVE REPLACEMENT): Primary | ICD-10-CM

## 2023-10-27 LAB
INTERNATIONAL NORMALIZATION RATIO, POC: 4.5
PROTHROMBIN TIME, POC: 53.9

## 2023-10-31 ENCOUNTER — NURSE ONLY (OUTPATIENT)
Dept: PRIMARY CARE CLINIC | Age: 63
End: 2023-10-31
Payer: COMMERCIAL

## 2023-10-31 DIAGNOSIS — Z95.2 S/P AVR (AORTIC VALVE REPLACEMENT): Primary | ICD-10-CM

## 2023-10-31 LAB
INTERNATIONAL NORMALIZATION RATIO, POC: 2.4
PROTHROMBIN TIME, POC: 28.7

## 2023-10-31 PROCEDURE — 93793 ANTICOAG MGMT PT WARFARIN: CPT | Performed by: NURSE PRACTITIONER

## 2023-10-31 PROCEDURE — 85610 PROTHROMBIN TIME: CPT | Performed by: NURSE PRACTITIONER

## 2023-11-06 ENCOUNTER — ANTI-COAG VISIT (OUTPATIENT)
Dept: PRIMARY CARE CLINIC | Age: 63
End: 2023-11-06

## 2023-11-06 ENCOUNTER — NURSE ONLY (OUTPATIENT)
Dept: PRIMARY CARE CLINIC | Age: 63
End: 2023-11-06
Payer: COMMERCIAL

## 2023-11-06 DIAGNOSIS — Z95.2 S/P AVR (AORTIC VALVE REPLACEMENT): Primary | ICD-10-CM

## 2023-11-06 LAB
INTERNATIONAL NORMALIZATION RATIO, POC: 4.4
PROTHROMBIN TIME, POC: 52.6

## 2023-11-06 PROCEDURE — 85610 PROTHROMBIN TIME: CPT | Performed by: NURSE PRACTITIONER

## 2023-11-06 NOTE — PROGRESS NOTES
Patient inr is 4.4  Patient is taking 5 mg daily  She reports no missed doses and no abnormal bruising or bleeding

## 2023-11-07 ENCOUNTER — NURSE ONLY (OUTPATIENT)
Dept: PRIMARY CARE CLINIC | Age: 63
End: 2023-11-07
Payer: COMMERCIAL

## 2023-11-07 DIAGNOSIS — Z95.2 S/P AVR (AORTIC VALVE REPLACEMENT): Primary | ICD-10-CM

## 2023-11-07 LAB
INTERNATIONAL NORMALIZATION RATIO, POC: 3.3
PROTHROMBIN TIME, POC: 39.5

## 2023-11-07 PROCEDURE — 93793 ANTICOAG MGMT PT WARFARIN: CPT | Performed by: NURSE PRACTITIONER

## 2023-11-07 PROCEDURE — 85610 PROTHROMBIN TIME: CPT | Performed by: NURSE PRACTITIONER

## 2023-11-14 ENCOUNTER — NURSE ONLY (OUTPATIENT)
Dept: PRIMARY CARE CLINIC | Age: 63
End: 2023-11-14

## 2023-11-14 DIAGNOSIS — Z95.2 S/P AVR (AORTIC VALVE REPLACEMENT): Primary | ICD-10-CM

## 2023-11-14 LAB
INTERNATIONAL NORMALIZATION RATIO, POC: 2.7
PROTHROMBIN TIME, POC: 32.2

## 2023-11-17 LAB
ALBUMIN SERPL-MCNC: NORMAL G/DL
ALP BLD-CCNC: NORMAL U/L
ALT SERPL-CCNC: NORMAL U/L
ANION GAP SERPL CALCULATED.3IONS-SCNC: NORMAL MMOL/L
AST SERPL-CCNC: NORMAL U/L
BASOPHILS ABSOLUTE: NORMAL
BASOPHILS RELATIVE PERCENT: NORMAL
BILIRUB SERPL-MCNC: NORMAL MG/DL
BUN BLDV-MCNC: NORMAL MG/DL
C-REACTIVE PROTEIN: NORMAL
CALCIUM SERPL-MCNC: NORMAL MG/DL
CHLORIDE BLD-SCNC: NORMAL MMOL/L
CO2: NORMAL
CREAT SERPL-MCNC: NORMAL MG/DL
EGFR: NORMAL
EOSINOPHILS ABSOLUTE: NORMAL
EOSINOPHILS RELATIVE PERCENT: NORMAL
GLUCOSE BLD-MCNC: NORMAL MG/DL
HCT VFR BLD CALC: NORMAL %
HEMOGLOBIN: NORMAL
LYMPHOCYTES ABSOLUTE: NORMAL
LYMPHOCYTES RELATIVE PERCENT: NORMAL
MCH RBC QN AUTO: NORMAL PG
MCHC RBC AUTO-ENTMCNC: NORMAL G/DL
MCV RBC AUTO: NORMAL FL
MONOCYTES ABSOLUTE: NORMAL
MONOCYTES RELATIVE PERCENT: NORMAL
NEUTROPHILS ABSOLUTE: NORMAL
NEUTROPHILS RELATIVE PERCENT: NORMAL
PDW BLD-RTO: NORMAL %
PLATELET # BLD: NORMAL 10*3/UL
PMV BLD AUTO: NORMAL FL
POTASSIUM SERPL-SCNC: NORMAL MMOL/L
RBC # BLD: NORMAL 10*6/UL
SODIUM BLD-SCNC: NORMAL MMOL/L
TOTAL PROTEIN: NORMAL
WBC # BLD: NORMAL 10*3/UL

## 2023-11-20 DIAGNOSIS — G91.2 NPH (NORMAL PRESSURE HYDROCEPHALUS) (HCC): Primary | ICD-10-CM

## 2023-11-30 ENCOUNTER — HOSPITAL ENCOUNTER (OUTPATIENT)
Dept: CT IMAGING | Age: 63
Discharge: HOME | End: 2023-12-02
Payer: COMMERCIAL

## 2023-11-30 DIAGNOSIS — G91.2 NPH (NORMAL PRESSURE HYDROCEPHALUS) (HCC): ICD-10-CM

## 2023-11-30 PROCEDURE — 70450 CT HEAD/BRAIN W/O DYE: CPT

## 2023-12-01 ENCOUNTER — NURSE ONLY (OUTPATIENT)
Dept: PRIMARY CARE CLINIC | Age: 63
End: 2023-12-01
Payer: COMMERCIAL

## 2023-12-01 DIAGNOSIS — Z95.2 S/P AVR (AORTIC VALVE REPLACEMENT): Primary | ICD-10-CM

## 2023-12-01 LAB — INTERNATIONAL NORMALIZATION RATIO, POC: 1.8

## 2023-12-01 PROCEDURE — 85610 PROTHROMBIN TIME: CPT | Performed by: NURSE PRACTITIONER

## 2023-12-07 ENCOUNTER — OFFICE VISIT (OUTPATIENT)
Dept: NEUROSURGERY | Age: 63
End: 2023-12-07
Payer: COMMERCIAL

## 2023-12-07 DIAGNOSIS — Z98.2 S/P VP SHUNT: ICD-10-CM

## 2023-12-07 DIAGNOSIS — G91.2 NPH (NORMAL PRESSURE HYDROCEPHALUS) (HCC): Primary | ICD-10-CM

## 2023-12-07 PROCEDURE — G8427 DOCREV CUR MEDS BY ELIG CLIN: HCPCS | Performed by: STUDENT IN AN ORGANIZED HEALTH CARE EDUCATION/TRAINING PROGRAM

## 2023-12-07 PROCEDURE — G8420 CALC BMI NORM PARAMETERS: HCPCS | Performed by: STUDENT IN AN ORGANIZED HEALTH CARE EDUCATION/TRAINING PROGRAM

## 2023-12-07 PROCEDURE — 1123F ACP DISCUSS/DSCN MKR DOCD: CPT | Performed by: STUDENT IN AN ORGANIZED HEALTH CARE EDUCATION/TRAINING PROGRAM

## 2023-12-07 PROCEDURE — 4004F PT TOBACCO SCREEN RCVD TLK: CPT | Performed by: STUDENT IN AN ORGANIZED HEALTH CARE EDUCATION/TRAINING PROGRAM

## 2023-12-07 PROCEDURE — G8400 PT W/DXA NO RESULTS DOC: HCPCS | Performed by: STUDENT IN AN ORGANIZED HEALTH CARE EDUCATION/TRAINING PROGRAM

## 2023-12-07 PROCEDURE — 99213 OFFICE O/P EST LOW 20 MIN: CPT | Performed by: STUDENT IN AN ORGANIZED HEALTH CARE EDUCATION/TRAINING PROGRAM

## 2023-12-07 PROCEDURE — 1090F PRES/ABSN URINE INCON ASSESS: CPT | Performed by: STUDENT IN AN ORGANIZED HEALTH CARE EDUCATION/TRAINING PROGRAM

## 2023-12-07 PROCEDURE — 3017F COLORECTAL CA SCREEN DOC REV: CPT | Performed by: STUDENT IN AN ORGANIZED HEALTH CARE EDUCATION/TRAINING PROGRAM

## 2023-12-07 PROCEDURE — 99212 OFFICE O/P EST SF 10 MIN: CPT

## 2023-12-07 PROCEDURE — G8484 FLU IMMUNIZE NO ADMIN: HCPCS | Performed by: STUDENT IN AN ORGANIZED HEALTH CARE EDUCATION/TRAINING PROGRAM

## 2023-12-07 NOTE — PROGRESS NOTES
Problem Focused Office Visit     Subjective: Lynwood Hamman is a 70 y.o.  female who has a PMH of aortic valve replacement and previous  shunt placement in [place] in 2019 by Dr. Berenice Rodriguez. Per record review patient has a Strata ll valve. Patient presents today for follow up. She denies any headaches, memory loss, urinary incontinence or gait imbalance. No other complaints. Head CT reviewed. She is unsure the setting her shunt is set at. No numbness or weakness. She is currently on Coumadin. Physical Exam  HENT:      Head: Normocephalic. Eyes:      Extraocular Movements: Extraocular movements intact. Pupils: Pupils are equal, round, and reactive to light. Cardiovascular:      Rate and Rhythm: Normal rate. Pulmonary:      Effort: Pulmonary effort is normal.   Abdominal:      General: There is no distension. Musculoskeletal:         General: Normal range of motion. Cervical back: Normal range of motion. Skin:     General: Skin is warm and dry. Neurological:      Mental Status: She is alert. Comments: A&Ox3  CN3-12 intact  Motor Strength full   Sensation intact to light touch   Reflexes normal   (-)Drift    Psychiatric:         Thought Content: Thought content normal.                  Imagin2023 CT Head   1. No previous studies available for comparison to determine the baseline. 2.  Right ventricular shunt catheter cross the midline into the posterior body of the left lateral ventricle. 3.  Pattern of communicating type of hydrocephalus. 4.  No indication for subependymoma edema to indicated component of hypertension presently. 5.  No indication for acute intracranial event     Assessment: This is a 70 y.o.  female presenting for a follow up after CT scan. Patient with no new symptoms or complaints. Head CT as above.      Plan:  -CT Head reviewed and discussed  in detail, patient without any symptoms, no adjustment needed.  -Discussed symptoms that would prompt a follow

## 2024-01-15 ENCOUNTER — NURSE ONLY (OUTPATIENT)
Dept: PRIMARY CARE CLINIC | Age: 64
End: 2024-01-15
Payer: MEDICAID

## 2024-01-15 ENCOUNTER — TELEPHONE (OUTPATIENT)
Dept: PRIMARY CARE CLINIC | Age: 64
End: 2024-01-15

## 2024-01-15 DIAGNOSIS — Z95.2 S/P AVR (AORTIC VALVE REPLACEMENT): Primary | ICD-10-CM

## 2024-01-15 PROCEDURE — 93793 ANTICOAG MGMT PT WARFARIN: CPT | Performed by: NURSE PRACTITIONER

## 2024-01-15 NOTE — TELEPHONE ENCOUNTER
Patient came in office for INR today. INR was 4.1 and patient states she takes 5 mg every day. Per calendar, INR was 5.4 one month ago and it shows she should have been taking 5mg, 5 mg, 2.5 mg. Patient again states she takes 5 mg every day. Rain was at lunch at this time so patient left. Rain's instructions are:  Patient is to HOLD today. She is to be taking 2.5 mg on Tues and Thurs and 5 mg on the remaining days and recheck in one week. I attempted to call patient to tell her and there was no answer. I saw a note that the daughter, Zulema, makes appts for her, so I called daughter and explained these directions and advised that we need to recheck in one week. Daughter said she will speak to patient about following these instructions.

## 2024-01-15 NOTE — PROGRESS NOTES
Patient's daughter was notified of the plan and she said she will try to get her to adhere to those changes.

## 2024-01-22 ENCOUNTER — NURSE ONLY (OUTPATIENT)
Dept: PRIMARY CARE CLINIC | Age: 64
End: 2024-01-22
Payer: MEDICAID

## 2024-01-22 ENCOUNTER — ANTI-COAG VISIT (OUTPATIENT)
Dept: PRIMARY CARE CLINIC | Age: 64
End: 2024-01-22

## 2024-01-22 DIAGNOSIS — Z79.01 ANTICOAGULANT LONG-TERM USE: ICD-10-CM

## 2024-01-22 DIAGNOSIS — Z95.2 S/P AVR (AORTIC VALVE REPLACEMENT): Primary | ICD-10-CM

## 2024-01-22 LAB
INTERNATIONAL NORMALIZATION RATIO, POC: 3.5
PROTHROMBIN TIME, POC: 41.7

## 2024-01-22 PROCEDURE — 93793 ANTICOAG MGMT PT WARFARIN: CPT | Performed by: FAMILY MEDICINE

## 2024-01-22 PROCEDURE — 85610 PROTHROMBIN TIME: CPT | Performed by: NURSE PRACTITIONER

## 2024-02-01 ENCOUNTER — TELEPHONE (OUTPATIENT)
Dept: PRIMARY CARE CLINIC | Age: 64
End: 2024-02-01

## 2024-02-01 NOTE — TELEPHONE ENCOUNTER
The office of Lisa Granado called wanting to speak directly with you. I stated you are not in the office today, and they asked if you can call her the next time you are in the office.   No information was offered. I asked if I could at least ask which patient the call was regarding.

## 2024-02-05 ENCOUNTER — TELEPHONE (OUTPATIENT)
Dept: PRIMARY CARE CLINIC | Age: 64
End: 2024-02-05

## 2024-02-05 NOTE — TELEPHONE ENCOUNTER
TIAN called to ask if you would sign home care orders for patient. I advised that , yes, she is a current patient and that you would sign for orders.

## 2024-02-05 NOTE — TELEPHONE ENCOUNTER
Last Appointment:  12/22/2023  Future Appointments   Date Time Provider Department Center   3/22/2024  1:30 PM Rain Leal APRN - NP [facility]   7/18/2024  1:00 PM Kevin Moreno MD [facility]      Patient returned call for appointment tomorrow at 9:45 with Rain.  Patient has been admitted to [facility].  Not sure when she will be D/C but maybe tomorrow.    Electronically signed by Garima Soni LPN on 2/5/2024 at 1:55 PM

## 2024-02-06 ENCOUNTER — TELEPHONE (OUTPATIENT)
Dept: PRIMARY CARE CLINIC | Age: 64
End: 2024-02-06

## 2024-02-06 NOTE — TELEPHONE ENCOUNTER
Last Appointment:  12/22/2023  Future Appointments   Date Time Provider Department Center   3/22/2024  1:30 PM Rain Leal APRN - NP [facility]   7/18/2024  1:00 PM Kevin Moreno MD [facility] discharge called patient is going home sometime today.  She will need a hospital follow up appointment with in next week.  Not able to find an open appointment.    Patient will need to be updated when scheduled.    Electronically signed by Garima Soni LPN on 2/6/2024 at 10:59 AM

## 2024-02-07 ENCOUNTER — TELEPHONE (OUTPATIENT)
Dept: PRIMARY CARE CLINIC | Age: 64
End: 2024-02-07

## 2024-02-07 NOTE — TELEPHONE ENCOUNTER
[facility] She said they will be going in to patients home today and since patient was admitted to [facility] with a high INR, and she wanted to know if we would like her to check INR while she is in home. I told her that she can and I would send you a message. She wants to know if you want it checked once a week while they are seeing her. She said to have us let her know and she will write up the order and fax it over to us.

## 2024-02-07 NOTE — TELEPHONE ENCOUNTER
Lyudmila from [facility] called to say that she went to the house for the initial visit and patient denied all services. She did not want physical therapy, home health care or an order for her INR she prefers to come into the office weekly

## 2024-02-09 ENCOUNTER — NURSE ONLY (OUTPATIENT)
Dept: PRIMARY CARE CLINIC | Age: 64
End: 2024-02-09
Payer: MEDICAID

## 2024-02-09 DIAGNOSIS — Z79.01 ANTICOAGULANT LONG-TERM USE: Primary | ICD-10-CM

## 2024-02-09 LAB
INTERNATIONAL NORMALIZATION RATIO, POC: 2
PROTHROMBIN TIME, POC: 23.4

## 2024-02-09 PROCEDURE — 93793 ANTICOAG MGMT PT WARFARIN: CPT | Performed by: NURSE PRACTITIONER

## 2024-02-09 PROCEDURE — 85610 PROTHROMBIN TIME: CPT | Performed by: NURSE PRACTITIONER

## 2024-02-09 NOTE — PROGRESS NOTES
Patient states she didn't get a new dose of coumadin when d/c so she went on 5mg qd when she got home. Has an appt to follow up on Monday.

## 2024-02-12 ENCOUNTER — OFFICE VISIT (OUTPATIENT)
Dept: PRIMARY CARE CLINIC | Age: 64
End: 2024-02-12
Payer: MEDICAID

## 2024-02-12 VITALS
WEIGHT: 150 LBS | SYSTOLIC BLOOD PRESSURE: 132 MMHG | BODY MASS INDEX: 24.11 KG/M2 | RESPIRATION RATE: 16 BRPM | TEMPERATURE: 98.7 F | HEIGHT: 66 IN | HEART RATE: 86 BPM | DIASTOLIC BLOOD PRESSURE: 84 MMHG | OXYGEN SATURATION: 96 %

## 2024-02-12 DIAGNOSIS — Z95.2 S/P AVR (AORTIC VALVE REPLACEMENT): ICD-10-CM

## 2024-02-12 DIAGNOSIS — G93.41 METABOLIC ENCEPHALOPATHY: Primary | ICD-10-CM

## 2024-02-12 DIAGNOSIS — Z09 HOSPITAL DISCHARGE FOLLOW-UP: ICD-10-CM

## 2024-02-12 DIAGNOSIS — K74.00 LIVER FIBROSIS: ICD-10-CM

## 2024-02-12 LAB
ALBUMIN SERPL-MCNC: NORMAL G/DL
ALP BLD-CCNC: NORMAL U/L
ALT SERPL-CCNC: NORMAL U/L
ANION GAP SERPL CALCULATED.3IONS-SCNC: NORMAL MMOL/L
AST SERPL-CCNC: NORMAL U/L
BASOPHILS ABSOLUTE: NORMAL
BASOPHILS RELATIVE PERCENT: NORMAL
BILIRUB SERPL-MCNC: NORMAL MG/DL
BUN BLDV-MCNC: NORMAL MG/DL
CALCIUM SERPL-MCNC: NORMAL MG/DL
CHLORIDE BLD-SCNC: NORMAL MMOL/L
CHOLESTEROL, TOTAL: NORMAL
CHOLESTEROL/HDL RATIO: NORMAL
CO2: NORMAL
CREAT SERPL-MCNC: NORMAL MG/DL
EGFR: NORMAL
EOSINOPHILS ABSOLUTE: NORMAL
EOSINOPHILS RELATIVE PERCENT: NORMAL
ESTIMATED AVERAGE GLUCOSE: NORMAL
GLUCOSE BLD-MCNC: NORMAL MG/DL
HBA1C MFR BLD: NORMAL %
HCT VFR BLD CALC: NORMAL %
HDLC SERPL-MCNC: NORMAL MG/DL
HEMOGLOBIN: NORMAL
INR, EXTERNAL: 0
INTERNATIONAL NORMALIZATION RATIO, POC: 2.7
LDL CHOLESTEROL CALCULATED: NORMAL
LYMPHOCYTES ABSOLUTE: NORMAL
LYMPHOCYTES RELATIVE PERCENT: NORMAL
MCH RBC QN AUTO: NORMAL PG
MCHC RBC AUTO-ENTMCNC: NORMAL G/DL
MCV RBC AUTO: NORMAL FL
MONOCYTES ABSOLUTE: NORMAL
MONOCYTES RELATIVE PERCENT: NORMAL
NEUTROPHILS ABSOLUTE: NORMAL
NEUTROPHILS RELATIVE PERCENT: NORMAL
NONHDLC SERPL-MCNC: NORMAL MG/DL
PLATELET # BLD: NORMAL 10*3/UL
PMV BLD AUTO: NORMAL FL
POTASSIUM SERPL-SCNC: NORMAL MMOL/L
PROTHROMBIN TIME, POC: 32.9
PT, EXTERNAL: 0
RBC # BLD: NORMAL 10*6/UL
SODIUM BLD-SCNC: NORMAL MMOL/L
TOTAL PROTEIN: NORMAL
TRIGL SERPL-MCNC: NORMAL MG/DL
TSH SERPL DL<=0.05 MIU/L-ACNC: NORMAL M[IU]/L
VITAMIN B-12: NORMAL
VLDLC SERPL CALC-MCNC: NORMAL MG/DL
WBC # BLD: NORMAL 10*3/UL

## 2024-02-12 PROCEDURE — 1111F DSCHRG MED/CURRENT MED MERGE: CPT | Performed by: NURSE PRACTITIONER

## 2024-02-12 PROCEDURE — 99214 OFFICE O/P EST MOD 30 MIN: CPT | Performed by: NURSE PRACTITIONER

## 2024-02-12 PROCEDURE — 1123F ACP DISCUSS/DSCN MKR DOCD: CPT | Performed by: NURSE PRACTITIONER

## 2024-02-12 PROCEDURE — 85610 PROTHROMBIN TIME: CPT | Performed by: NURSE PRACTITIONER

## 2024-02-12 RX ORDER — LACTULOSE 10 G/15ML
SOLUTION ORAL
COMMUNITY
Start: 2024-02-08

## 2024-02-12 NOTE — PROGRESS NOTES
Post-Discharge Transitional Care Management Progress Note      Olga Z Holter   YOB: 1960    Date of Office Visit:  2/12/2024  Date of Hospital Admission: 2/1/2024  Date of Hospital Discharge: 2/6/2024    Care management risk score Rising risk (score 2-5) and Complex Care (Scores >=6): No Risk Score On File     Non face to face  following discharge, date last encounter closed (first attempt may have been earlier): *No documented post hospital discharge outreach found in the last 14 days *No documented post hospital discharge outreach found in the last 14 days    Call initiated 2 business days of discharge: *No response recorded in the last 14 days    ASSESSMENT/PLAN:   Metabolic encephalopathy  -     Comprehensive Metabolic Panel; Future  -     Ammonia; Future  -     Radha Ibarra CNP, Gastroenterology, [place]  S/P AVR (aortic valve replacement)  -     POCT INR  Liver fibrosis  -     Radha Ibarra CNP, Gastroenterology, [place]  Hospital discharge follow-up  -     WI DISCHARGE MEDS RECONCILED W/ CURRENT OUTPATIENT MED LIST  We will repeat labs today.  Continue Coumadin 5 mg daily.  Referral placed to gastroenterology, side effects and administration instructions were discussed.  Encourage patient to increase activity and follow a Mediterranean diet.    Medical Decision Making: high complexity  Return in 1 month (on 3/12/2024).         Subjective:   HPI:  Follow up of Hospital problems/diagnosis(es): Patient presented to [facility] on 2/1/2024 with a history of hydrocephaly with  shunt, prosthetic aortic valve replacement on chronic anticoagulation with a goal INR of 2.5-3.5 and hyperlipidemia.  She had been sent to the ER after going to a neurology appointment with increased confusion and ataxia.  Patient had imaging done that showed the patient was intact.  CT of the head and neck was negative for any acute findings.  EEG was negative.  Echocardiogram showed normal left

## 2024-02-13 DIAGNOSIS — G93.41 METABOLIC ENCEPHALOPATHY: ICD-10-CM

## 2024-02-14 ENCOUNTER — TELEPHONE (OUTPATIENT)
Dept: PRIMARY CARE CLINIC | Age: 64
End: 2024-02-14

## 2024-02-14 NOTE — TELEPHONE ENCOUNTER
Daughter is calling in to get the results of the urine cultures that were ordered while patient was in the hospital. She said that the [facility] called her today and told her that they were sent to our office and to call the primary care provider to get results and medications if needed .

## 2024-02-16 NOTE — TELEPHONE ENCOUNTER
Left detailed message for patient on voicemail with test results, I also advised that if she is having symptoms of a uti she would need to use our express care

## 2024-02-16 NOTE — TELEPHONE ENCOUNTER
Last Appointment:  2/12/2024  Future Appointments   Date Time Provider Department Center   3/14/2024 12:45 PM Rain Leal APRN - NP [facility]   3/19/2024  1:45 PM Radha Almaraz APRN - CNP [facility]   7/18/2024  1:00 PM Kevin Moreno MD [facility]        
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 On exam, R thigh: puncture site healed with no evidence of active drainage, no surrounding erythema. Feet are pink, toes are warm to touch with adequate capillary refill. Patient staying active, doing well. No vascular surgery intervention required for now. She will continue to follow up with us here PRN.

## 2024-02-18 RX ORDER — METRONIDAZOLE 10 MG/G
GEL TOPICAL
Qty: 60 G | Refills: 0 | Status: SHIPPED | OUTPATIENT
Start: 2024-02-18

## 2024-03-08 ENCOUNTER — NURSE ONLY (OUTPATIENT)
Dept: PRIMARY CARE CLINIC | Age: 64
End: 2024-03-08

## 2024-03-08 DIAGNOSIS — Z95.2 S/P AVR (AORTIC VALVE REPLACEMENT): Primary | ICD-10-CM

## 2024-03-08 LAB
INTERNATIONAL NORMALIZATION RATIO, POC: 5.1
INTERNATIONAL NORMALIZATION RATIO, POC: 5.1
PROTHROMBIN TIME, POC: 60.9

## 2024-03-08 NOTE — PROGRESS NOTES
Last Appointment:  Visit date not found  Future Appointments   Date Time Provider Department Center   3/14/2024 12:45 PM Rain Leal APRN - NP [place] [facility]   3/19/2024  1:45 PM Radha Almaraz APRN - CNP [facility]   7/18/2024  1:00 PM Kevin Moreno MD [facility]      Patient in office for INR check.  It was 5.1.  Spoke with RANDI Santiago CNP who advised holding Coumadin and repeat INR on Monday.  Patient informed of new instructions and able to verbalize understanding.     Electronically signed by Garima Soni LPN on 3/8/2024 at 2:28 PM

## 2024-03-11 ENCOUNTER — ANTI-COAG VISIT (OUTPATIENT)
Dept: PRIMARY CARE CLINIC | Age: 64
End: 2024-03-11

## 2024-03-11 ENCOUNTER — NURSE ONLY (OUTPATIENT)
Dept: PRIMARY CARE CLINIC | Age: 64
End: 2024-03-11
Payer: MEDICAID

## 2024-03-11 DIAGNOSIS — Z95.2 S/P AVR (AORTIC VALVE REPLACEMENT): Primary | ICD-10-CM

## 2024-03-11 LAB
INTERNATIONAL NORMALIZATION RATIO, POC: 1.3
PROTHROMBIN TIME, POC: 15.8

## 2024-03-11 PROCEDURE — 85610 PROTHROMBIN TIME: CPT | Performed by: NURSE PRACTITIONER

## 2024-03-11 NOTE — PROGRESS NOTES
Per patient she was to hold all medication starting Friday March 08, 2024. She was supposed to come have her INR checked again. Today she reports no abnormal bruising and has had no bleeding

## 2024-03-19 ENCOUNTER — TELEPHONE (OUTPATIENT)
Dept: GASTROENTEROLOGY | Age: 64
End: 2024-03-19

## 2024-03-19 NOTE — TELEPHONE ENCOUNTER
Left message for patient to call office and reschedule missed appointment with Radha for consult in [place]

## 2024-03-22 ENCOUNTER — NURSE ONLY (OUTPATIENT)
Dept: PRIMARY CARE CLINIC | Age: 64
End: 2024-03-22
Payer: MEDICAID

## 2024-03-22 DIAGNOSIS — Z95.2 S/P AVR (AORTIC VALVE REPLACEMENT): Primary | ICD-10-CM

## 2024-03-22 LAB
INTERNATIONAL NORMALIZATION RATIO, POC: 2.5
PROTHROMBIN TIME, POC: 21

## 2024-03-22 PROCEDURE — 93793 ANTICOAG MGMT PT WARFARIN: CPT | Performed by: NURSE PRACTITIONER

## 2024-03-22 PROCEDURE — 85610 PROTHROMBIN TIME: CPT | Performed by: NURSE PRACTITIONER

## 2024-04-09 ENCOUNTER — NURSE ONLY (OUTPATIENT)
Dept: PRIMARY CARE CLINIC | Age: 64
End: 2024-04-09
Payer: MEDICAID

## 2024-04-09 DIAGNOSIS — Z95.2 S/P AVR (AORTIC VALVE REPLACEMENT): Primary | ICD-10-CM

## 2024-04-09 LAB
INTERNATIONAL NORMALIZATION RATIO, POC: 3.8
PROTHROMBIN TIME, POC: 45.9

## 2024-04-09 PROCEDURE — 85610 PROTHROMBIN TIME: CPT | Performed by: NURSE PRACTITIONER

## 2024-04-09 PROCEDURE — 93793 ANTICOAG MGMT PT WARFARIN: CPT | Performed by: NURSE PRACTITIONER

## 2024-04-10 NOTE — PROGRESS NOTES
Daughter is notified and showed understanding. Medications will be adjusted and patient will come in on Wednesday for an INR

## 2024-04-23 ENCOUNTER — NURSE ONLY (OUTPATIENT)
Dept: PRIMARY CARE CLINIC | Age: 64
End: 2024-04-23
Payer: MEDICAID

## 2024-04-23 DIAGNOSIS — Z95.2 S/P AVR (AORTIC VALVE REPLACEMENT): Primary | ICD-10-CM

## 2024-04-23 LAB
INTERNATIONAL NORMALIZATION RATIO, POC: 3.9
PROTHROMBIN TIME, POC: 46.9

## 2024-04-23 PROCEDURE — 85610 PROTHROMBIN TIME: CPT | Performed by: NURSE PRACTITIONER

## 2024-04-23 PROCEDURE — 93793 ANTICOAG MGMT PT WARFARIN: CPT | Performed by: NURSE PRACTITIONER

## 2024-04-25 ENCOUNTER — INITIAL CONSULT (OUTPATIENT)
Dept: GASTROENTEROLOGY | Age: 64
End: 2024-04-25
Payer: MEDICAID

## 2024-04-25 VITALS
DIASTOLIC BLOOD PRESSURE: 70 MMHG | HEIGHT: 66 IN | SYSTOLIC BLOOD PRESSURE: 112 MMHG | HEART RATE: 74 BPM | OXYGEN SATURATION: 99 % | WEIGHT: 153 LBS | BODY MASS INDEX: 24.59 KG/M2 | RESPIRATION RATE: 16 BRPM | TEMPERATURE: 97.3 F

## 2024-04-25 DIAGNOSIS — K74.00 LIVER FIBROSIS: Primary | ICD-10-CM

## 2024-04-25 DIAGNOSIS — K74.00 LIVER FIBROSIS: ICD-10-CM

## 2024-04-25 LAB
ALBUMIN: 4.4 G/DL (ref 3.5–5.2)
ALP BLD-CCNC: 79 U/L (ref 35–104)
ALT SERPL-CCNC: 10 U/L (ref 0–32)
AST SERPL-CCNC: 12 U/L (ref 0–31)
BASOPHILS ABSOLUTE: 0.04 K/UL (ref 0–0.2)
BASOPHILS RELATIVE PERCENT: 1 % (ref 0–2)
BILIRUB SERPL-MCNC: 0.5 MG/DL (ref 0–1.2)
BILIRUBIN DIRECT: <0.2 MG/DL (ref 0–0.3)
BILIRUBIN, INDIRECT: NORMAL MG/DL (ref 0–1)
EOSINOPHILS ABSOLUTE: 0.04 K/UL (ref 0.05–0.5)
EOSINOPHILS RELATIVE PERCENT: 1 % (ref 0–6)
GGT, 20027: 17 U/L (ref 6–42)
HCT VFR BLD CALC: 38.8 % (ref 34–48)
HEMOGLOBIN: 12.7 G/DL (ref 11.5–15.5)
IMMATURE GRANULOCYTES %: 0 % (ref 0–5)
IMMATURE GRANULOCYTES ABSOLUTE: <0.03 K/UL (ref 0–0.58)
INR BLD: 1.5
IRON % SATURATION: 29 % (ref 15–50)
IRON: 105 UG/DL (ref 37–145)
LYMPHOCYTES ABSOLUTE: 2.26 K/UL (ref 1.5–4)
LYMPHOCYTES RELATIVE PERCENT: 32 % (ref 20–42)
MCH RBC QN AUTO: 30.7 PG (ref 26–35)
MCHC RBC AUTO-ENTMCNC: 32.7 G/DL (ref 32–34.5)
MCV RBC AUTO: 93.7 FL (ref 80–99.9)
MONOCYTES ABSOLUTE: 0.34 K/UL (ref 0.1–0.95)
MONOCYTES RELATIVE PERCENT: 5 % (ref 2–12)
NEUTROPHILS ABSOLUTE: 4.39 K/UL (ref 1.8–7.3)
NEUTROPHILS RELATIVE PERCENT: 62 % (ref 43–80)
PDW BLD-RTO: 12.9 % (ref 11.5–15)
PLATELET # BLD: 235 K/UL (ref 130–450)
PMV BLD AUTO: 10.7 FL (ref 7–12)
PROTHROMBIN TIME: 16.3 SEC (ref 9.3–12.4)
RBC # BLD: 4.14 M/UL (ref 3.5–5.5)
TOTAL IRON BINDING CAPACITY: 362 UG/DL (ref 250–450)
TOTAL PROTEIN: 7.2 G/DL (ref 6.4–8.3)
WBC # BLD: 7.1 K/UL (ref 4.5–11.5)

## 2024-04-25 PROCEDURE — 1123F ACP DISCUSS/DSCN MKR DOCD: CPT | Performed by: NURSE PRACTITIONER

## 2024-04-25 PROCEDURE — 99202 OFFICE O/P NEW SF 15 MIN: CPT | Performed by: NURSE PRACTITIONER

## 2024-04-26 LAB — ANTI-MITOCHONDRIAL AB, IFA: NEGATIVE

## 2024-04-27 LAB
CERULOPLASMIN: 24 MG/DL (ref 16–45)
HAV AB SERPL IA-ACNC: REACTIVE
HEPATITIS B CORE TOTAL ANTIBODY: NONREACTIVE

## 2024-04-28 LAB
HBV SURFACE AB TITR SER: <3.1 MIU/ML (ref 0–9.99)
HEPATITIS B SURF AG,XHBAGS: ABNORMAL
HEPATITIS C ANTIBODY: NONREACTIVE

## 2024-04-29 DIAGNOSIS — K74.00 LIVER FIBROSIS: Primary | ICD-10-CM

## 2024-05-06 ENCOUNTER — NURSE ONLY (OUTPATIENT)
Dept: PRIMARY CARE CLINIC | Age: 64
End: 2024-05-06
Payer: MEDICAID

## 2024-05-06 DIAGNOSIS — Z79.01 ANTICOAGULANT LONG-TERM USE: ICD-10-CM

## 2024-05-06 DIAGNOSIS — Z95.2 S/P AVR (AORTIC VALVE REPLACEMENT): Primary | ICD-10-CM

## 2024-05-06 LAB
INTERNATIONAL NORMALIZATION RATIO, POC: 1.9
PROTHROMBIN TIME, POC: 22.5

## 2024-05-06 PROCEDURE — 93793 ANTICOAG MGMT PT WARFARIN: CPT | Performed by: NURSE PRACTITIONER

## 2024-05-06 PROCEDURE — 85610 PROTHROMBIN TIME: CPT | Performed by: NURSE PRACTITIONER

## 2024-05-23 ENCOUNTER — OFFICE VISIT (OUTPATIENT)
Dept: GASTROENTEROLOGY | Age: 64
End: 2024-05-23
Payer: MEDICAID

## 2024-05-23 VITALS
OXYGEN SATURATION: 99 % | RESPIRATION RATE: 16 BRPM | HEART RATE: 66 BPM | SYSTOLIC BLOOD PRESSURE: 120 MMHG | BODY MASS INDEX: 24.75 KG/M2 | TEMPERATURE: 97 F | HEIGHT: 66 IN | WEIGHT: 154 LBS | DIASTOLIC BLOOD PRESSURE: 70 MMHG

## 2024-05-23 DIAGNOSIS — K74.00 LIVER FIBROSIS: ICD-10-CM

## 2024-05-23 DIAGNOSIS — R10.30 LOWER ABDOMINAL PAIN: Primary | ICD-10-CM

## 2024-05-23 DIAGNOSIS — R10.30 LOWER ABDOMINAL PAIN: ICD-10-CM

## 2024-05-23 PROCEDURE — 1123F ACP DISCUSS/DSCN MKR DOCD: CPT | Performed by: NURSE PRACTITIONER

## 2024-05-23 PROCEDURE — 99212 OFFICE O/P EST SF 10 MIN: CPT | Performed by: NURSE PRACTITIONER

## 2024-05-23 RX ORDER — CYANOCOBALAMIN (VITAMIN B-12) 500 MCG
TABLET ORAL
COMMUNITY

## 2024-05-23 NOTE — PROGRESS NOTES
Olga Z Holter (:  1960) is a 63 y.o. female, here for evaluation of the following chief complaint(s):  Follow-up      SUBJECTIVE/OBJECTIVE:  HPI:    Rita is a very pleasant 63 year old female that presents today for follow up on possible liver fibrosis and lower abdominal pain    Patient continues to complain of bilateral lower abdominal pain  She tells me that pain is constant and worsens after meals  Describes the pain as a \"twist\"  Moves bowel daily and no BRBPR or melena  No weight loss, fevers, nausea, or vomiting  Continues to adamantly refuse a colonoscopy  No family history of CRC    Lab work after last office visit shows Hep Antigen at low levels.   Repeat blood draw recommended  Ultrasound while inpatient showed a normal liver, multiple gallstones, and stage 1 fibrosis   Patient is from [place]         ROS:  General: Patient denies n/v/f/c or weight loss.  HEENT: Patient denies persistent postnasal drip, scleral icterus, drooling, persistent bleeding from nose/mouth.  Resp: Patient denies SOB, wheezing, productive cough.  Cards: Patient denies CP, palpitations, significant edema  GI: As above.  Derm: Patient denies jaundice/rashes.   Musc: Patient denies diffuse/irregular joint swelling or myalgias.      Objective   Wt Readings from Last 3 Encounters:   24 69.9 kg (154 lb)   24 69.4 kg (153 lb)   24 68 kg (150 lb)     Temp Readings from Last 3 Encounters:   24 97 °F (36.1 °C) (Temporal)   24 97.3 °F (36.3 °C) (Infrared)   24 98.7 °F (37.1 °C)     BP Readings from Last 3 Encounters:   24 120/70   24 112/70   24 132/84     Pulse Readings from Last 3 Encounters:   24 66   24 74   24 86        Physical Exam  Constitutional:       Appearance: Normal appearance.   Abdominal:      General: Bowel sounds are normal.      Palpations: Abdomen is soft.      Comments: Tenderness bilateral lower abdomen   Neurological:      Mental Status:

## 2024-05-24 ENCOUNTER — NURSE ONLY (OUTPATIENT)
Dept: PRIMARY CARE CLINIC | Age: 64
End: 2024-05-24
Payer: MEDICAID

## 2024-05-24 ENCOUNTER — TELEPHONE (OUTPATIENT)
Dept: PRIMARY CARE CLINIC | Age: 64
End: 2024-05-24

## 2024-05-24 DIAGNOSIS — Z95.2 S/P AVR (AORTIC VALVE REPLACEMENT): Primary | ICD-10-CM

## 2024-05-24 LAB
INTERNATIONAL NORMALIZATION RATIO, POC: 4
PROTHROMBIN TIME, POC: 12

## 2024-05-24 PROCEDURE — 85610 PROTHROMBIN TIME: CPT | Performed by: NURSE PRACTITIONER

## 2024-05-24 NOTE — TELEPHONE ENCOUNTER
Left message for patient to hold today's Coumadin then continue with 5 mg daily with repeat check on 05/28/2024.

## 2024-05-24 NOTE — TELEPHONE ENCOUNTER
Last Appointment:  2/12/2024  Future Appointments   Date Time Provider Department Center   5/28/2024  1:30 PM Rain Leal APRN - NP [facility]   6/21/2024  3:00 PM SE[facility] CT2 BD [facility] CT [facility] Radiolog   7/9/2024  2:30 PM Radha Almaraz APRN - CNP [facility]   7/18/2024  1:00 PM Kevin Moreno MD [facility]      Patient in for INR.  4.0 today.  Taking 5 mg daily.  Denies missing any doses.    Electronically signed by Garima Soni LPN on 5/24/2024 at 12:06 PM

## 2024-05-28 ENCOUNTER — OFFICE VISIT (OUTPATIENT)
Dept: PRIMARY CARE CLINIC | Age: 64
End: 2024-05-28
Payer: MEDICAID

## 2024-05-28 VITALS
SYSTOLIC BLOOD PRESSURE: 118 MMHG | OXYGEN SATURATION: 98 % | RESPIRATION RATE: 16 BRPM | WEIGHT: 155 LBS | DIASTOLIC BLOOD PRESSURE: 68 MMHG | HEIGHT: 66 IN | TEMPERATURE: 98.3 F | HEART RATE: 75 BPM | BODY MASS INDEX: 24.91 KG/M2

## 2024-05-28 DIAGNOSIS — Z95.2 S/P AVR (AORTIC VALVE REPLACEMENT): Primary | ICD-10-CM

## 2024-05-28 DIAGNOSIS — E78.5 HYPERLIPIDEMIA, UNSPECIFIED HYPERLIPIDEMIA TYPE: ICD-10-CM

## 2024-05-28 DIAGNOSIS — K74.00 LIVER FIBROSIS: ICD-10-CM

## 2024-05-28 DIAGNOSIS — Z98.2 S/P VP SHUNT: ICD-10-CM

## 2024-05-28 DIAGNOSIS — F32.A DEPRESSION, UNSPECIFIED DEPRESSION TYPE: ICD-10-CM

## 2024-05-28 DIAGNOSIS — J30.9 ALLERGIC RHINITIS, UNSPECIFIED SEASONALITY, UNSPECIFIED TRIGGER: ICD-10-CM

## 2024-05-28 DIAGNOSIS — Z72.0 TOBACCO ABUSE: ICD-10-CM

## 2024-05-28 DIAGNOSIS — Z79.01 ANTICOAGULANT LONG-TERM USE: ICD-10-CM

## 2024-05-28 DIAGNOSIS — G91.2 NPH (NORMAL PRESSURE HYDROCEPHALUS) (HCC): ICD-10-CM

## 2024-05-28 LAB
ANION GAP SERPL CALCULATED.3IONS-SCNC: 13 MMOL/L (ref 7–16)
BUN BLDV-MCNC: 4 MG/DL (ref 6–23)
CALCIUM SERPL-MCNC: 9.4 MG/DL (ref 8.6–10.2)
CHLORIDE BLD-SCNC: 103 MMOL/L (ref 98–107)
CO2: 22 MMOL/L (ref 22–29)
CREAT SERPL-MCNC: 0.6 MG/DL (ref 0.5–1)
GFR, ESTIMATED: >90 ML/MIN/1.73M2
GLUCOSE BLD-MCNC: 92 MG/DL (ref 74–99)
INTERNATIONAL NORMALIZATION RATIO, POC: 3.2
POTASSIUM SERPL-SCNC: 4 MMOL/L (ref 3.5–5)
PROTHROMBIN TIME, POC: 38.9
SODIUM BLD-SCNC: 138 MMOL/L (ref 132–146)

## 2024-05-28 PROCEDURE — 1123F ACP DISCUSS/DSCN MKR DOCD: CPT | Performed by: NURSE PRACTITIONER

## 2024-05-28 PROCEDURE — 85610 PROTHROMBIN TIME: CPT | Performed by: NURSE PRACTITIONER

## 2024-05-28 PROCEDURE — 99214 OFFICE O/P EST MOD 30 MIN: CPT | Performed by: NURSE PRACTITIONER

## 2024-05-28 PROCEDURE — G2211 COMPLEX E/M VISIT ADD ON: HCPCS | Performed by: NURSE PRACTITIONER

## 2024-05-28 RX ORDER — WARFARIN SODIUM 5 MG/1
5 TABLET ORAL DAILY
Qty: 90 TABLET | Refills: 1 | Status: SHIPPED | OUTPATIENT
Start: 2024-05-28

## 2024-05-28 RX ORDER — PAROXETINE HYDROCHLORIDE 20 MG/1
TABLET, FILM COATED ORAL
Qty: 90 TABLET | Refills: 1 | Status: SHIPPED | OUTPATIENT
Start: 2024-05-28

## 2024-05-28 RX ORDER — ATORVASTATIN CALCIUM 40 MG/1
40 TABLET, FILM COATED ORAL DAILY
Qty: 90 TABLET | Refills: 1 | Status: SHIPPED | OUTPATIENT
Start: 2024-05-28

## 2024-05-28 RX ORDER — CETIRIZINE HYDROCHLORIDE 10 MG/1
10 TABLET ORAL DAILY
Qty: 90 TABLET | Refills: 1 | Status: SHIPPED | OUTPATIENT
Start: 2024-05-28

## 2024-05-28 RX ORDER — ALBUTEROL SULFATE 90 UG/1
2 AEROSOL, METERED RESPIRATORY (INHALATION) 4 TIMES DAILY PRN
Qty: 18 G | Refills: 0 | Status: SHIPPED | OUTPATIENT
Start: 2024-05-28

## 2024-05-28 RX ORDER — ALBUTEROL SULFATE 90 UG/1
2 AEROSOL, METERED RESPIRATORY (INHALATION) EVERY 4 HOURS PRN
Qty: 18 G | Refills: 3 | Status: SHIPPED | OUTPATIENT
Start: 2024-05-28

## 2024-05-28 ASSESSMENT — ENCOUNTER SYMPTOMS
DIARRHEA: 0
RHINORRHEA: 0
VOMITING: 0
VOICE CHANGE: 0
EYES NEGATIVE: 1
WHEEZING: 0
FACIAL SWELLING: 0
CHEST TIGHTNESS: 0
ABDOMINAL PAIN: 0
COLOR CHANGE: 0
APNEA: 0
COUGH: 0
BACK PAIN: 0
CONSTIPATION: 0
ABDOMINAL DISTENTION: 0
NAUSEA: 0
SHORTNESS OF BREATH: 0

## 2024-05-28 NOTE — PROGRESS NOTES
ESTABLISHED PRIMARY CARE VISIT  23  Name: Olga Z Holter   : 1960   Age: 63 y.o.  Sex: female    Subjective:     Chief Complaint   Patient presents with    Abdominal Pain     Olga Z Holter presents to the office to follow-up on their chronic problems.  She is very pleasant yet noncompliant. Issues that were addressed today include:  NPH s/p  shunt in 2017- follows with neurosurgery.  Recent CTH was negative for subependymona edema or acute event.    Migraines-controlled.  Used to follow with neurology, Dr. Campbell.  She has not followed up recently.  Depression-controlled with Paxil, d/c'ed Wellbutrin.  She does not participate in counseling.  She does not follow with psychiatry.  S/P AVR- does not follow with cardiology. Currently taking 5 mg coumadin everyday-I have referred her to [facility] cardiology and [facility] cardiology.  She did not establish.  She is noncompliant with her Coumadin and it is difficult to keep her INR in therapeutic range.  I have also tried to refer her to Coumadin clinic and the patient declines.  She denies any abnormal bleeding or bruising.  She denies any chest pain, shortness of breath or dizziness.  INR today 3.2.  Review of Systems   Constitutional:  Negative for activity change, appetite change, chills, fatigue, fever and unexpected weight change.   HENT:  Negative for congestion, facial swelling, mouth sores, postnasal drip, rhinorrhea, sneezing, tinnitus and voice change.    Eyes: Negative.    Respiratory:  Negative for apnea, cough, chest tightness, shortness of breath and wheezing.    Cardiovascular:  Negative for chest pain, palpitations and leg swelling.   Gastrointestinal:  Negative for abdominal distention, abdominal pain, constipation, diarrhea, nausea and vomiting.   Endocrine: Negative for cold intolerance and heat intolerance.   Genitourinary:  Negative for difficulty urinating, dysuria, flank pain, frequency, pelvic pain and urgency.

## 2024-05-28 NOTE — ASSESSMENT & PLAN NOTE
On coumadin- NOT compliant with this. I have referred to cardiology for better control. GOAL 2.5-3.5.  Current 3.2.  Denies bleeding/bruising.  Recheck in 10 days

## 2024-05-28 NOTE — ASSESSMENT & PLAN NOTE
Stable on Paxil.  Patient did discontinue Wellbutrin per patient choice.  She reports that she has been reading as a past time but has not participated in any activities to keep her busy outside of the home.

## 2024-05-29 LAB — HEPATITIS B SURF AG,XHBAGS: NONREACTIVE

## 2024-06-01 LAB
HBV IU/ML: NOT DETECTED IU/ML
HBV LOG 10 IU/ML: NOT DETECTED LOG IU/ML
INTERPRETATION: NOT DETECTED

## 2024-06-07 ENCOUNTER — NURSE ONLY (OUTPATIENT)
Dept: PRIMARY CARE CLINIC | Age: 64
End: 2024-06-07
Payer: MEDICAID

## 2024-06-07 ENCOUNTER — TELEPHONE (OUTPATIENT)
Dept: PRIMARY CARE CLINIC | Age: 64
End: 2024-06-07

## 2024-06-07 DIAGNOSIS — Z95.2 S/P AVR (AORTIC VALVE REPLACEMENT): Primary | ICD-10-CM

## 2024-06-07 LAB
INTERNATIONAL NORMALIZATION RATIO, POC: 5.8
PROTHROMBIN TIME, POC: 8

## 2024-06-07 PROCEDURE — 85610 PROTHROMBIN TIME: CPT | Performed by: NURSE PRACTITIONER

## 2024-06-07 PROCEDURE — 93793 ANTICOAG MGMT PT WARFARIN: CPT | Performed by: NURSE PRACTITIONER

## 2024-06-07 NOTE — TELEPHONE ENCOUNTER
Last Appointment:  5/28/2024  Future Appointments   Date Time Provider Department Center   6/21/2024  3:00 PM SEB CT2 BD [facility] CT [facility] Radiolog   7/9/2024  2:30 PM Radha Almaraz APRN - CNP [facility]   7/18/2024  1:00 PM Kevin Moreno MD [facility]   8/30/2024 11:00 AM Rain [facility]al [facility]N - [facility]      Patient in for INR it is 5.8.  Denies any unusual bruising/bleeding or missed dosages.      Current dosage is 5 mg daily.      Spoke with Mayda Diggs NP who advised hold Coumadin then come back in on 06/10/2024 (Monday) to have it repeated.      Had to leave a message because patient who just left did not answer phone.    Electronically signed by Garima Soni LPN on 6/7/2024 at 2:40 PM

## 2024-06-10 ENCOUNTER — TELEPHONE (OUTPATIENT)
Dept: PRIMARY CARE CLINIC | Age: 64
End: 2024-06-10

## 2024-06-10 ENCOUNTER — NURSE ONLY (OUTPATIENT)
Dept: PRIMARY CARE CLINIC | Age: 64
End: 2024-06-10
Payer: MEDICAID

## 2024-06-10 DIAGNOSIS — Z95.2 S/P AVR (AORTIC VALVE REPLACEMENT): Primary | ICD-10-CM

## 2024-06-10 LAB
INTERNATIONAL NORMALIZATION RATIO, POC: 1.4
PROTHROMBIN TIME, POC: 0

## 2024-06-10 PROCEDURE — 85610 PROTHROMBIN TIME: CPT | Performed by: NURSE PRACTITIONER

## 2024-06-10 PROCEDURE — 93793 ANTICOAG MGMT PT WARFARIN: CPT | Performed by: NURSE PRACTITIONER

## 2024-06-13 ENCOUNTER — NURSE ONLY (OUTPATIENT)
Dept: PRIMARY CARE CLINIC | Age: 64
End: 2024-06-13
Payer: MEDICAID

## 2024-06-13 DIAGNOSIS — Z95.2 S/P AVR (AORTIC VALVE REPLACEMENT): Primary | ICD-10-CM

## 2024-06-13 LAB — INTERNATIONAL NORMALIZATION RATIO, POC: 1.8

## 2024-06-13 PROCEDURE — 93793 ANTICOAG MGMT PT WARFARIN: CPT | Performed by: NURSE PRACTITIONER

## 2024-06-17 ENCOUNTER — NURSE ONLY (OUTPATIENT)
Dept: PRIMARY CARE CLINIC | Age: 64
End: 2024-06-17
Payer: MEDICAID

## 2024-06-17 DIAGNOSIS — Z95.2 S/P AVR (AORTIC VALVE REPLACEMENT): Primary | ICD-10-CM

## 2024-06-17 LAB
INTERNATIONAL NORMALIZATION RATIO, POC: 3.6
PROTHROMBIN TIME, POC: 42.8

## 2024-06-17 PROCEDURE — 93793 ANTICOAG MGMT PT WARFARIN: CPT | Performed by: NURSE PRACTITIONER

## 2024-06-17 PROCEDURE — 85610 PROTHROMBIN TIME: CPT | Performed by: NURSE PRACTITIONER

## 2024-06-17 NOTE — PROGRESS NOTES
INR is just above goal but appears to be very labile per previous results. PCP currently out of office. Recommend continue warfarin 5 mg daily. Recheck Friday.

## 2024-06-21 ENCOUNTER — HOSPITAL ENCOUNTER (OUTPATIENT)
Dept: CT IMAGING | Age: 64
End: 2024-06-21
Payer: MEDICAID

## 2024-06-21 DIAGNOSIS — R10.30 LOWER ABDOMINAL PAIN: ICD-10-CM

## 2024-06-21 DIAGNOSIS — K74.00 LIVER FIBROSIS: ICD-10-CM

## 2024-06-21 PROCEDURE — 6360000004 HC RX CONTRAST MEDICATION: Performed by: RADIOLOGY

## 2024-06-21 PROCEDURE — 74177 CT ABD & PELVIS W/CONTRAST: CPT

## 2024-06-21 RX ADMIN — IOPAMIDOL 75 ML: 755 INJECTION, SOLUTION INTRAVENOUS at 14:10

## 2024-06-28 ENCOUNTER — HOSPITAL ENCOUNTER (OUTPATIENT)
Dept: MAMMOGRAPHY | Age: 64
End: 2024-06-28
Payer: MEDICAID

## 2024-06-28 DIAGNOSIS — Z78.0 MENOPAUSE: ICD-10-CM

## 2024-06-28 PROCEDURE — 77080 DXA BONE DENSITY AXIAL: CPT

## 2024-07-09 RX ORDER — PSEUDOEPHEDRINE HCL 30 MG
500 TABLET ORAL 2 TIMES DAILY WITH MEALS
Qty: 360 TABLET | Refills: 1 | Status: SHIPPED | OUTPATIENT
Start: 2024-07-09

## 2024-07-09 RX ORDER — PSEUDOEPHEDRINE HCL 30 MG
500 TABLET ORAL 2 TIMES DAILY WITH MEALS
Qty: 360 TABLET | Refills: 1 | COMMUNITY
Start: 2024-07-09 | End: 2024-07-09

## 2024-07-18 ENCOUNTER — OFFICE VISIT (OUTPATIENT)
Dept: CARDIOLOGY CLINIC | Age: 64
End: 2024-07-18

## 2024-07-18 VITALS
RESPIRATION RATE: 18 BRPM | WEIGHT: 150.4 LBS | HEIGHT: 66 IN | SYSTOLIC BLOOD PRESSURE: 135 MMHG | HEART RATE: 79 BPM | DIASTOLIC BLOOD PRESSURE: 64 MMHG | BODY MASS INDEX: 24.17 KG/M2

## 2024-07-18 DIAGNOSIS — Z95.2 S/P AVR (AORTIC VALVE REPLACEMENT): Primary | ICD-10-CM

## 2024-07-18 NOTE — PATIENT INSTRUCTIONS
Call [facility] and ask for dental clinic.  Let them know you would like to be seen for dental cleaning and that you have an surgical aortic valve

## 2024-07-29 ENCOUNTER — NURSE ONLY (OUTPATIENT)
Dept: PRIMARY CARE CLINIC | Age: 64
End: 2024-07-29
Payer: MEDICAID

## 2024-07-29 ENCOUNTER — ANTI-COAG VISIT (OUTPATIENT)
Dept: PRIMARY CARE CLINIC | Age: 64
End: 2024-07-29

## 2024-07-29 DIAGNOSIS — Z95.2 S/P AVR (AORTIC VALVE REPLACEMENT): Primary | ICD-10-CM

## 2024-07-29 LAB
INTERNATIONAL NORMALIZATION RATIO, POC: 3.2
PROTHROMBIN TIME, POC: 38.7

## 2024-07-29 PROCEDURE — 85610 PROTHROMBIN TIME: CPT | Performed by: NURSE PRACTITIONER

## 2024-07-29 NOTE — PROGRESS NOTES
Patient notified and showed is taking 5 mg daily and currently reports no abnormal bruising or bleeding

## 2024-08-01 ENCOUNTER — TELEPHONE (OUTPATIENT)
Dept: GASTROENTEROLOGY | Age: 64
End: 2024-08-01

## 2024-08-01 NOTE — TELEPHONE ENCOUNTER
Pt had left a message stating she was not able to make her appt for 7-30-24.  I called to r/s and spoke to daughter Gricelda. They are in the midst of moving right now and is asking if Radha can call her with results of CT and labs.   [phone]. Electronically signed by HAKEEM BLACKWOOD LPN on 8/1/2024 at 9:00 AM      Called Gricelda and informed her of results of CT scan.  Patient is moving to [place] on Saturday.  Gricelda will stop in the office tomorrow for a copy of the CT scan.  They will contact a PCP in [place] for further treatment

## 2024-08-29 ENCOUNTER — TELEPHONE (OUTPATIENT)
Dept: PRIMARY CARE CLINIC | Age: 64
End: 2024-08-29

## 2024-08-29 NOTE — TELEPHONE ENCOUNTER
Pt daughter called stated that her and her mother moved out of state and was leaving practice.  Apt cancelled and she stated they will send release for charts once they find another doctor.